# Patient Record
Sex: FEMALE | Race: WHITE | NOT HISPANIC OR LATINO
[De-identification: names, ages, dates, MRNs, and addresses within clinical notes are randomized per-mention and may not be internally consistent; named-entity substitution may affect disease eponyms.]

---

## 2017-01-12 ENCOUNTER — OTHER (OUTPATIENT)
Age: 82
End: 2017-01-12

## 2017-01-13 ENCOUNTER — APPOINTMENT (OUTPATIENT)
Dept: GERIATRICS | Facility: CLINIC | Age: 82
End: 2017-01-13

## 2017-01-13 VITALS
WEIGHT: 137 LBS | SYSTOLIC BLOOD PRESSURE: 138 MMHG | RESPIRATION RATE: 16 BRPM | DIASTOLIC BLOOD PRESSURE: 60 MMHG | TEMPERATURE: 97.6 F | BODY MASS INDEX: 25.21 KG/M2 | HEIGHT: 62 IN | HEART RATE: 58 BPM | OXYGEN SATURATION: 97 %

## 2017-02-13 ENCOUNTER — RX RENEWAL (OUTPATIENT)
Age: 82
End: 2017-02-13

## 2017-03-21 ENCOUNTER — APPOINTMENT (OUTPATIENT)
Dept: CARDIOLOGY | Facility: CLINIC | Age: 82
End: 2017-03-21

## 2017-03-21 ENCOUNTER — NON-APPOINTMENT (OUTPATIENT)
Age: 82
End: 2017-03-21

## 2017-03-21 VITALS — DIASTOLIC BLOOD PRESSURE: 86 MMHG | SYSTOLIC BLOOD PRESSURE: 152 MMHG

## 2017-03-21 VITALS
HEIGHT: 62 IN | WEIGHT: 140 LBS | BODY MASS INDEX: 25.76 KG/M2 | SYSTOLIC BLOOD PRESSURE: 176 MMHG | HEART RATE: 70 BPM | DIASTOLIC BLOOD PRESSURE: 92 MMHG

## 2017-04-21 ENCOUNTER — APPOINTMENT (OUTPATIENT)
Dept: GERIATRICS | Facility: CLINIC | Age: 82
End: 2017-04-21

## 2017-04-21 VITALS
WEIGHT: 140.04 LBS | SYSTOLIC BLOOD PRESSURE: 130 MMHG | BODY MASS INDEX: 25.77 KG/M2 | RESPIRATION RATE: 16 BRPM | DIASTOLIC BLOOD PRESSURE: 60 MMHG | HEART RATE: 63 BPM | HEIGHT: 62 IN | TEMPERATURE: 97.6 F | OXYGEN SATURATION: 97 %

## 2017-04-21 DIAGNOSIS — H57.8 OTHER SPECIFIED DISORDERS OF EYE AND ADNEXA: ICD-10-CM

## 2017-05-04 ENCOUNTER — RX RENEWAL (OUTPATIENT)
Age: 82
End: 2017-05-04

## 2017-06-05 ENCOUNTER — RX RENEWAL (OUTPATIENT)
Age: 82
End: 2017-06-05

## 2017-07-27 ENCOUNTER — RX RENEWAL (OUTPATIENT)
Age: 82
End: 2017-07-27

## 2017-08-21 ENCOUNTER — CLINICAL ADVICE (OUTPATIENT)
Age: 82
End: 2017-08-21

## 2017-08-24 ENCOUNTER — APPOINTMENT (OUTPATIENT)
Dept: GERIATRICS | Facility: CLINIC | Age: 82
End: 2017-08-24

## 2017-09-05 ENCOUNTER — APPOINTMENT (OUTPATIENT)
Dept: OPHTHALMOLOGY | Facility: CLINIC | Age: 82
End: 2017-09-05
Payer: MEDICARE

## 2017-09-05 DIAGNOSIS — H16.8 OTHER KERATITIS: ICD-10-CM

## 2017-09-05 DIAGNOSIS — H04.123 DRY EYE SYNDROME OF BILATERAL LACRIMAL GLANDS: ICD-10-CM

## 2017-09-05 PROCEDURE — 92004 COMPRE OPH EXAM NEW PT 1/>: CPT

## 2017-09-19 ENCOUNTER — APPOINTMENT (OUTPATIENT)
Dept: CARDIOLOGY | Facility: CLINIC | Age: 82
End: 2017-09-19

## 2017-10-04 ENCOUNTER — APPOINTMENT (OUTPATIENT)
Dept: CARDIOLOGY | Facility: CLINIC | Age: 82
End: 2017-10-04
Payer: MEDICARE

## 2017-10-04 ENCOUNTER — NON-APPOINTMENT (OUTPATIENT)
Age: 82
End: 2017-10-04

## 2017-10-04 VITALS
HEIGHT: 62 IN | HEART RATE: 59 BPM | OXYGEN SATURATION: 97 % | SYSTOLIC BLOOD PRESSURE: 183 MMHG | DIASTOLIC BLOOD PRESSURE: 106 MMHG

## 2017-10-04 VITALS — DIASTOLIC BLOOD PRESSURE: 90 MMHG | SYSTOLIC BLOOD PRESSURE: 160 MMHG

## 2017-10-04 PROCEDURE — 93000 ELECTROCARDIOGRAM COMPLETE: CPT

## 2017-10-04 PROCEDURE — 99214 OFFICE O/P EST MOD 30 MIN: CPT

## 2017-10-17 ENCOUNTER — APPOINTMENT (OUTPATIENT)
Dept: GERIATRICS | Facility: CLINIC | Age: 82
End: 2017-10-17
Payer: MEDICARE

## 2017-10-17 VITALS
BODY MASS INDEX: 25.03 KG/M2 | DIASTOLIC BLOOD PRESSURE: 92 MMHG | WEIGHT: 136 LBS | HEIGHT: 62 IN | RESPIRATION RATE: 16 BRPM | SYSTOLIC BLOOD PRESSURE: 170 MMHG | OXYGEN SATURATION: 96 % | HEART RATE: 59 BPM | TEMPERATURE: 97.8 F

## 2017-10-17 VITALS — DIASTOLIC BLOOD PRESSURE: 80 MMHG | SYSTOLIC BLOOD PRESSURE: 160 MMHG

## 2017-10-17 PROCEDURE — 99214 OFFICE O/P EST MOD 30 MIN: CPT

## 2017-10-18 LAB
ALBUMIN SERPL ELPH-MCNC: 4.2 G/DL
ALP BLD-CCNC: 58 U/L
ALT SERPL-CCNC: 18 U/L
ANION GAP SERPL CALC-SCNC: 9 MMOL/L
AST SERPL-CCNC: 28 U/L
BASOPHILS # BLD AUTO: 0.01 K/UL
BASOPHILS NFR BLD AUTO: 0.1 %
BILIRUB SERPL-MCNC: 0.4 MG/DL
BUN SERPL-MCNC: 25 MG/DL
CALCIUM SERPL-MCNC: 9.6 MG/DL
CHLORIDE SERPL-SCNC: 101 MMOL/L
CO2 SERPL-SCNC: 29 MMOL/L
CREAT SERPL-MCNC: 1.27 MG/DL
EOSINOPHIL # BLD AUTO: 0.04 K/UL
EOSINOPHIL NFR BLD AUTO: 0.5 %
GLUCOSE SERPL-MCNC: 86 MG/DL
HCT VFR BLD CALC: 38.4 %
HGB BLD-MCNC: 12.7 G/DL
IMM GRANULOCYTES NFR BLD AUTO: 0.2 %
LYMPHOCYTES # BLD AUTO: 1.36 K/UL
LYMPHOCYTES NFR BLD AUTO: 16.4 %
MAN DIFF?: NORMAL
MCHC RBC-ENTMCNC: 31.1 PG
MCHC RBC-ENTMCNC: 33.1 GM/DL
MCV RBC AUTO: 94.1 FL
MONOCYTES # BLD AUTO: 1 K/UL
MONOCYTES NFR BLD AUTO: 12.1 %
NEUTROPHILS # BLD AUTO: 5.84 K/UL
NEUTROPHILS NFR BLD AUTO: 70.7 %
PLATELET # BLD AUTO: 217 K/UL
POTASSIUM SERPL-SCNC: 5.1 MMOL/L
PROT SERPL-MCNC: 7.1 G/DL
RBC # BLD: 4.08 M/UL
RBC # FLD: 13.8 %
SODIUM SERPL-SCNC: 139 MMOL/L
TSH SERPL-ACNC: 1.59 UIU/ML
WBC # FLD AUTO: 8.27 K/UL

## 2017-11-06 ENCOUNTER — RX RENEWAL (OUTPATIENT)
Age: 82
End: 2017-11-06

## 2017-11-17 ENCOUNTER — RX RENEWAL (OUTPATIENT)
Age: 82
End: 2017-11-17

## 2018-03-19 ENCOUNTER — LABORATORY RESULT (OUTPATIENT)
Age: 83
End: 2018-03-19

## 2018-03-20 ENCOUNTER — APPOINTMENT (OUTPATIENT)
Dept: DERMATOLOGY | Facility: CLINIC | Age: 83
End: 2018-03-20
Payer: MEDICARE

## 2018-03-20 VITALS
SYSTOLIC BLOOD PRESSURE: 138 MMHG | HEIGHT: 62 IN | WEIGHT: 135 LBS | BODY MASS INDEX: 24.84 KG/M2 | DIASTOLIC BLOOD PRESSURE: 80 MMHG

## 2018-03-20 DIAGNOSIS — D48.7 NEOPLASM OF UNCERTAIN BEHAVIOR OF OTHER SPECIFIED SITES: ICD-10-CM

## 2018-03-20 PROCEDURE — 99213 OFFICE O/P EST LOW 20 MIN: CPT | Mod: 25

## 2018-03-20 PROCEDURE — 11302 SHAVE SKIN LESION 1.1-2.0 CM: CPT

## 2018-03-20 PROCEDURE — 17262 DSTRJ MAL LES T/A/L 1.1-2.0: CPT

## 2018-03-20 PROCEDURE — 17263 DSTRJ MAL LES T/A/L 2.1-3.0: CPT

## 2018-03-23 PROBLEM — D48.7 NEOPLASM OF UNCERTAIN BEHAVIOR OF HAND: Status: ACTIVE | Noted: 2018-03-23

## 2018-03-29 ENCOUNTER — OTHER (OUTPATIENT)
Age: 83
End: 2018-03-29

## 2018-05-09 ENCOUNTER — RX RENEWAL (OUTPATIENT)
Age: 83
End: 2018-05-09

## 2018-05-22 ENCOUNTER — APPOINTMENT (OUTPATIENT)
Dept: ORTHOPEDIC SURGERY | Facility: CLINIC | Age: 83
End: 2018-05-22
Payer: MEDICARE

## 2018-05-22 ENCOUNTER — APPOINTMENT (OUTPATIENT)
Dept: GERIATRICS | Facility: CLINIC | Age: 83
End: 2018-05-22

## 2018-05-22 VITALS
HEART RATE: 53 BPM | HEIGHT: 62 IN | DIASTOLIC BLOOD PRESSURE: 85 MMHG | WEIGHT: 135 LBS | BODY MASS INDEX: 24.84 KG/M2 | SYSTOLIC BLOOD PRESSURE: 176 MMHG

## 2018-05-22 DIAGNOSIS — M54.5 LOW BACK PAIN: ICD-10-CM

## 2018-05-22 PROCEDURE — 99215 OFFICE O/P EST HI 40 MIN: CPT

## 2018-06-07 ENCOUNTER — APPOINTMENT (OUTPATIENT)
Dept: GERIATRICS | Facility: CLINIC | Age: 83
End: 2018-06-07
Payer: MEDICARE

## 2018-06-07 VITALS
WEIGHT: 134.38 LBS | TEMPERATURE: 97.6 F | SYSTOLIC BLOOD PRESSURE: 140 MMHG | OXYGEN SATURATION: 97 % | RESPIRATION RATE: 16 BRPM | DIASTOLIC BLOOD PRESSURE: 80 MMHG | BODY MASS INDEX: 24.73 KG/M2 | HEART RATE: 57 BPM | HEIGHT: 62 IN

## 2018-06-07 PROCEDURE — 99215 OFFICE O/P EST HI 40 MIN: CPT

## 2018-06-07 RX ORDER — ZOSTER VACCINE LIVE 19400 [PFU]/.65ML
19400 INJECTION, POWDER, LYOPHILIZED, FOR SUSPENSION SUBCUTANEOUS
Qty: 1 | Refills: 0 | Status: DISCONTINUED | OUTPATIENT
Start: 2017-01-13 | End: 2018-06-07

## 2018-06-08 LAB
25(OH)D3 SERPL-MCNC: 28.7 NG/ML
ALBUMIN SERPL ELPH-MCNC: 4.2 G/DL
ALP BLD-CCNC: 112 U/L
ALT SERPL-CCNC: 17 U/L
ANION GAP SERPL CALC-SCNC: 16 MMOL/L
AST SERPL-CCNC: 29 U/L
BASOPHILS # BLD AUTO: 0.01 K/UL
BASOPHILS NFR BLD AUTO: 0.2 %
BILIRUB SERPL-MCNC: 0.4 MG/DL
BUN SERPL-MCNC: 19 MG/DL
CALCIUM SERPL-MCNC: 9.7 MG/DL
CHLORIDE SERPL-SCNC: 104 MMOL/L
CHOLEST SERPL-MCNC: 172 MG/DL
CHOLEST/HDLC SERPL: 2.3 RATIO
CO2 SERPL-SCNC: 25 MMOL/L
CREAT SERPL-MCNC: 1.14 MG/DL
EOSINOPHIL # BLD AUTO: 0.05 K/UL
EOSINOPHIL NFR BLD AUTO: 0.8 %
FOLATE SERPL-MCNC: >20 NG/ML
GLUCOSE SERPL-MCNC: 77 MG/DL
HCT VFR BLD CALC: 40.1 %
HDLC SERPL-MCNC: 76 MG/DL
HGB BLD-MCNC: 12.8 G/DL
IMM GRANULOCYTES NFR BLD AUTO: 0.5 %
LDLC SERPL CALC-MCNC: 80 MG/DL
LYMPHOCYTES # BLD AUTO: 1.8 K/UL
LYMPHOCYTES NFR BLD AUTO: 27.4 %
MAN DIFF?: NORMAL
MCHC RBC-ENTMCNC: 30.8 PG
MCHC RBC-ENTMCNC: 31.9 GM/DL
MCV RBC AUTO: 96.4 FL
MONOCYTES # BLD AUTO: 0.65 K/UL
MONOCYTES NFR BLD AUTO: 9.9 %
NEUTROPHILS # BLD AUTO: 4.04 K/UL
NEUTROPHILS NFR BLD AUTO: 61.2 %
PLATELET # BLD AUTO: 291 K/UL
POTASSIUM SERPL-SCNC: 4.5 MMOL/L
PROT SERPL-MCNC: 7.8 G/DL
RBC # BLD: 4.16 M/UL
RBC # FLD: 14.9 %
SODIUM SERPL-SCNC: 145 MMOL/L
TRIGL SERPL-MCNC: 78 MG/DL
TSH SERPL-ACNC: 3.36 UIU/ML
VIT B12 SERPL-MCNC: 862 PG/ML
WBC # FLD AUTO: 6.58 K/UL

## 2018-07-19 ENCOUNTER — RX RENEWAL (OUTPATIENT)
Age: 83
End: 2018-07-19

## 2018-08-21 ENCOUNTER — APPOINTMENT (OUTPATIENT)
Dept: GERIATRICS | Facility: CLINIC | Age: 83
End: 2018-08-21

## 2018-08-21 ENCOUNTER — APPOINTMENT (OUTPATIENT)
Dept: GERIATRICS | Facility: CLINIC | Age: 83
End: 2018-08-21
Payer: MEDICARE

## 2018-08-21 VITALS
HEART RATE: 58 BPM | WEIGHT: 135.2 LBS | BODY MASS INDEX: 24.73 KG/M2 | DIASTOLIC BLOOD PRESSURE: 72 MMHG | OXYGEN SATURATION: 97 % | SYSTOLIC BLOOD PRESSURE: 132 MMHG | TEMPERATURE: 97.7 F

## 2018-08-21 PROCEDURE — 99214 OFFICE O/P EST MOD 30 MIN: CPT

## 2018-08-21 PROCEDURE — 99497 ADVNCD CARE PLAN 30 MIN: CPT

## 2018-08-21 RX ORDER — MULTIVIT-MIN/FOLIC/VIT K/LYCOP 400-300MCG
25 MCG TABLET ORAL
Qty: 30 | Refills: 3 | Status: ACTIVE | COMMUNITY
Start: 2018-08-21

## 2018-09-27 ENCOUNTER — RX RENEWAL (OUTPATIENT)
Age: 83
End: 2018-09-27

## 2018-10-19 ENCOUNTER — LABORATORY RESULT (OUTPATIENT)
Age: 83
End: 2018-10-19

## 2018-10-19 ENCOUNTER — APPOINTMENT (OUTPATIENT)
Dept: DERMATOLOGY | Facility: CLINIC | Age: 83
End: 2018-10-19
Payer: MEDICARE

## 2018-10-19 PROCEDURE — 11100 BX SKIN SUBCUTANEOUS&/MUCOUS MEMBRANE 1 LESION: CPT | Mod: 59

## 2018-10-19 PROCEDURE — 17004 DESTROY PREMAL LESIONS 15/>: CPT

## 2018-11-28 ENCOUNTER — RX RENEWAL (OUTPATIENT)
Age: 83
End: 2018-11-28

## 2018-11-29 ENCOUNTER — RX RENEWAL (OUTPATIENT)
Age: 83
End: 2018-11-29

## 2018-12-11 ENCOUNTER — APPOINTMENT (OUTPATIENT)
Dept: GERIATRICS | Facility: CLINIC | Age: 83
End: 2018-12-11
Payer: MEDICARE

## 2018-12-11 VITALS
SYSTOLIC BLOOD PRESSURE: 140 MMHG | HEART RATE: 56 BPM | WEIGHT: 138 LBS | RESPIRATION RATE: 16 BRPM | DIASTOLIC BLOOD PRESSURE: 80 MMHG | BODY MASS INDEX: 25.4 KG/M2 | OXYGEN SATURATION: 97 % | HEIGHT: 62 IN | TEMPERATURE: 97.8 F

## 2018-12-11 DIAGNOSIS — Z85.850 PERSONAL HISTORY OF MALIGNANT NEOPLASM OF THYROID: ICD-10-CM

## 2018-12-11 DIAGNOSIS — Z23 ENCOUNTER FOR IMMUNIZATION: ICD-10-CM

## 2018-12-11 DIAGNOSIS — Z60.2 PROBLEMS RELATED TO LIVING ALONE: ICD-10-CM

## 2018-12-11 DIAGNOSIS — M54.9 DORSALGIA, UNSPECIFIED: ICD-10-CM

## 2018-12-11 DIAGNOSIS — G47.00 INSOMNIA, UNSPECIFIED: ICD-10-CM

## 2018-12-11 DIAGNOSIS — Z74.1 NEED FOR ASSISTANCE WITH PERSONAL CARE: ICD-10-CM

## 2018-12-11 PROCEDURE — G0439: CPT

## 2018-12-11 PROCEDURE — 99214 OFFICE O/P EST MOD 30 MIN: CPT | Mod: 25

## 2018-12-11 SDOH — SOCIAL STABILITY - SOCIAL INSECURITY: PROBLEMS RELATED TO LIVING ALONE: Z60.2

## 2018-12-12 LAB
ALBUMIN SERPL ELPH-MCNC: 4.2 G/DL
ALP BLD-CCNC: 61 U/L
ALT SERPL-CCNC: 15 U/L
ANION GAP SERPL CALC-SCNC: 11 MMOL/L
AST SERPL-CCNC: 27 U/L
BILIRUB SERPL-MCNC: 0.4 MG/DL
BUN SERPL-MCNC: 19 MG/DL
CALCIUM SERPL-MCNC: 9.7 MG/DL
CHLORIDE SERPL-SCNC: 104 MMOL/L
CO2 SERPL-SCNC: 29 MMOL/L
CREAT SERPL-MCNC: 1.11 MG/DL
GLUCOSE SERPL-MCNC: 69 MG/DL
POTASSIUM SERPL-SCNC: 4.3 MMOL/L
PROT SERPL-MCNC: 7.6 G/DL
SODIUM SERPL-SCNC: 144 MMOL/L
TSH SERPL-ACNC: 0.38 UIU/ML

## 2018-12-14 ENCOUNTER — RX RENEWAL (OUTPATIENT)
Age: 83
End: 2018-12-14

## 2018-12-21 ENCOUNTER — APPOINTMENT (OUTPATIENT)
Dept: OPHTHALMOLOGY | Facility: CLINIC | Age: 83
End: 2018-12-21

## 2018-12-27 ENCOUNTER — APPOINTMENT (OUTPATIENT)
Dept: DERMATOLOGY | Facility: CLINIC | Age: 83
End: 2018-12-27
Payer: MEDICARE

## 2018-12-27 VITALS — SYSTOLIC BLOOD PRESSURE: 130 MMHG | DIASTOLIC BLOOD PRESSURE: 80 MMHG

## 2018-12-27 DIAGNOSIS — C44.91 BASAL CELL CARCINOMA OF SKIN, UNSPECIFIED: ICD-10-CM

## 2018-12-27 PROCEDURE — 17003 DESTRUCT PREMALG LES 2-14: CPT

## 2018-12-27 PROCEDURE — 17000 DESTRUCT PREMALG LESION: CPT

## 2018-12-27 PROCEDURE — 99212 OFFICE O/P EST SF 10 MIN: CPT | Mod: 25

## 2018-12-28 ENCOUNTER — APPOINTMENT (OUTPATIENT)
Dept: OPHTHALMOLOGY | Facility: CLINIC | Age: 83
End: 2018-12-28
Payer: MEDICARE

## 2018-12-28 PROCEDURE — 92014 COMPRE OPH EXAM EST PT 1/>: CPT

## 2018-12-28 PROCEDURE — 92134 CPTRZ OPH DX IMG PST SGM RTA: CPT

## 2018-12-31 ENCOUNTER — APPOINTMENT (OUTPATIENT)
Dept: GERIATRICS | Facility: CLINIC | Age: 83
End: 2018-12-31
Payer: MEDICARE

## 2018-12-31 VITALS
HEIGHT: 62 IN | SYSTOLIC BLOOD PRESSURE: 140 MMHG | DIASTOLIC BLOOD PRESSURE: 80 MMHG | WEIGHT: 134 LBS | OXYGEN SATURATION: 98 % | RESPIRATION RATE: 15 BRPM | HEART RATE: 56 BPM | TEMPERATURE: 97.7 F | BODY MASS INDEX: 24.66 KG/M2

## 2018-12-31 DIAGNOSIS — R42 DIZZINESS AND GIDDINESS: ICD-10-CM

## 2018-12-31 DIAGNOSIS — H53.9 UNSPECIFIED VISUAL DISTURBANCE: ICD-10-CM

## 2018-12-31 PROCEDURE — 99214 OFFICE O/P EST MOD 30 MIN: CPT

## 2019-01-02 LAB
ANION GAP SERPL CALC-SCNC: 11 MMOL/L
BASOPHILS # BLD AUTO: 0.01 K/UL
BASOPHILS NFR BLD AUTO: 0.2 %
BUN SERPL-MCNC: 16 MG/DL
CALCIUM SERPL-MCNC: 9.4 MG/DL
CHLORIDE SERPL-SCNC: 102 MMOL/L
CO2 SERPL-SCNC: 28 MMOL/L
CREAT SERPL-MCNC: 1.04 MG/DL
EOSINOPHIL # BLD AUTO: 0.03 K/UL
EOSINOPHIL NFR BLD AUTO: 0.6 %
GLUCOSE SERPL-MCNC: 73 MG/DL
HCT VFR BLD CALC: 37.9 %
HGB BLD-MCNC: 12 G/DL
IMM GRANULOCYTES NFR BLD AUTO: 0.2 %
LYMPHOCYTES # BLD AUTO: 1.52 K/UL
LYMPHOCYTES NFR BLD AUTO: 30 %
MAN DIFF?: NORMAL
MCHC RBC-ENTMCNC: 29.6 PG
MCHC RBC-ENTMCNC: 31.7 GM/DL
MCV RBC AUTO: 93.3 FL
MONOCYTES # BLD AUTO: 0.68 K/UL
MONOCYTES NFR BLD AUTO: 13.4 %
NEUTROPHILS # BLD AUTO: 2.82 K/UL
NEUTROPHILS NFR BLD AUTO: 55.6 %
PLATELET # BLD AUTO: 218 K/UL
POTASSIUM SERPL-SCNC: 3.7 MMOL/L
RBC # BLD: 4.06 M/UL
RBC # FLD: 13.5 %
SODIUM SERPL-SCNC: 141 MMOL/L
WBC # FLD AUTO: 5.07 K/UL

## 2019-01-08 ENCOUNTER — APPOINTMENT (OUTPATIENT)
Dept: OPHTHALMOLOGY | Facility: CLINIC | Age: 84
End: 2019-01-08
Payer: MEDICARE

## 2019-01-08 DIAGNOSIS — H18.459 NODULAR CORNEAL DEGENERATION, UNSPECIFIED EYE: ICD-10-CM

## 2019-01-08 PROCEDURE — 92136 OPHTHALMIC BIOMETRY: CPT

## 2019-01-08 PROCEDURE — 92025 CPTRIZED CORNEAL TOPOGRAPHY: CPT

## 2019-01-08 PROCEDURE — 92014 COMPRE OPH EXAM EST PT 1/>: CPT

## 2019-02-01 ENCOUNTER — MEDICATION RENEWAL (OUTPATIENT)
Age: 84
End: 2019-02-01

## 2019-02-04 ENCOUNTER — RX RENEWAL (OUTPATIENT)
Age: 84
End: 2019-02-04

## 2019-02-11 ENCOUNTER — MEDICATION RENEWAL (OUTPATIENT)
Age: 84
End: 2019-02-11

## 2019-02-12 ENCOUNTER — MEDICATION RENEWAL (OUTPATIENT)
Age: 84
End: 2019-02-12

## 2019-02-12 ENCOUNTER — APPOINTMENT (OUTPATIENT)
Dept: GERIATRICS | Facility: CLINIC | Age: 84
End: 2019-02-12
Payer: MEDICARE

## 2019-02-12 ENCOUNTER — NON-APPOINTMENT (OUTPATIENT)
Age: 84
End: 2019-02-12

## 2019-02-12 VITALS
SYSTOLIC BLOOD PRESSURE: 120 MMHG | HEART RATE: 62 BPM | DIASTOLIC BLOOD PRESSURE: 70 MMHG | RESPIRATION RATE: 15 BRPM | HEIGHT: 62 IN | OXYGEN SATURATION: 96 % | BODY MASS INDEX: 24.66 KG/M2 | WEIGHT: 134 LBS | TEMPERATURE: 97.7 F

## 2019-02-12 DIAGNOSIS — H26.9 UNSPECIFIED CATARACT: ICD-10-CM

## 2019-02-12 PROCEDURE — 93000 ELECTROCARDIOGRAM COMPLETE: CPT

## 2019-02-12 PROCEDURE — 99214 OFFICE O/P EST MOD 30 MIN: CPT | Mod: GC,25

## 2019-02-12 RX ORDER — MELOXICAM 7.5 MG/1
7.5 TABLET ORAL DAILY
Qty: 7 | Refills: 0 | Status: DISCONTINUED | COMMUNITY
Start: 2018-06-07 | End: 2019-02-12

## 2019-02-13 LAB
ANION GAP SERPL CALC-SCNC: 11 MMOL/L
BASOPHILS # BLD AUTO: 0.01 K/UL
BASOPHILS NFR BLD AUTO: 0.2 %
BUN SERPL-MCNC: 20 MG/DL
CALCIUM SERPL-MCNC: 9.7 MG/DL
CHLORIDE SERPL-SCNC: 103 MMOL/L
CO2 SERPL-SCNC: 29 MMOL/L
CREAT SERPL-MCNC: 1.07 MG/DL
EOSINOPHIL # BLD AUTO: 0.05 K/UL
EOSINOPHIL NFR BLD AUTO: 0.9 %
GLUCOSE SERPL-MCNC: 74 MG/DL
HCT VFR BLD CALC: 41.5 %
HGB BLD-MCNC: 13.2 G/DL
IMM GRANULOCYTES NFR BLD AUTO: 0.5 %
LYMPHOCYTES # BLD AUTO: 1.71 K/UL
LYMPHOCYTES NFR BLD AUTO: 30.8 %
MAN DIFF?: NORMAL
MCHC RBC-ENTMCNC: 30.3 PG
MCHC RBC-ENTMCNC: 31.8 GM/DL
MCV RBC AUTO: 95.4 FL
MONOCYTES # BLD AUTO: 0.46 K/UL
MONOCYTES NFR BLD AUTO: 8.3 %
NEUTROPHILS # BLD AUTO: 3.29 K/UL
NEUTROPHILS NFR BLD AUTO: 59.3 %
PLATELET # BLD AUTO: 254 K/UL
POTASSIUM SERPL-SCNC: 4.5 MMOL/L
RBC # BLD: 4.35 M/UL
RBC # FLD: 13.3 %
SODIUM SERPL-SCNC: 143 MMOL/L
TSH SERPL-ACNC: 1.25 UIU/ML
WBC # FLD AUTO: 5.55 K/UL

## 2019-02-13 NOTE — PHYSICAL EXAM
[General Appearance - Alert] : alert [General Appearance - In No Acute Distress] : in no acute distress [Sclera] : the sclera and conjunctiva were normal [PERRL With Normal Accommodation] : pupils were equal in size, round, and reactive to light [Extraocular Movements] : extraocular movements were intact [Outer Ear] : the ears and nose were normal in appearance [Oropharynx] : the oropharynx was normal [Neck Appearance] : the appearance of the neck was normal [Auscultation Breath Sounds / Voice Sounds] : lungs were clear to auscultation bilaterally [Heart Sounds] : normal S1 and S2 [Full Pulse] : the pedal pulses are present [Edema] : there was no peripheral edema [Bowel Sounds] : normal bowel sounds [Abdomen Soft] : soft [Abdomen Tenderness] : non-tender [No CVA Tenderness] : no ~M costovertebral angle tenderness [No Spinal Tenderness] : no spinal tenderness [Skin Color & Pigmentation] : normal skin color and pigmentation [Skin Turgor] : normal skin turgor [] : no rash [Sensation] : the sensory exam was normal to light touch and pinprick [No Focal Deficits] : no focal deficits [Heart Rate And Rhythm] : heart rate was normal and rhythm regular [Involuntary Movements] : no involuntary movements were seen [Affect] : the affect was normal

## 2019-02-13 NOTE — HISTORY OF PRESENT ILLNESS
[Preoperative Visit] : for a medical evaluation prior to surgery [Scheduled Procedure ___] : a [unfilled] [Date of Surgery ___] : on [unfilled] [Surgeon Name ___] : surgeon: [unfilled] [Good] : Good [Stable] : Stable [Cardiovascular Disease] : cardiovascular disease [Electrocardiogram] : ~T an ECG ~C was performed [Fair] : Fair [Fever] : no fever [Chills] : no chills [Fatigue] : no fatigue [Chest Pain] : no chest pain [Cough] : no cough [Dyspnea] : no dyspnea [Dysuria] : no dysuria [Urinary Frequency] : no urinary frequency [Nausea] : no nausea [Vomiting] : no vomiting [Diarrhea] : no diarrhea [Abdominal Pain] : no abdominal pain [Easy Bruising] : no easy bruising [Lower Extremity Swelling] : no lower extremity swelling [Poor Exercise Tolerance] : no poor exercise tolerance [Diabetes] : no diabetes [Pulmonary Disease] : no pulmonary disease [Anti-Platelet Agents] : no anti-platelet agents [Nicotine Dependence] : no nicotine dependence [Alcohol Use] : no  alcohol use [Renal Disease] : no renal disease [GI Disease] : no gastrointestinal disease [Sleep Apnea] : no sleep apnea [Frequent use of NSAIDs] : no use of NSAIDs [Transfusion Reaction] : no transfusion reaction [Prior Anesthesia] : No prior anesthesia [Prev Anesthesia Reaction] : no previous anesthesia reaction [Anesthesia Reaction] : no anesthesia reaction [Sudden Death] : no sudden death [Clotting Disorder] : no clotting disorder [Bleeding Disorder] : no bleeding disorder [de-identified] : Cataract Surgery

## 2019-02-13 NOTE — ASSESSMENT
[Procedure Low Risk] : the procedure risk is low [Optimized for Surgery Pending Laboratory Results] : the patient is optimized for surgery pending laboratory results [Continue] : Continue medications as currently directed [FreeTextEntry1] : Patient is a 89 years old female with PMH of Afib on Amiodarone, HTN, Hypothyroidism who presents for preop clearance. \par \par ###Cataract Surgery Clearance; will check labs and EKG.\par \par d/w Dr. Alston

## 2019-02-20 ENCOUNTER — TRANSCRIPTION ENCOUNTER (OUTPATIENT)
Age: 84
End: 2019-02-20

## 2019-02-21 ENCOUNTER — OUTPATIENT (OUTPATIENT)
Dept: OUTPATIENT SERVICES | Facility: HOSPITAL | Age: 84
LOS: 1 days | End: 2019-02-21
Payer: MEDICARE

## 2019-02-21 ENCOUNTER — APPOINTMENT (OUTPATIENT)
Dept: OPHTHALMOLOGY | Facility: HOSPITAL | Age: 84
End: 2019-02-21

## 2019-02-21 VITALS
OXYGEN SATURATION: 98 % | RESPIRATION RATE: 10 BRPM | HEART RATE: 53 BPM | HEIGHT: 61 IN | WEIGHT: 132.72 LBS | TEMPERATURE: 97 F | SYSTOLIC BLOOD PRESSURE: 185 MMHG | DIASTOLIC BLOOD PRESSURE: 85 MMHG

## 2019-02-21 VITALS
OXYGEN SATURATION: 97 % | RESPIRATION RATE: 19 BRPM | SYSTOLIC BLOOD PRESSURE: 160 MMHG | DIASTOLIC BLOOD PRESSURE: 69 MMHG | HEART RATE: 56 BPM

## 2019-02-21 DIAGNOSIS — Z98.890 OTHER SPECIFIED POSTPROCEDURAL STATES: Chronic | ICD-10-CM

## 2019-02-21 DIAGNOSIS — H25.812 COMBINED FORMS OF AGE-RELATED CATARACT, LEFT EYE: ICD-10-CM

## 2019-02-21 PROCEDURE — 66984 XCAPSL CTRC RMVL W/O ECP: CPT | Mod: RT

## 2019-02-21 PROCEDURE — V2632: CPT

## 2019-02-21 NOTE — ASU PATIENT PROFILE, ADULT - PMH
Cardiovascular disease    GERD (gastroesophageal reflux disease)    HLD (hyperlipidemia)    HTN (hypertension)    Hypothyroidism    Recurrent falls    Stroke  x4

## 2019-02-22 ENCOUNTER — APPOINTMENT (OUTPATIENT)
Dept: OPHTHALMOLOGY | Facility: CLINIC | Age: 84
End: 2019-02-22
Payer: MEDICARE

## 2019-02-22 DIAGNOSIS — H25.811 COMBINED FORMS OF AGE-RELATED CATARACT, RIGHT EYE: ICD-10-CM

## 2019-02-22 PROCEDURE — 99024 POSTOP FOLLOW-UP VISIT: CPT

## 2019-02-28 ENCOUNTER — APPOINTMENT (OUTPATIENT)
Dept: OPHTHALMOLOGY | Facility: CLINIC | Age: 84
End: 2019-02-28
Payer: MEDICARE

## 2019-02-28 PROCEDURE — 99024 POSTOP FOLLOW-UP VISIT: CPT

## 2019-02-28 PROCEDURE — 92136 OPHTHALMIC BIOMETRY: CPT | Mod: LT

## 2019-03-01 PROBLEM — I25.10 ATHEROSCLEROTIC HEART DISEASE OF NATIVE CORONARY ARTERY WITHOUT ANGINA PECTORIS: Chronic | Status: ACTIVE | Noted: 2019-02-21

## 2019-03-01 PROBLEM — R29.6 REPEATED FALLS: Chronic | Status: ACTIVE | Noted: 2019-02-21

## 2019-03-01 PROBLEM — K21.9 GASTRO-ESOPHAGEAL REFLUX DISEASE WITHOUT ESOPHAGITIS: Chronic | Status: ACTIVE | Noted: 2019-02-21

## 2019-03-01 PROBLEM — E78.5 HYPERLIPIDEMIA, UNSPECIFIED: Chronic | Status: ACTIVE | Noted: 2019-02-21

## 2019-03-01 PROBLEM — E03.9 HYPOTHYROIDISM, UNSPECIFIED: Chronic | Status: ACTIVE | Noted: 2019-02-21

## 2019-03-01 PROBLEM — I63.9 CEREBRAL INFARCTION, UNSPECIFIED: Chronic | Status: ACTIVE | Noted: 2019-02-21

## 2019-03-01 PROBLEM — I10 ESSENTIAL (PRIMARY) HYPERTENSION: Chronic | Status: ACTIVE | Noted: 2019-02-21

## 2019-03-05 ENCOUNTER — APPOINTMENT (OUTPATIENT)
Dept: GERIATRICS | Facility: CLINIC | Age: 84
End: 2019-03-05

## 2019-03-19 ENCOUNTER — TRANSCRIPTION ENCOUNTER (OUTPATIENT)
Age: 84
End: 2019-03-19

## 2019-03-19 ENCOUNTER — APPOINTMENT (OUTPATIENT)
Dept: GERIATRICS | Facility: CLINIC | Age: 84
End: 2019-03-19
Payer: MEDICARE

## 2019-03-19 VITALS
HEART RATE: 62 BPM | TEMPERATURE: 97.8 F | OXYGEN SATURATION: 96 % | BODY MASS INDEX: 25.24 KG/M2 | DIASTOLIC BLOOD PRESSURE: 80 MMHG | SYSTOLIC BLOOD PRESSURE: 116 MMHG | RESPIRATION RATE: 18 BRPM | WEIGHT: 138 LBS

## 2019-03-19 DIAGNOSIS — Z01.818 ENCOUNTER FOR OTHER PREPROCEDURAL EXAMINATION: ICD-10-CM

## 2019-03-19 PROCEDURE — 99214 OFFICE O/P EST MOD 30 MIN: CPT

## 2019-03-19 RX ORDER — MECLIZINE HYDROCHLORIDE 12.5 MG/1
12.5 TABLET ORAL
Qty: 20 | Refills: 0 | Status: DISCONTINUED | COMMUNITY
Start: 2018-12-31 | End: 2019-03-19

## 2019-03-19 NOTE — REVIEW OF SYSTEMS
[Eyesight Problems] : eyesight problems [Negative] : Heme/Lymph [FreeTextEntry3] : s/p R cataract excision 5 weeks ago without complications per pt  [FreeTextEntry9] : gait instability - chronic, ambulates with walker at all times, no recent falls

## 2019-03-19 NOTE — HISTORY OF PRESENT ILLNESS
[Preoperative Visit] : for a medical evaluation prior to surgery [Scheduled Procedure ___] : a [unfilled] [Date of Surgery ___] : on [unfilled] [Surgeon Name ___] : surgeon: [unfilled] [Good] : Good [Poor Exercise Tolerance] : poor exercise tolerance [GI Disease] : gastrointestinal disease [Steroid Use in Last 6 Months] : steroid use in the last six months [Electrocardiogram] : ~T an ECG ~C was performed [Unable to Assess] : Unable to Assess [Fever] : no fever [Chills] : no chills [Fatigue] : no fatigue [Chest Pain] : no chest pain [Cough] : no cough [Dyspnea] : no dyspnea [Dysuria] : no dysuria [Urinary Frequency] : no urinary frequency [Nausea] : no nausea [Vomiting] : no vomiting [Diarrhea] : no diarrhea [Abdominal Pain] : no abdominal pain [Easy Bruising] : no easy bruising [Lower Extremity Swelling] : no lower extremity swelling [Diabetes] : no diabetes [Cardiovascular Disease] : no cardiovascular disease [Pulmonary Disease] : no pulmonary disease [Nicotine Dependence] : no nicotine dependence [Anti-Platelet Agents] : no anti-platelet agents [Alcohol Use] : no  alcohol use [Renal Disease] : no renal disease [Sleep Apnea] : no sleep apnea [Thromboembolic Problems] : no thromboembolic problems [Frequent use of NSAIDs] : no use of NSAIDs [Transfusion Reaction] : no transfusion reaction [Impaired Immunity] : no impaired immunity [Prev Anesthesia Reaction] : no previous anesthesia reaction [Frequent Aspirin Use] : no frequent aspirin use [Prior Anesthesia] : No prior anesthesia [Sudden Death] : no sudden death [Anesthesia Reaction] : no anesthesia reaction [Bleeding Disorder] : no bleeding disorder [Clotting Disorder] : no clotting disorder [de-identified] : limited physical function d/t gait instability  - ambulates with walker at baseline  [de-identified] : exercise tolerance limited d/t gait instability, ambulates with walker at baseline  [de-identified] : Dr. Gan

## 2019-03-19 NOTE — PHYSICAL EXAM
[General Appearance - Alert] : alert [General Appearance - Well-Appearing] : healthy appearing [General Appearance - In No Acute Distress] : in no acute distress [Sclera] : the sclera and conjunctiva were normal [Extraocular Movements] : extraocular movements were intact [Neck Appearance] : the appearance of the neck was normal [Outer Ear] : the ears and nose were normal in appearance [Jugular Venous Distention Increased] : there was no jugular-venous distention [] : no respiratory distress [Exaggerated Use Of Accessory Muscles For Inspiration] : no accessory muscle use [Respiration, Rhythm And Depth] : normal respiratory rhythm and effort [Heart Sounds] : normal S1 and S2 [Auscultation Breath Sounds / Voice Sounds] : lungs were clear to auscultation bilaterally [Murmurs] : no murmurs [Full Pulse] : the pedal pulses are present [Bowel Sounds] : normal bowel sounds [Edema] : there was no peripheral edema [Abdomen Tenderness] : non-tender [Abdomen Soft] : soft [Cervical Lymph Nodes Enlarged Anterior Bilaterally] : anterior cervical [Cervical Lymph Nodes Enlarged Posterior Bilaterally] : posterior cervical [No CVA Tenderness] : no ~M costovertebral angle tenderness [No Spinal Tenderness] : no spinal tenderness [Nail Clubbing] : no clubbing  or cyanosis of the fingernails [Motor Tone] : muscle strength and tone were normal [No Focal Deficits] : no focal deficits [Affect] : the affect was normal [Mood] : the mood was normal [FreeTextEntry1] : calm, pleasant

## 2019-03-19 NOTE — ASSESSMENT
[Procedure Low Risk] : the procedure risk is low [Optimized for Surgery] : the patient is optimized for surgery [As per surgery] : as per surgery [Continue] : Continue medications as currently directed [FreeTextEntry1] : 89 y/o Woman w/ PMHx of Afib (on Amiodarone, not on AC or AntiPlt), HTN, HLD, Cataracts b/l s/p recent R cataract surgery without complications, Gait instability w/ use of walker at baseline, who presents for pre-operative evaluation prior to planned L cataract excision scheduled for 4/4/19. \par \par Patient is medically optimized for planned cataract surgery. No further workup needed. Will send recent EKG done in Feb '19 along with assessment to Opthalmology as requested. \par \par Advised to call us if any further questions/concerns.  [FreeTextEntry3] : current medications

## 2019-03-28 ENCOUNTER — APPOINTMENT (OUTPATIENT)
Dept: OPHTHALMOLOGY | Facility: CLINIC | Age: 84
End: 2019-03-28
Payer: MEDICARE

## 2019-03-28 ENCOUNTER — APPOINTMENT (OUTPATIENT)
Dept: OPHTHALMOLOGY | Facility: CLINIC | Age: 84
End: 2019-03-28

## 2019-03-28 ENCOUNTER — MEDICATION RENEWAL (OUTPATIENT)
Age: 84
End: 2019-03-28

## 2019-03-28 PROCEDURE — 99024 POSTOP FOLLOW-UP VISIT: CPT

## 2019-04-04 ENCOUNTER — APPOINTMENT (OUTPATIENT)
Dept: OPHTHALMOLOGY | Facility: HOSPITAL | Age: 84
End: 2019-04-04

## 2019-04-05 ENCOUNTER — APPOINTMENT (OUTPATIENT)
Dept: OPHTHALMOLOGY | Facility: CLINIC | Age: 84
End: 2019-04-05

## 2019-04-11 ENCOUNTER — APPOINTMENT (OUTPATIENT)
Dept: GERIATRICS | Facility: CLINIC | Age: 84
End: 2019-04-11

## 2019-04-11 ENCOUNTER — APPOINTMENT (OUTPATIENT)
Dept: OPHTHALMOLOGY | Facility: CLINIC | Age: 84
End: 2019-04-11

## 2019-04-23 ENCOUNTER — RX RENEWAL (OUTPATIENT)
Age: 84
End: 2019-04-23

## 2019-05-03 ENCOUNTER — MEDICATION RENEWAL (OUTPATIENT)
Age: 84
End: 2019-05-03

## 2019-05-03 ENCOUNTER — OTHER (OUTPATIENT)
Age: 84
End: 2019-05-03

## 2019-05-03 RX ORDER — CYCLOSPORINE 0.5 MG/ML
0.05 EMULSION OPHTHALMIC TWICE DAILY
Qty: 1 | Refills: 5 | Status: DISCONTINUED | COMMUNITY
Start: 2019-05-03 | End: 2019-05-03

## 2019-05-08 ENCOUNTER — MEDICATION RENEWAL (OUTPATIENT)
Age: 84
End: 2019-05-08

## 2019-06-18 ENCOUNTER — APPOINTMENT (OUTPATIENT)
Dept: GERIATRICS | Facility: CLINIC | Age: 84
End: 2019-06-18
Payer: MEDICARE

## 2019-06-18 VITALS
WEIGHT: 139 LBS | TEMPERATURE: 97.4 F | HEART RATE: 60 BPM | BODY MASS INDEX: 25.58 KG/M2 | RESPIRATION RATE: 15 BRPM | OXYGEN SATURATION: 96 % | SYSTOLIC BLOOD PRESSURE: 160 MMHG | HEIGHT: 62 IN | DIASTOLIC BLOOD PRESSURE: 70 MMHG

## 2019-06-18 VITALS — SYSTOLIC BLOOD PRESSURE: 140 MMHG | DIASTOLIC BLOOD PRESSURE: 70 MMHG

## 2019-06-18 DIAGNOSIS — M79.89 OTHER SPECIFIED SOFT TISSUE DISORDERS: ICD-10-CM

## 2019-06-18 DIAGNOSIS — Z23 ENCOUNTER FOR IMMUNIZATION: ICD-10-CM

## 2019-06-18 DIAGNOSIS — H35.30 UNSPECIFIED MACULAR DEGENERATION: ICD-10-CM

## 2019-06-18 PROCEDURE — 99214 OFFICE O/P EST MOD 30 MIN: CPT

## 2019-06-18 RX ORDER — ZOSTER VACCINE RECOMBINANT, ADJUVANTED 50 MCG/0.5
50 KIT INTRAMUSCULAR
Qty: 1 | Refills: 1 | Status: DISCONTINUED | OUTPATIENT
Start: 2018-08-21 | End: 2019-06-18

## 2019-06-20 ENCOUNTER — INPATIENT (INPATIENT)
Facility: HOSPITAL | Age: 84
LOS: 1 days | Discharge: HOME | DRG: 536 | End: 2019-06-22
Attending: HOSPITALIST | Admitting: HOSPITALIST
Payer: MEDICARE

## 2019-06-20 VITALS
TEMPERATURE: 98 F | RESPIRATION RATE: 16 BRPM | HEART RATE: 59 BPM | HEIGHT: 62 IN | WEIGHT: 134.92 LBS | OXYGEN SATURATION: 96 % | SYSTOLIC BLOOD PRESSURE: 175 MMHG | DIASTOLIC BLOOD PRESSURE: 76 MMHG

## 2019-06-20 DIAGNOSIS — Z98.890 OTHER SPECIFIED POSTPROCEDURAL STATES: Chronic | ICD-10-CM

## 2019-06-20 PROCEDURE — 93010 ELECTROCARDIOGRAM REPORT: CPT

## 2019-06-20 RX ORDER — ACETAMINOPHEN 500 MG
1000 TABLET ORAL ONCE
Refills: 0 | Status: COMPLETED | OUTPATIENT
Start: 2019-06-20 | End: 2019-06-21

## 2019-06-20 NOTE — ED ADULT TRIAGE NOTE - CHIEF COMPLAINT QUOTE
BIBA c/o right hip pain after fall, ems states that pt said she was getting up to get her walker and lost her balance

## 2019-06-21 ENCOUNTER — TRANSCRIPTION ENCOUNTER (OUTPATIENT)
Age: 84
End: 2019-06-21
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
## 2019-06-21 DIAGNOSIS — M79.18 MYALGIA, OTHER SITE: ICD-10-CM

## 2019-06-21 DIAGNOSIS — I10 ESSENTIAL (PRIMARY) HYPERTENSION: ICD-10-CM

## 2019-06-21 DIAGNOSIS — E03.9 HYPOTHYROIDISM, UNSPECIFIED: ICD-10-CM

## 2019-06-21 DIAGNOSIS — I49.9 CARDIAC ARRHYTHMIA, UNSPECIFIED: ICD-10-CM

## 2019-06-21 DIAGNOSIS — Z29.9 ENCOUNTER FOR PROPHYLACTIC MEASURES, UNSPECIFIED: ICD-10-CM

## 2019-06-21 LAB
ALBUMIN SERPL ELPH-MCNC: 3.3 G/DL — LOW (ref 3.5–5)
ALBUMIN SERPL ELPH-MCNC: 3.3 G/DL — LOW (ref 3.5–5)
ALP SERPL-CCNC: 62 U/L — SIGNIFICANT CHANGE UP (ref 40–120)
ALP SERPL-CCNC: 64 U/L — SIGNIFICANT CHANGE UP (ref 40–120)
ALT FLD-CCNC: 23 U/L DA — SIGNIFICANT CHANGE UP (ref 10–60)
ALT FLD-CCNC: 26 U/L DA — SIGNIFICANT CHANGE UP (ref 10–60)
ANION GAP SERPL CALC-SCNC: 4 MMOL/L — LOW (ref 5–17)
ANION GAP SERPL CALC-SCNC: 7 MMOL/L — SIGNIFICANT CHANGE UP (ref 5–17)
APPEARANCE UR: ABNORMAL
APTT BLD: 23.8 SEC — LOW (ref 27.5–36.3)
AST SERPL-CCNC: 22 U/L — SIGNIFICANT CHANGE UP (ref 10–40)
AST SERPL-CCNC: 25 U/L — SIGNIFICANT CHANGE UP (ref 10–40)
BASOPHILS # BLD AUTO: 0.02 K/UL — SIGNIFICANT CHANGE UP (ref 0–0.2)
BASOPHILS # BLD AUTO: 0.02 K/UL — SIGNIFICANT CHANGE UP (ref 0–0.2)
BASOPHILS NFR BLD AUTO: 0.2 % — SIGNIFICANT CHANGE UP (ref 0–2)
BASOPHILS NFR BLD AUTO: 0.3 % — SIGNIFICANT CHANGE UP (ref 0–2)
BILIRUB SERPL-MCNC: 0.4 MG/DL — SIGNIFICANT CHANGE UP (ref 0.2–1.2)
BILIRUB SERPL-MCNC: 0.6 MG/DL — SIGNIFICANT CHANGE UP (ref 0.2–1.2)
BILIRUB UR-MCNC: ABNORMAL
BUN SERPL-MCNC: 22 MG/DL — HIGH (ref 7–18)
BUN SERPL-MCNC: 24 MG/DL — HIGH (ref 7–18)
CALCIUM SERPL-MCNC: 8.6 MG/DL — SIGNIFICANT CHANGE UP (ref 8.4–10.5)
CALCIUM SERPL-MCNC: 8.6 MG/DL — SIGNIFICANT CHANGE UP (ref 8.4–10.5)
CHLORIDE SERPL-SCNC: 107 MMOL/L — SIGNIFICANT CHANGE UP (ref 96–108)
CHLORIDE SERPL-SCNC: 108 MMOL/L — SIGNIFICANT CHANGE UP (ref 96–108)
CHOLEST SERPL-MCNC: 158 MG/DL — SIGNIFICANT CHANGE UP (ref 10–199)
CO2 SERPL-SCNC: 26 MMOL/L — SIGNIFICANT CHANGE UP (ref 22–31)
CO2 SERPL-SCNC: 29 MMOL/L — SIGNIFICANT CHANGE UP (ref 22–31)
COLOR SPEC: YELLOW — SIGNIFICANT CHANGE UP
CREAT SERPL-MCNC: 1.16 MG/DL — SIGNIFICANT CHANGE UP (ref 0.5–1.3)
CREAT SERPL-MCNC: 1.29 MG/DL — SIGNIFICANT CHANGE UP (ref 0.5–1.3)
DIFF PNL FLD: ABNORMAL
EOSINOPHIL # BLD AUTO: 0.01 K/UL — SIGNIFICANT CHANGE UP (ref 0–0.5)
EOSINOPHIL # BLD AUTO: 0.04 K/UL — SIGNIFICANT CHANGE UP (ref 0–0.5)
EOSINOPHIL NFR BLD AUTO: 0.1 % — SIGNIFICANT CHANGE UP (ref 0–6)
EOSINOPHIL NFR BLD AUTO: 0.4 % — SIGNIFICANT CHANGE UP (ref 0–6)
FOLATE SERPL-MCNC: 9.6 NG/ML — SIGNIFICANT CHANGE UP
GLUCOSE SERPL-MCNC: 116 MG/DL — HIGH (ref 70–99)
GLUCOSE SERPL-MCNC: 159 MG/DL — HIGH (ref 70–99)
GLUCOSE UR QL: NEGATIVE — SIGNIFICANT CHANGE UP
HCT VFR BLD CALC: 36 % — SIGNIFICANT CHANGE UP (ref 34.5–45)
HCT VFR BLD CALC: 38 % — SIGNIFICANT CHANGE UP (ref 34.5–45)
HDLC SERPL-MCNC: 78 MG/DL — SIGNIFICANT CHANGE UP
HGB BLD-MCNC: 11.6 G/DL — SIGNIFICANT CHANGE UP (ref 11.5–15.5)
HGB BLD-MCNC: 12.3 G/DL — SIGNIFICANT CHANGE UP (ref 11.5–15.5)
IMM GRANULOCYTES NFR BLD AUTO: 0.7 % — SIGNIFICANT CHANGE UP (ref 0–1.5)
IMM GRANULOCYTES NFR BLD AUTO: 1.1 % — SIGNIFICANT CHANGE UP (ref 0–1.5)
INR BLD: 1 RATIO — SIGNIFICANT CHANGE UP (ref 0.88–1.16)
KETONES UR-MCNC: ABNORMAL
LEUKOCYTE ESTERASE UR-ACNC: ABNORMAL
LIPID PNL WITH DIRECT LDL SERPL: 74 MG/DL — SIGNIFICANT CHANGE UP
LYMPHOCYTES # BLD AUTO: 0.94 K/UL — LOW (ref 1–3.3)
LYMPHOCYTES # BLD AUTO: 1.34 K/UL — SIGNIFICANT CHANGE UP (ref 1–3.3)
LYMPHOCYTES # BLD AUTO: 13.3 % — SIGNIFICANT CHANGE UP (ref 13–44)
LYMPHOCYTES # BLD AUTO: 13.6 % — SIGNIFICANT CHANGE UP (ref 13–44)
MAGNESIUM SERPL-MCNC: 2 MG/DL — SIGNIFICANT CHANGE UP (ref 1.6–2.6)
MCHC RBC-ENTMCNC: 30.4 PG — SIGNIFICANT CHANGE UP (ref 27–34)
MCHC RBC-ENTMCNC: 31 PG — SIGNIFICANT CHANGE UP (ref 27–34)
MCHC RBC-ENTMCNC: 32.2 GM/DL — SIGNIFICANT CHANGE UP (ref 32–36)
MCHC RBC-ENTMCNC: 32.4 GM/DL — SIGNIFICANT CHANGE UP (ref 32–36)
MCV RBC AUTO: 94.2 FL — SIGNIFICANT CHANGE UP (ref 80–100)
MCV RBC AUTO: 95.7 FL — SIGNIFICANT CHANGE UP (ref 80–100)
MONOCYTES # BLD AUTO: 0.54 K/UL — SIGNIFICANT CHANGE UP (ref 0–0.9)
MONOCYTES # BLD AUTO: 0.74 K/UL — SIGNIFICANT CHANGE UP (ref 0–0.9)
MONOCYTES NFR BLD AUTO: 7.5 % — SIGNIFICANT CHANGE UP (ref 2–14)
MONOCYTES NFR BLD AUTO: 7.6 % — SIGNIFICANT CHANGE UP (ref 2–14)
NEUTROPHILS # BLD AUTO: 5.53 K/UL — SIGNIFICANT CHANGE UP (ref 1.8–7.4)
NEUTROPHILS # BLD AUTO: 7.63 K/UL — HIGH (ref 1.8–7.4)
NEUTROPHILS NFR BLD AUTO: 77.2 % — HIGH (ref 43–77)
NEUTROPHILS NFR BLD AUTO: 78 % — HIGH (ref 43–77)
NITRITE UR-MCNC: NEGATIVE — SIGNIFICANT CHANGE UP
NRBC # BLD: 0 /100 WBCS — SIGNIFICANT CHANGE UP (ref 0–0)
NRBC # BLD: 0 /100 WBCS — SIGNIFICANT CHANGE UP (ref 0–0)
PH UR: 5 — SIGNIFICANT CHANGE UP (ref 5–8)
PHOSPHATE SERPL-MCNC: 3.4 MG/DL — SIGNIFICANT CHANGE UP (ref 2.5–4.5)
PLATELET # BLD AUTO: 170 K/UL — SIGNIFICANT CHANGE UP (ref 150–400)
PLATELET # BLD AUTO: 184 K/UL — SIGNIFICANT CHANGE UP (ref 150–400)
POTASSIUM SERPL-MCNC: 3.5 MMOL/L — SIGNIFICANT CHANGE UP (ref 3.5–5.3)
POTASSIUM SERPL-MCNC: 4.7 MMOL/L — SIGNIFICANT CHANGE UP (ref 3.5–5.3)
POTASSIUM SERPL-SCNC: 3.5 MMOL/L — SIGNIFICANT CHANGE UP (ref 3.5–5.3)
POTASSIUM SERPL-SCNC: 4.7 MMOL/L — SIGNIFICANT CHANGE UP (ref 3.5–5.3)
PROT SERPL-MCNC: 6.9 G/DL — SIGNIFICANT CHANGE UP (ref 6–8.3)
PROT SERPL-MCNC: 7.1 G/DL — SIGNIFICANT CHANGE UP (ref 6–8.3)
PROT UR-MCNC: 100
PROTHROM AB SERPL-ACNC: 11.1 SEC — SIGNIFICANT CHANGE UP (ref 10–12.9)
RBC # BLD: 3.82 M/UL — SIGNIFICANT CHANGE UP (ref 3.8–5.2)
RBC # BLD: 3.97 M/UL — SIGNIFICANT CHANGE UP (ref 3.8–5.2)
RBC # FLD: 12.5 % — SIGNIFICANT CHANGE UP (ref 10.3–14.5)
RBC # FLD: 12.5 % — SIGNIFICANT CHANGE UP (ref 10.3–14.5)
SODIUM SERPL-SCNC: 140 MMOL/L — SIGNIFICANT CHANGE UP (ref 135–145)
SODIUM SERPL-SCNC: 141 MMOL/L — SIGNIFICANT CHANGE UP (ref 135–145)
SP GR SPEC: 1.02 — SIGNIFICANT CHANGE UP (ref 1.01–1.02)
TOTAL CHOLESTEROL/HDL RATIO MEASUREMENT: 2 RATIO — LOW (ref 3.3–7.1)
TRIGL SERPL-MCNC: 32 MG/DL — SIGNIFICANT CHANGE UP (ref 10–149)
TSH SERPL-MCNC: 0.3 UU/ML — LOW (ref 0.34–4.82)
UROBILINOGEN FLD QL: 1
VIT B12 SERPL-MCNC: 438 PG/ML — SIGNIFICANT CHANGE UP (ref 232–1245)
WBC # BLD: 7.09 K/UL — SIGNIFICANT CHANGE UP (ref 3.8–10.5)
WBC # BLD: 9.88 K/UL — SIGNIFICANT CHANGE UP (ref 3.8–10.5)
WBC # FLD AUTO: 7.09 K/UL — SIGNIFICANT CHANGE UP (ref 3.8–10.5)
WBC # FLD AUTO: 9.88 K/UL — SIGNIFICANT CHANGE UP (ref 3.8–10.5)

## 2019-06-21 PROCEDURE — 99222 1ST HOSP IP/OBS MODERATE 55: CPT

## 2019-06-21 PROCEDURE — 99285 EMERGENCY DEPT VISIT HI MDM: CPT | Mod: 25

## 2019-06-21 PROCEDURE — 70450 CT HEAD/BRAIN W/O DYE: CPT | Mod: 26

## 2019-06-21 PROCEDURE — 73502 X-RAY EXAM HIP UNI 2-3 VIEWS: CPT | Mod: 26,RT

## 2019-06-21 PROCEDURE — 72192 CT PELVIS W/O DYE: CPT | Mod: 26

## 2019-06-21 RX ORDER — ACETAMINOPHEN 500 MG
650 TABLET ORAL EVERY 6 HOURS
Refills: 0 | Status: DISCONTINUED | OUTPATIENT
Start: 2019-06-21 | End: 2019-06-21

## 2019-06-21 RX ORDER — TRAMADOL HYDROCHLORIDE 50 MG/1
25 TABLET ORAL EVERY 8 HOURS
Refills: 0 | Status: DISCONTINUED | OUTPATIENT
Start: 2019-06-21 | End: 2019-06-21

## 2019-06-21 RX ORDER — LANOLIN ALCOHOL/MO/W.PET/CERES
3 CREAM (GRAM) TOPICAL AT BEDTIME
Refills: 0 | Status: DISCONTINUED | OUTPATIENT
Start: 2019-06-21 | End: 2019-06-22

## 2019-06-21 RX ORDER — OXYCODONE HYDROCHLORIDE 5 MG/1
5 TABLET ORAL THREE TIMES A DAY
Refills: 0 | Status: DISCONTINUED | OUTPATIENT
Start: 2019-06-21 | End: 2019-06-21

## 2019-06-21 RX ORDER — KETOROLAC TROMETHAMINE 30 MG/ML
30 SYRINGE (ML) INJECTION ONCE
Refills: 0 | Status: DISCONTINUED | OUTPATIENT
Start: 2019-06-21 | End: 2019-06-21

## 2019-06-21 RX ORDER — LEVOTHYROXINE SODIUM 125 MCG
125 TABLET ORAL
Refills: 0 | Status: DISCONTINUED | OUTPATIENT
Start: 2019-06-21 | End: 2019-06-22

## 2019-06-21 RX ORDER — GABAPENTIN 400 MG/1
100 CAPSULE ORAL AT BEDTIME
Refills: 0 | Status: DISCONTINUED | OUTPATIENT
Start: 2019-06-21 | End: 2019-06-22

## 2019-06-21 RX ORDER — LISINOPRIL 2.5 MG/1
20 TABLET ORAL
Refills: 0 | Status: DISCONTINUED | OUTPATIENT
Start: 2019-06-21 | End: 2019-06-22

## 2019-06-21 RX ORDER — ATORVASTATIN CALCIUM 80 MG/1
20 TABLET, FILM COATED ORAL AT BEDTIME
Refills: 0 | Status: DISCONTINUED | OUTPATIENT
Start: 2019-06-21 | End: 2019-06-22

## 2019-06-21 RX ORDER — KETOROLAC TROMETHAMINE 30 MG/ML
15 SYRINGE (ML) INJECTION EVERY 8 HOURS
Refills: 0 | Status: DISCONTINUED | OUTPATIENT
Start: 2019-06-21 | End: 2019-06-21

## 2019-06-21 RX ORDER — KETOROLAC TROMETHAMINE 30 MG/ML
15 SYRINGE (ML) INJECTION ONCE
Refills: 0 | Status: DISCONTINUED | OUTPATIENT
Start: 2019-06-21 | End: 2019-06-21

## 2019-06-21 RX ORDER — KETOROLAC TROMETHAMINE 30 MG/ML
15 SYRINGE (ML) INJECTION EVERY 8 HOURS
Refills: 0 | Status: DISCONTINUED | OUTPATIENT
Start: 2019-06-21 | End: 2019-06-22

## 2019-06-21 RX ORDER — LIDOCAINE 4 G/100G
1 CREAM TOPICAL DAILY
Refills: 0 | Status: DISCONTINUED | OUTPATIENT
Start: 2019-06-21 | End: 2019-06-22

## 2019-06-21 RX ORDER — PROPRANOLOL HCL 160 MG
40 CAPSULE, EXTENDED RELEASE 24HR ORAL
Refills: 0 | Status: DISCONTINUED | OUTPATIENT
Start: 2019-06-21 | End: 2019-06-22

## 2019-06-21 RX ORDER — ACETAMINOPHEN 500 MG
650 TABLET ORAL EVERY 6 HOURS
Refills: 0 | Status: DISCONTINUED | OUTPATIENT
Start: 2019-06-21 | End: 2019-06-22

## 2019-06-21 RX ORDER — ENOXAPARIN SODIUM 100 MG/ML
40 INJECTION SUBCUTANEOUS DAILY
Refills: 0 | Status: DISCONTINUED | OUTPATIENT
Start: 2019-06-21 | End: 2019-06-22

## 2019-06-21 RX ORDER — AMIODARONE HYDROCHLORIDE 400 MG/1
200 TABLET ORAL
Refills: 0 | Status: DISCONTINUED | OUTPATIENT
Start: 2019-06-21 | End: 2019-06-22

## 2019-06-21 RX ADMIN — ATORVASTATIN CALCIUM 20 MILLIGRAM(S): 80 TABLET, FILM COATED ORAL at 22:31

## 2019-06-21 RX ADMIN — LIDOCAINE 1 PATCH: 4 CREAM TOPICAL at 12:44

## 2019-06-21 RX ADMIN — Medication 650 MILLIGRAM(S): at 17:54

## 2019-06-21 RX ADMIN — Medication 650 MILLIGRAM(S): at 13:15

## 2019-06-21 RX ADMIN — Medication 650 MILLIGRAM(S): at 12:44

## 2019-06-21 RX ADMIN — Medication 40 MILLIGRAM(S): at 22:31

## 2019-06-21 RX ADMIN — LIDOCAINE 1 PATCH: 4 CREAM TOPICAL at 19:30

## 2019-06-21 RX ADMIN — Medication 40 MILLIGRAM(S): at 12:43

## 2019-06-21 RX ADMIN — Medication 15 MILLIGRAM(S): at 10:39

## 2019-06-21 RX ADMIN — Medication 15 MILLIGRAM(S): at 10:55

## 2019-06-21 RX ADMIN — GABAPENTIN 100 MILLIGRAM(S): 400 CAPSULE ORAL at 22:31

## 2019-06-21 RX ADMIN — AMIODARONE HYDROCHLORIDE 200 MILLIGRAM(S): 400 TABLET ORAL at 12:42

## 2019-06-21 RX ADMIN — Medication 650 MILLIGRAM(S): at 19:00

## 2019-06-21 RX ADMIN — LISINOPRIL 20 MILLIGRAM(S): 2.5 TABLET ORAL at 12:43

## 2019-06-21 RX ADMIN — Medication 400 MILLIGRAM(S): at 00:19

## 2019-06-21 NOTE — PROGRESS NOTE ADULT - PROBLEM SELECTOR PLAN 4
c/w amiodarone Takes amlodipine as needed, lisinopril and propanolol for BP  started propanolol and lisinopril  Will hold amlodipine

## 2019-06-21 NOTE — H&P ADULT - PROBLEM SELECTOR PLAN 1
p/w mechanical fall  CT head unremarkable  Xray hip and ankle does not show fracture  Follow up PT, vitamin B12, Folic acid, TSH  PT consult  pain management

## 2019-06-21 NOTE — DISCHARGE NOTE PROVIDER - NSDCCPCAREPLAN_GEN_ALL_CORE_FT
PRINCIPAL DISCHARGE DIAGNOSIS  Diagnosis: Pelvic fracture  Assessment and Plan of Treatment: You presented with Rt. hip pain after mechanical fall. Your CT head was unremarkable and Xray hip and ankle does not show fracture but CT pelvis showed Mildly displaced and mildly comminuted fracture of the right iliac bone just below level of the right SI joint without extension to the acetabulum, Acute right superior and inferior pubic rami fractures with old healed bilateral inferior pubic rami fractures, Degenerative changes. Orthopedics was consulted and advised   Continue taking pain medications and physical therapy. Follow up with primary care physician in one week after discharge.      SECONDARY DISCHARGE DIAGNOSES  Diagnosis: HTN (hypertension)  Assessment and Plan of Treatment: Continue with blood pressure medication. Maintain a healthy diet that consist of low sugar, low fat, low sodium diet.   Follow up with primary care physician in one week after discharge.    Diagnosis: Hypothyroidism  Assessment and Plan of Treatment: You are on levothyroxine 125mcg, your TSH level was mildly low to 0.3 likely due to stress fro fracture. Continue with your synthroid medication. Please follow up with you primary care physician and recheck TSH level.    Diagnosis: Arrhythmia  Assessment and Plan of Treatment: Continue taking amiodarone for arrythmia. Follow up with primary care physician in one week after discharge. PRINCIPAL DISCHARGE DIAGNOSIS  Diagnosis: Pelvic fracture  Assessment and Plan of Treatment: You presented with Rt. hip pain after mechanical fall. Your CT head was unremarkable and Xray hip and ankle does not show fracture but CT pelvis showed Mildly displaced and mildly comminuted fracture of the right iliac bone just below level of the right SI joint without extension to the acetabulum, Acute right superior and inferior pubic rami fractures with old healed bilateral inferior pubic rami fractures, Degenerative changes. Orthopedics was consulted and advised conservative management with pain control and weight bearing as tolerated and to use a walker.   Continue taking pain medications and physical therapy. Follow up with primary care physician and orthopedics Dr. Carmona in one week after discharge.      SECONDARY DISCHARGE DIAGNOSES  Diagnosis: HTN (hypertension)  Assessment and Plan of Treatment: Continue with blood pressure medication. Maintain a healthy diet that consist of low sugar, low fat, low sodium diet.   Follow up with primary care physician in one week after discharge.    Diagnosis: Hypothyroidism  Assessment and Plan of Treatment: You are on levothyroxine 125mcg, your TSH level was mildly low to 0.3 likely due to stress fro fracture. Continue with your synthroid medication. Please follow up with you primary care physician and recheck TSH level.    Diagnosis: Arrhythmia  Assessment and Plan of Treatment: Continue taking amiodarone for arrythmia. Follow up with primary care physician in one week after discharge. PRINCIPAL DISCHARGE DIAGNOSIS  Diagnosis: Pelvic fracture  Assessment and Plan of Treatment: You presented with Rt. hip pain after mechanical fall. Your CT head was unremarkable and Xray hip and ankle does not show fracture but CT pelvis showed Mildly displaced and mildly comminuted fracture of the right iliac bone just below level of the right SI joint without extension to the acetabulum, Acute right superior and inferior pubic rami fractures with old healed bilateral inferior pubic rami fractures, Degenerative changes. Orthopedics was consulted and advised conservative management with pain control and weight bearing as tolerated and to use a walker.   Continue taking pain medications and physical therapy. Follow up with primary care physician and orthopedics Dr. Carmona in one week after discharge.      SECONDARY DISCHARGE DIAGNOSES  Diagnosis: UTI (urinary tract infection)  Assessment and Plan of Treatment: Your urine analysis was positive for infection. You do not have any fever or white count, you complaint of increased urinary frequency, Please take keflex for 5 days as prescribed.    Diagnosis: HTN (hypertension)  Assessment and Plan of Treatment: Continue with blood pressure medication. Maintain a healthy diet that consist of low sugar, low fat, low sodium diet.   Follow up with primary care physician in one week after discharge.    Diagnosis: Hypothyroidism  Assessment and Plan of Treatment: You are on levothyroxine 125mcg, your TSH level was mildly low to 0.3 likely due to stress fro fracture. Continue with your synthroid medication. Please follow up with you primary care physician and recheck TSH level.    Diagnosis: Arrhythmia  Assessment and Plan of Treatment: Continue taking amiodarone for arrythmia. Follow up with primary care physician in one week after discharge.

## 2019-06-21 NOTE — PHYSICAL THERAPY INITIAL EVALUATION ADULT - LIVES WITH, PROFILE
alone/has 24x7 HHA. Lives in an apt with an elevator. Patient has recliner,bedside commode, W.BENNY. and R.W.

## 2019-06-21 NOTE — H&P ADULT - ASSESSMENT
89 yr old F from home ambulates with walker, with PMH of TIA, Htn, Thyroid disease, GERD, multiple falls with vertebral fractures came with complain of Rt buttock pain s/p mechanical fall x 1 day.    Patient is admitted for mechanical fall, Unable to walk

## 2019-06-21 NOTE — H&P ADULT - NSHPPHYSICALEXAM_GEN_ALL_CORE
ICU Vital Signs Last 24 Hrs  T(C): 36.6 (20 Jun 2019 23:24), Max: 36.6 (20 Jun 2019 23:24)  T(F): 97.8 (20 Jun 2019 23:24), Max: 97.8 (20 Jun 2019 23:24)  HR: 59 (20 Jun 2019 23:24) (59 - 59)  BP: 175/76 (20 Jun 2019 23:24) (175/76 - 175/76)  BP(mean): --  ABP: --  ABP(mean): --  RR: 16 (20 Jun 2019 23:24) (16 - 16)  SpO2: 96% (20 Jun 2019 23:24) (96% - 96%)    PHYSICAL EXAM:  GENERAL: NAD,   HEAD:  Atraumatic, Normocephalic  EYES:  conjunctiva and sclera clear  NECK: Supple, No JVD, Normal thyroid  CHEST/LUNG: Clear to auscultation. Clear to percussion bilaterally; No rales, rhonchi, wheezing, or rubs  HEART: Regular rate and rhythm; No murmurs, rubs, or gallops  ABDOMEN: Soft, Nontender, Nondistended; Bowel sounds present  NERVOUS SYSTEM:  Alert & Oriented X3,    EXTREMITIES:  2+ Peripheral Pulses, No clubbing, cyanosis, or edema  MUSCULOSKELETAL: Rt Buttock tenderness  SKIN: warm dry

## 2019-06-21 NOTE — ED PROVIDER NOTE - OBJECTIVE STATEMENT
89 yo F pmh of TIA, HTN, thyroid disease, and GERD presents s/p mechanical slip and fall b/c pt did not use her walker. c/o R buttocks pain. Denies HA, head trauma, LOC, neck pain, n/v, syncope, other acute complaints.

## 2019-06-21 NOTE — PROGRESS NOTE ADULT - PROBLEM SELECTOR PLAN 1
p/w mechanical fall  CT head unremarkable  Xray hip and ankle does not show fracture  Follow up PT, vitamin B12, Folic acid, TSH  PT consult  pain management p/w mechanical fall  CT head unremarkable  Xray hip and ankle does not show fracture  on pain meds  Follow up PT, vitamin B12, Folic acid, TSH  FU CT pelvis  PT consulted p/w mechanical fall  CT head unremarkable  Xray hip and ankle does not show fracture  CT pelvis shows Mildly displaced and mildly comminuted fracture of the right iliac bone just below level of the right SI joint without extension to the acetabulum. Acute right superior and inferior pubic rami fractures with old healed bilateral inferior pubic rami fractures. Degenerative changes  on pain meds  Follow up vitamin B12, Folic acid, UA  Ortho consulted  PT consulted

## 2019-06-21 NOTE — CONSULT NOTE ADULT - SUBJECTIVE AND OBJECTIVE BOX
Pt Name: PHYLLIS BASSEN  MRN: [id number]    ORTHOPEDIC CONSULT:    Orthopedic diagnosis: R pubic Rami Fx    HPI: Patient is a 89y old  Female who presents with a chief complaint of Rt Buttock pain s/p Mechanical fall.  Pain described as an aching pain provoked with motion and bearing weight. States the pain starts at the buttocks and radiates to her groin.  Denies numbness/tingling, loose of sensation, head trauma, CP, SOB.   .       AMBULATION: Baseline Ambulation [x ] independent [ ] With Cane [ ] With Walker [ ]  Bedbound [ ] Pivot transfers to Wheelchair only    PAST MEDICAL & SURGICAL HISTORY:  H/O gastroesophageal reflux (GERD)  H/O: hypothyroidism  H/O cardiac arrhythmia  HTN (hypertension)  Cardiovascular disease  HTN (hypertension)  Recurrent falls  Hypothyroidism  HLD (hyperlipidemia)  GERD (gastroesophageal reflux disease)  Stroke: x4  S/P thyroidectomy  H/O kyphoplasty      ALLERGIES: No Known Allergies      MEDICATIONS: acetaminophen   Tablet .. 650 milliGRAM(s) Oral every 6 hours  amiodarone    Tablet 200 milliGRAM(s) Oral <User Schedule>  atorvastatin 20 milliGRAM(s) Oral at bedtime  enoxaparin Injectable 40 milliGRAM(s) SubCutaneous daily  gabapentin 100 milliGRAM(s) Oral at bedtime  ketorolac   Injectable 15 milliGRAM(s) IV Push every 8 hours PRN  levothyroxine 125 MICROGram(s) Oral <User Schedule>  lidocaine   Patch 1 Patch Transdermal daily  lisinopril 20 milliGRAM(s) Oral <User Schedule>  melatonin 3 milliGRAM(s) Oral at bedtime PRN  propranolol 40 milliGRAM(s) Oral <User Schedule>      PHYSICAL EXAM:    Vital Signs Last 24 Hrs  T(C): 36.7 (21 Jun 2019 15:15), Max: 36.7 (21 Jun 2019 15:15)  T(F): 98.1 (21 Jun 2019 15:15), Max: 98.1 (21 Jun 2019 15:15)  HR: 56 (21 Jun 2019 15:15) (56 - 98)  BP: 115/60 (21 Jun 2019 15:15) (115/60 - 175/76)  BP(mean): --  RR: 16 (21 Jun 2019 15:15) (16 - 18)  SpO2: 94% (21 Jun 2019 15:15) (94% - 99%)    Gen: well developed, well nourished, comfortable  Rectal: deferred  Extremities: no clubbing/cyanosis, no edema, no calf tenderness  Vascular:  DP/PT 2+ b/l  Neurological: no focal deficits  Skin: no rash on visible skin  Musculoskeletal:  Pain at groin during ROM of the hip joint. PTP at the groin.      LABS:                        11.6   7.09  )-----------( 170      ( 21 Jun 2019 10:33 )             36.0     06-21    140  |  107  |  22<H>  ----------------------------<  159<H>  3.5   |  26  |  1.16    Ca    8.6      21 Jun 2019 10:33  Phos  3.4     06-21  Mg     2.0     06-21    TPro  6.9  /  Alb  3.3<L>  /  TBili  0.6  /  DBili  x   /  AST  22  /  ALT  23  /  AlkPhos  62  06-21    PT/INR - ( 21 Jun 2019 00:58 )   PT: 11.1 sec;   INR: 1.00 ratio         PTT - ( 21 Jun 2019 00:58 )  PTT:23.8 sec    RADIOLOGY: MPRESSION:    Mildly displaced and mildly comminuted fracture of the right iliac bone   just below level of the right SI joint without extension to the   acetabulum. Acute right superior and inferior pubic rami fractures with   old healed bilateral inferior pubic rami fractures.    Degenerative changes as above.    No right hip fracture.        IMPRESSION: Pt is a  89y Female with Fx R iliac bone, pubic Rami fx     PLAN:  -  Pain management  -  DVT prophylaxis  -  Daily PT - WBAT with walker  -  No surgical intervention needed.  F/u with Dr Carmona  -  Case d/w Dr. Carmona

## 2019-06-21 NOTE — DISCHARGE NOTE PROVIDER - HOSPITAL COURSE
HPI:    89 yr old F from home ambulates with walker, with PMH of TIA, Htn, Thyroid disease, GERD, multiple falls with vertebral fractures came with complain of Rt buttock pain s/p mechanical fall x 1 day.        Patient is AXO=3 states she was trying to walk with walker and slipped, fell on floor and hit her head. He has Rt buttock pain with is 8/10 in intensity, aggravated on turning on right side.  She denies fever, chills, diarrhea, nausea, vomiting, dizziness, LOC, numbness, tingling or any other complains.         In ED, patient's vital signs were remarkable for BP of 175/75, Labs were grossly normal, CT head unremarkable         Goals of care: DNR/DNI (21 Jun 2019 04:30) HPI:    89 yr old F from home ambulates with walker, with PMH of TIA, Htn, Thyroid disease, GERD, multiple falls with vertebral fractures came with complain of Rt buttock pain s/p mechanical fall x 1 day.        Patient is AXO=3 states she was trying to walk with walker and slipped, fell on floor and hit her head. He has Rt buttock pain with is 8/10 in intensity, aggravated on turning on right side.  She denies fever, chills, diarrhea, nausea, vomiting, dizziness, LOC, numbness, tingling or any other complains.         In ED, patient's vital signs were remarkable for BP of 175/75, Labs were grossly normal, CT head unremarkable         Goals of care: DNR/DNI (21 Jun 2019 04:30)    Her CT head was unremarkable and Xray hip and ankle does not show fracture but CT pelvis shows Mildly displaced and mildly comminuted fracture of the right iliac bone just below level of the right SI joint without extension to the acetabulum. Acute right superior and inferior pubic rami fractures with old healed bilateral inferior pubic rami fractures. Degenerative changes. Orthopedics was consulted and advised     She was on pain meds and PT advised home PT.    vitamin B12, Folic acid, UA    For Hypothyroidism, she is on levothyroxine 125mcg, has low TSH 0.3 likely due to stress. She is advised to follow up with PCP to repeat TSH level.    For Arrhythmia, c/w amiodarone.    For HTN (hypertension), she will continue with her home meds.    Patient is stable for discharge per attending and is advised to follow up with PCP as outpatient    Please refer to patient's complete medical chart with documents for a full hospital course, for this is only a brief summary. HPI:    89 yr old F from home ambulates with walker, with PMH of TIA, Htn, Thyroid disease, GERD, multiple falls with vertebral fractures came with complain of Rt buttock pain s/p mechanical fall x 1 day.        Patient is AXO=3 states she was trying to walk with walker and slipped, fell on floor and hit her head. He has Rt buttock pain with is 8/10 in intensity, aggravated on turning on right side.  She denies fever, chills, diarrhea, nausea, vomiting, dizziness, LOC, numbness, tingling or any other complains.         In ED, patient's vital signs were remarkable for BP of 175/75, Labs were grossly normal, CT head unremarkable         Goals of care: DNR/DNI (21 Jun 2019 04:30)    Her CT head was unremarkable and Xray hip and ankle does not show fracture but CT pelvis shows Mildly displaced and mildly comminuted fracture of the right iliac bone just below level of the right SI joint without extension to the acetabulum. Acute right superior and inferior pubic rami fractures with old healed bilateral inferior pubic rami fractures. Degenerative changes. Orthopedics was consulted and advised conservative management with pain control and weight bearing as tolerated and to use a walker. follow up with Dr. Carmona in clinic    She was on pain meds and PT advised home PT.    vitamin B12, Folic acid normal    For Hypothyroidism, she is on levothyroxine 125mcg, has low TSH 0.3 likely due to stress. She is advised to follow up with PCP to repeat TSH level.    For Arrhythmia, c/w amiodarone.    For HTN (hypertension), she will continue with her home meds.    Patient is stable for discharge per attending and is advised to follow up with PCP as outpatient    Please refer to patient's complete medical chart with documents for a full hospital course, for this is only a brief summary. HPI:    89 yr old F from home ambulates with walker, with PMH of TIA, Htn, Thyroid disease, GERD, multiple falls with vertebral fractures came with complain of Rt buttock pain s/p mechanical fall x 1 day.        Patient is AXO=3 states she was trying to walk with walker and slipped, fell on floor and hit her head. He has Rt buttock pain with is 8/10 in intensity, aggravated on turning on right side.  She denies fever, chills, diarrhea, nausea, vomiting, dizziness, LOC, numbness, tingling or any other complains.         In ED, patient's vital signs were remarkable for BP of 175/75, Labs were grossly normal, CT head unremarkable         Goals of care: DNR/DNI (21 Jun 2019 04:30)    Her CT head was unremarkable and Xray hip and ankle does not show fracture but CT pelvis shows Mildly displaced and mildly comminuted fracture of the right iliac bone just below level of the right SI joint without extension to the acetabulum. Acute right superior and inferior pubic rami fractures with old healed bilateral inferior pubic rami fractures. Degenerative changes. Orthopedics was consulted and advised conservative management with pain control and weight bearing as tolerated and to use a walker. follow up with Dr. Carmona in clinic    She was on pain meds and PT advised home PT.    vitamin B12, Folic acid normal    For Hypothyroidism, she is on levothyroxine 125mcg, has low TSH 0.3 likely due to stress. She is advised to follow up with PCP to repeat TSH level.    For Arrhythmia, c/w amiodarone.    For HTN (hypertension), she will continue with her home meds.    Patient is stable for discharge per attending and is advised to follow up with PCP as outpatient    Please refer to patient's complete medical chart with documents for a full hospital course, for this is only a brief summary.        The patient was discharged in stable medical condition.

## 2019-06-21 NOTE — PHYSICAL THERAPY INITIAL EVALUATION ADULT - ACTIVE RANGE OF MOTION EXAMINATION, REHAB EVAL
Right ankle-WFL/Left LE Active ROM was WFL (within functional limits)/bilateral upper extremity Active ROM was WFL (within functional limits)

## 2019-06-21 NOTE — H&P ADULT - NSICDXPASTMEDICALHX_GEN_ALL_CORE_FT
PAST MEDICAL HISTORY:  H/O cardiac arrhythmia     H/O gastroesophageal reflux (GERD)     H/O: hypothyroidism     HTN (hypertension)

## 2019-06-21 NOTE — DISCHARGE NOTE PROVIDER - CARE PROVIDER_API CALL
Jason Carmona)  Orthopaedic Surgery  [id number] [place] [address], [address]  [place] [address]  Phone: [phone]  Fax: [phone]  Follow Up Time:

## 2019-06-21 NOTE — PROGRESS NOTE ADULT - PROBLEM SELECTOR PLAN 2
p/w gluteal pain s/p mechanical fall  c/w pain medications  Patient refused morphine c/w levothyroxine,  low TSH  fu free T3, T4

## 2019-06-21 NOTE — PROGRESS NOTE ADULT - PROBLEM SELECTOR PLAN 5
Takes amlodipine as needed, lisinopriland propanolol for BP  started propanolol and lisinopril  Will hold amlodipine Takes amlodipine as needed, lisinopril and propanolol for BP  started propanolol and lisinopril  Will hold amlodipine RISK                                                          Points  [  ] Previous VTE                                                3  [  ] Thrombophilia                                             2  [  ] Lower limb paralysis                                   2        (unable to hold up >15 seconds)    [  ] Current Cancer                                             2         (within 6 months)  [ x ] Immobilization > 24 hrs                              1  [  ] ICU/CCU stay > 24 hours                             1  [ x ] Age > 60                                                         1    IMPROVE VTE Score: 2  lovenox for VTE prophylaxis.

## 2019-06-21 NOTE — H&P ADULT - HISTORY OF PRESENT ILLNESS
89 yr old F from home ambulates with walker, with PMH of TIA, Htn, Thyroid disease, GERD, multiple falls with vertebral fractures came with complain of Rt buttock pain s/p mechanical fall x 1 day.    Patient is AXO=3 states she was trying to walk with walker and slipped, fell on floor and hit her head. He has Rt buttock pain with is 8/10 in intensity, aggravated on turning on right side,  She denies fever, chills, diarrhea, nausea, vomiting, dizziness, LOC or any other complains. 89 yr old F from home ambulates with walker, with PMH of TIA, Htn, Thyroid disease, GERD, multiple falls with vertebral fractures came with complain of Rt buttock pain s/p mechanical fall x 1 day.    Patient is AXO=3 states she was trying to walk with walker and slipped, fell on floor and hit her head. He has Rt buttock pain with is 8/10 in intensity, aggravated on turning on right side.  She denies fever, chills, diarrhea, nausea, vomiting, dizziness, LOC, numbness, tingling or any other complains.     Goals of care: DNR/DNI 89 yr old F from home ambulates with walker, with PMH of TIA, Htn, Thyroid disease, GERD, multiple falls with vertebral fractures came with complain of Rt buttock pain s/p mechanical fall x 1 day.    Patient is AXO=3 states she was trying to walk with walker and slipped, fell on floor and hit her head. He has Rt buttock pain with is 8/10 in intensity, aggravated on turning on right side.  She denies fever, chills, diarrhea, nausea, vomiting, dizziness, LOC, numbness, tingling or any other complains.     In ED, patient's vital signs were remarkable for BP of 175/75, Labs were grossly normal, CT head unremarkable     Goals of care: DNR/DNI

## 2019-06-21 NOTE — PROGRESS NOTE ADULT - SUBJECTIVE AND OBJECTIVE BOX
PGY 1 Note discussed with supervising resident and primary attending    Patient is a 89y old  Female who presents with a chief complaint of Rt Buttock pain s/p Mechanical fall (21 Jun 2019 04:30)      INTERVAL HPI/OVERNIGHT EVENTS: offers no new complaints; current symptoms resolving    MEDICATIONS  (STANDING):  amiodarone    Tablet 200 milliGRAM(s) Oral <User Schedule>  atorvastatin 20 milliGRAM(s) Oral at bedtime  enoxaparin Injectable 40 milliGRAM(s) SubCutaneous daily  ketorolac   Injectable 15 milliGRAM(s) IV Push once  levothyroxine 125 MICROGram(s) Oral <User Schedule>  lidocaine   Patch 1 Patch Transdermal daily  lisinopril 20 milliGRAM(s) Oral <User Schedule>  oxyCODONE    IR 5 milliGRAM(s) Oral three times a day  propranolol 40 milliGRAM(s) Oral <User Schedule>    MEDICATIONS  (PRN):  acetaminophen   Tablet .. 650 milliGRAM(s) Oral every 6 hours PRN Temp greater or equal to 38C (100.4F), Mild Pain (1 - 3)      __________________________________________________  REVIEW OF SYSTEMS:    CONSTITUTIONAL: No fever,   EYES: no acute visual disturbances  NECK: No pain or stiffness  RESPIRATORY: No cough; No shortness of breath  CARDIOVASCULAR: No chest pain, no palpitations  GASTROINTESTINAL: No pain. No nausea or vomiting; No diarrhea   NEUROLOGICAL: No headache or numbness, no tremors  MUSCULOSKELETAL: No joint pain, no muscle pain  GENITOURINARY: no dysuria, no frequency, no hesitancy  PSYCHIATRY: no depression , no anxiety  ALL OTHER  ROS negative        Vital Signs Last 24 Hrs  T(C): 36.6 (21 Jun 2019 07:57), Max: 36.6 (20 Jun 2019 23:24)  T(F): 97.9 (21 Jun 2019 07:57), Max: 97.9 (21 Jun 2019 07:57)  HR: 88 (21 Jun 2019 07:57) (59 - 98)  BP: 134/62 (21 Jun 2019 07:57) (123/89 - 175/76)  BP(mean): --  RR: 18 (21 Jun 2019 07:57) (16 - 18)  SpO2: 98% (21 Jun 2019 07:57) (95% - 99%)    ________________________________________________  PHYSICAL EXAM:  GENERAL: NAD  HEENT: Normocephalic;  conjunctivae and sclerae clear; moist mucous membranes;   NECK : supple  CHEST/LUNG: Clear to auscultation bilaterally with good air entry   HEART: S1 S2  regular; no murmurs, gallops or rubs  ABDOMEN: Soft, Nontender, Nondistended; Bowel sounds present  EXTREMITIES: no cyanosis; no edema; no calf tenderness  SKIN: warm and dry; no rash  NERVOUS SYSTEM:  Awake and alert; Oriented  to place, person and time ; no new deficits    _________________________________________________  LABS:                        12.3   9.88  )-----------( 184      ( 21 Jun 2019 00:58 )             38.0     06-21    141  |  108  |  24<H>  ----------------------------<  116<H>  4.7   |  29  |  1.29    Ca    8.6      21 Jun 2019 00:58    TPro  7.1  /  Alb  3.3<L>  /  TBili  0.4  /  DBili  x   /  AST  25  /  ALT  26  /  AlkPhos  64  06-21    PT/INR - ( 21 Jun 2019 00:58 )   PT: 11.1 sec;   INR: 1.00 ratio         PTT - ( 21 Jun 2019 00:58 )  PTT:23.8 sec    CAPILLARY BLOOD GLUCOSE      POCT Blood Glucose.: 122 mg/dL (20 Jun 2019 23:30)        RADIOLOGY & ADDITIONAL TESTS:    Imaging Personally Reviewed:  YES/NO    Consultant(s) Notes Reviewed:   YES/ No    Care Discussed with Consultants :     Plan of care was discussed with patient and /or primary care giver; all questions and concerns were addressed and care was aligned with patient's wishes. PGY 1 Note discussed with supervising resident and primary attending    Patient is a 89y old  Female who presents with a chief complaint of Rt Buttock pain s/p Mechanical fall (21 Jun 2019 04:30)      INTERVAL HPI/OVERNIGHT EVENTS: no acute overnight events, pt. seen and examined at bedside, complaints of Rt. hip pain    MEDICATIONS  (STANDING):  amiodarone    Tablet 200 milliGRAM(s) Oral <User Schedule>  atorvastatin 20 milliGRAM(s) Oral at bedtime  enoxaparin Injectable 40 milliGRAM(s) SubCutaneous daily  ketorolac   Injectable 15 milliGRAM(s) IV Push once  levothyroxine 125 MICROGram(s) Oral <User Schedule>  lidocaine   Patch 1 Patch Transdermal daily  lisinopril 20 milliGRAM(s) Oral <User Schedule>  oxyCODONE    IR 5 milliGRAM(s) Oral three times a day  propranolol 40 milliGRAM(s) Oral <User Schedule>    MEDICATIONS  (PRN):  acetaminophen   Tablet .. 650 milliGRAM(s) Oral every 6 hours PRN Temp greater or equal to 38C (100.4F), Mild Pain (1 - 3)      __________________________________________________  REVIEW OF SYSTEMS:    CONSTITUTIONAL: No fever,   EYES: no acute visual disturbances  NECK: No pain or stiffness  RESPIRATORY: No cough; No shortness of breath  CARDIOVASCULAR: No chest pain, no palpitations  GASTROINTESTINAL: No pain. No nausea or vomiting; No diarrhea   NEUROLOGICAL: No headache or numbness, no tremors  MUSCULOSKELETAL: No joint pain, no muscle pain, +rt. hip pain  GENITOURINARY: no dysuria, no frequency, no hesitancy  PSYCHIATRY: no depression , no anxiety  ALL OTHER  ROS negative        Vital Signs Last 24 Hrs  T(C): 36.6 (21 Jun 2019 07:57), Max: 36.6 (20 Jun 2019 23:24)  T(F): 97.9 (21 Jun 2019 07:57), Max: 97.9 (21 Jun 2019 07:57)  HR: 88 (21 Jun 2019 07:57) (59 - 98)  BP: 134/62 (21 Jun 2019 07:57) (123/89 - 175/76)  BP(mean): --  RR: 18 (21 Jun 2019 07:57) (16 - 18)  SpO2: 98% (21 Jun 2019 07:57) (95% - 99%)    ________________________________________________  PHYSICAL EXAM:  GENERAL: NAD  HEENT: Normocephalic;  conjunctivae and sclerae clear; moist mucous membranes;   NECK : supple  CHEST/LUNG: Clear to auscultation bilaterally with good air entry   HEART: S1 S2  regular; no murmurs, gallops or rubs  ABDOMEN: Soft, Nontender, Nondistended; Bowel sounds present  EXTREMITIES: no cyanosis; no edema; no calf tenderness  SKIN: warm and dry; no rash  NERVOUS SYSTEM:  Awake and alert; Oriented  to place, person and time ; no new deficits    _________________________________________________  LABS:                        12.3   9.88  )-----------( 184      ( 21 Jun 2019 00:58 )             38.0     06-21    141  |  108  |  24<H>  ----------------------------<  116<H>  4.7   |  29  |  1.29    Ca    8.6      21 Jun 2019 00:58    TPro  7.1  /  Alb  3.3<L>  /  TBili  0.4  /  DBili  x   /  AST  25  /  ALT  26  /  AlkPhos  64  06-21    PT/INR - ( 21 Jun 2019 00:58 )   PT: 11.1 sec;   INR: 1.00 ratio         PTT - ( 21 Jun 2019 00:58 )  PTT:23.8 sec    CAPILLARY BLOOD GLUCOSE      POCT Blood Glucose.: 122 mg/dL (20 Jun 2019 23:30)        RADIOLOGY & ADDITIONAL TESTS:    Imaging Personally Reviewed:   YES    Consultant(s) Notes Reviewed:   YES    Care Discussed with Consultants :  YES    Plan of care was discussed with patient and /or primary care giver; all questions and concerns were addressed and care was aligned with patient's wishes.

## 2019-06-21 NOTE — ED PROVIDER NOTE - PHYSICAL EXAMINATION
GENERAL: wells appearing, no acute distress   HEAD: atraumatic   EYES: EOMI, pink conjunctiva   ENT: moist oral mucosa   CARDIAC: RRR, no edema, distal pulses present   RESPIRATORY: lungs CTAB, no increased work of breathing   GASTROINTESTINAL: no abdominal tenderness, no rebound or guarding, bowel sounds presents  GENITOURINARY: no CVA tenderness   MUSCULOSKELETAL: no deformity; +R SI joint ttp without deformity; no hip ttp; no midline back ttp or step offs   NEUROLOGICAL: AAOx3, CN's II-XII intact, strength 5/5 bilateral UE and LE, sensation intact to light touch, finger to nose intact  SKIN: intact   PSYCHIATRIC: cooperative  HEME LYMPH: no lymphadenopathy

## 2019-06-21 NOTE — H&P ADULT - PROBLEM SELECTOR PLAN 5
Takes amlodipine as needed, lisinopriland propanolol for BP  started propanolol and lisinopril  Will hold amlodipine

## 2019-06-21 NOTE — CONSULT NOTE ADULT - ATTENDING COMMENTS
- 89 yo female, s/p fall  - Right iliac fracture and sup and inf pubic rami fx  - Plan for non-operative treatment.  - May be oob and use a walker.  - May d/c and f/u in office

## 2019-06-21 NOTE — ED PROVIDER NOTE - PROGRESS NOTE DETAILS
xray - no fracture  CT head - no ICH  labs - SANDRA vs CKD  Pt unable to walk in ED. Pt's daughter requesting no opiates. Will admit for inability to ambulate. Pt's PCP not on admitting list, so admitted to unattached / Dr Elliott. Endorsed to [facility].

## 2019-06-21 NOTE — PHYSICAL THERAPY INITIAL EVALUATION ADULT - CRITERIA FOR SKILLED THERAPEUTIC INTERVENTIONS
rehab potential/impairments found/predicted duration of therapy intervention/therapy frequency/anticipated discharge recommendation

## 2019-06-22 ENCOUNTER — TRANSCRIPTION ENCOUNTER (OUTPATIENT)
Age: 84
End: 2019-06-22

## 2019-06-22 VITALS
OXYGEN SATURATION: 97 % | HEART RATE: 78 BPM | DIASTOLIC BLOOD PRESSURE: 62 MMHG | SYSTOLIC BLOOD PRESSURE: 112 MMHG | TEMPERATURE: 98 F | RESPIRATION RATE: 16 BRPM

## 2019-06-22 DIAGNOSIS — N30.90 CYSTITIS, UNSPECIFIED WITHOUT HEMATURIA: ICD-10-CM

## 2019-06-22 LAB — HBA1C BLD-MCNC: 5.6 % — SIGNIFICANT CHANGE UP (ref 4–5.6)

## 2019-06-22 PROCEDURE — 99232 SBSQ HOSP IP/OBS MODERATE 35: CPT | Mod: GC

## 2019-06-22 PROCEDURE — 85610 PROTHROMBIN TIME: CPT

## 2019-06-22 PROCEDURE — 70450 CT HEAD/BRAIN W/O DYE: CPT

## 2019-06-22 PROCEDURE — 83036 HEMOGLOBIN GLYCOSYLATED A1C: CPT

## 2019-06-22 PROCEDURE — 72170 X-RAY EXAM OF PELVIS: CPT

## 2019-06-22 PROCEDURE — 86900 BLOOD TYPING SEROLOGIC ABO: CPT

## 2019-06-22 PROCEDURE — 84100 ASSAY OF PHOSPHORUS: CPT

## 2019-06-22 PROCEDURE — 97116 GAIT TRAINING THERAPY: CPT

## 2019-06-22 PROCEDURE — 97530 THERAPEUTIC ACTIVITIES: CPT

## 2019-06-22 PROCEDURE — 73502 X-RAY EXAM HIP UNI 2-3 VIEWS: CPT

## 2019-06-22 PROCEDURE — 81001 URINALYSIS AUTO W/SCOPE: CPT

## 2019-06-22 PROCEDURE — 86901 BLOOD TYPING SEROLOGIC RH(D): CPT

## 2019-06-22 PROCEDURE — 36415 COLL VENOUS BLD VENIPUNCTURE: CPT

## 2019-06-22 PROCEDURE — 85027 COMPLETE CBC AUTOMATED: CPT

## 2019-06-22 PROCEDURE — 72192 CT PELVIS W/O DYE: CPT

## 2019-06-22 PROCEDURE — 93005 ELECTROCARDIOGRAM TRACING: CPT

## 2019-06-22 PROCEDURE — 84443 ASSAY THYROID STIM HORMONE: CPT

## 2019-06-22 PROCEDURE — 80061 LIPID PANEL: CPT

## 2019-06-22 PROCEDURE — 82607 VITAMIN B-12: CPT

## 2019-06-22 PROCEDURE — 99285 EMERGENCY DEPT VISIT HI MDM: CPT | Mod: 25

## 2019-06-22 PROCEDURE — 82962 GLUCOSE BLOOD TEST: CPT

## 2019-06-22 PROCEDURE — 85730 THROMBOPLASTIN TIME PARTIAL: CPT

## 2019-06-22 PROCEDURE — 86850 RBC ANTIBODY SCREEN: CPT

## 2019-06-22 PROCEDURE — 80053 COMPREHEN METABOLIC PANEL: CPT

## 2019-06-22 PROCEDURE — 83735 ASSAY OF MAGNESIUM: CPT

## 2019-06-22 PROCEDURE — 82746 ASSAY OF FOLIC ACID SERUM: CPT

## 2019-06-22 RX ORDER — SENNA PLUS 8.6 MG/1
2 TABLET ORAL
Qty: 30 | Refills: 0
Start: 2019-06-22 | End: 2019-07-06

## 2019-06-22 RX ORDER — KETOROLAC TROMETHAMINE 30 MG/ML
1 SYRINGE (ML) INJECTION
Qty: 60 | Refills: 0
Start: 2019-06-22 | End: 2019-07-06

## 2019-06-22 RX ORDER — GABAPENTIN 400 MG/1
1 CAPSULE ORAL
Qty: 15 | Refills: 0
Start: 2019-06-22 | End: 2019-07-06

## 2019-06-22 RX ORDER — LIDOCAINE 4 G/100G
1 CREAM TOPICAL
Qty: 15 | Refills: 0
Start: 2019-06-22 | End: 2019-07-06

## 2019-06-22 RX ORDER — CEPHALEXIN 500 MG
1 CAPSULE ORAL
Qty: 10 | Refills: 0
Start: 2019-06-22 | End: 2019-06-26

## 2019-06-22 RX ORDER — KETOROLAC TROMETHAMINE 30 MG/ML
1 SYRINGE (ML) INJECTION
Qty: 20 | Refills: 0
Start: 2019-06-22 | End: 2019-06-26

## 2019-06-22 RX ORDER — DOCUSATE SODIUM 100 MG
1 CAPSULE ORAL
Qty: 15 | Refills: 0
Start: 2019-06-22 | End: 2019-07-06

## 2019-06-22 RX ORDER — ACETAMINOPHEN 500 MG
2 TABLET ORAL
Qty: 120 | Refills: 0
Start: 2019-06-22 | End: 2019-07-06

## 2019-06-22 RX ADMIN — Medication 15 MILLIGRAM(S): at 11:15

## 2019-06-22 RX ADMIN — Medication 15 MILLIGRAM(S): at 10:57

## 2019-06-22 RX ADMIN — AMIODARONE HYDROCHLORIDE 200 MILLIGRAM(S): 400 TABLET ORAL at 09:14

## 2019-06-22 RX ADMIN — LISINOPRIL 20 MILLIGRAM(S): 2.5 TABLET ORAL at 09:14

## 2019-06-22 RX ADMIN — LIDOCAINE 1 PATCH: 4 CREAM TOPICAL at 00:25

## 2019-06-22 RX ADMIN — LIDOCAINE 1 PATCH: 4 CREAM TOPICAL at 11:10

## 2019-06-22 RX ADMIN — Medication 125 MICROGRAM(S): at 09:14

## 2019-06-22 RX ADMIN — Medication 40 MILLIGRAM(S): at 09:14

## 2019-06-22 NOTE — PROGRESS NOTE ADULT - ATTENDING COMMENTS
Pt stable for discharge.
Patient was seen and examined by myself. Case was discussed with house staff in details. I have reviewed and agree with the plan as outlined above with edits where appropriate.    < from: CT Pelvis Bony Only No Cont (06.21.19 @ 11:59) >    IMPRESSION:    Mildly displaced and mildly comminuted fracture of the right iliac bone   just below level of the right SI joint without extension to the   acetabulum. Acute right superior and inferior pubic rami fractures with   old healed bilateral inferior pubic rami fractures.  Degenerative changes as above.  No right hip fracture.    < end of copied text >      A/P: Pubic ramus fracture and Right iliac bone fracture:  - Acute pain due to above  - degenerative joint disease      Ct findings reviewed with patient and her daughter.  Discussed with ortho team- non surgical intervention  seen by PT.  Patient and daughter wish for patient to go home and really do not want rehab- has 24 hr privately paid home attendant.  Anticipate discharge home over the weekend if pain controlled and she remains clinically stable.

## 2019-06-22 NOTE — PROGRESS NOTE ADULT - SUBJECTIVE AND OBJECTIVE BOX
MEDICAL ATTENDING NOTE    Patient is a 89y old  Female who presents with a chief complaint of Rt Buttock pain s/p Mechanical fall (2019 18:36)      INTERVAL HPI/OVERNIGHT EVENTS: Pt seen and examined at bedside with son. Pt having mild pain when walking with PT. Would like to go home today. Has private 24 hour HHA waiting at home.     MEDICATIONS  (STANDING):  acetaminophen   Tablet .. 650 milliGRAM(s) Oral every 6 hours  amiodarone    Tablet 200 milliGRAM(s) Oral <User Schedule>  atorvastatin 20 milliGRAM(s) Oral at bedtime  enoxaparin Injectable 40 milliGRAM(s) SubCutaneous daily  gabapentin 100 milliGRAM(s) Oral at bedtime  levothyroxine 125 MICROGram(s) Oral <User Schedule>  lidocaine   Patch 1 Patch Transdermal daily  lisinopril 20 milliGRAM(s) Oral <User Schedule>  propranolol 40 milliGRAM(s) Oral <User Schedule>    MEDICATIONS  (PRN):  ketorolac   Injectable 15 milliGRAM(s) IV Push every 8 hours PRN Severe Pain (7 - 10)  melatonin 3 milliGRAM(s) Oral at bedtime PRN Insomnia      __________________________________________________  REVIEW OF SYSTEMS:    CONSTITUTIONAL: No fever,   RESPIRATORY: No cough; No shortness of breath  CARDIOVASCULAR: No chest pain, no palpitations  GASTROINTESTINAL: No pain. No nausea or vomiting; No diarrhea   NEUROLOGICAL: No headache or numbness, no tremors  MUSCULOSKELETAL: Mild pelvic pain on walking  GENITOURINARY: frequency no buring      Vital Signs Last 24 Hrs  T(C): 36.7 (2019 07:51), Max: 36.7 (2019 15:15)  T(F): 98 (2019 07:51), Max: 98.1 (2019 15:15)  HR: 59 (2019 10:53) (52 - 59)  BP: 117/60 (2019 10:53) (112/49 - 148/63)  BP(mean): --  RR: 18 (2019 07:51) (16 - 18)  SpO2: 98% (2019 10:53) (94% - 98%)    ________________________________________________  PHYSICAL EXAM:  GENERAL: NAD  HEENT: Normocephalic;  conjunctivae and sclerae clear; moist mucous membranes;   CHEST/LUNG: Clear to auscultation bilaterally with good air entry   HEART: S1 S2  regular; no murmurs, gallops or rubs  ABDOMEN: Soft, Nontender, Nondistended; Bowel sounds present  EXTREMITIES: no cyanosis; no edema; no calf tenderness  SKIN: warm and dry; no rash  NERVOUS SYSTEM:  Awake and alert; Oriented  to place, person and time    _________________________________________________  LABS:                        11.6   7.09  )-----------( 170      ( 2019 10:33 )             36.0     06-    140  |  107  |  22<H>  ----------------------------<  159<H>  3.5   |  26  |  1.16    Ca    8.6      2019 10:33  Phos  3.4     -  Mg     2.0         TPro  6.9  /  Alb  3.3<L>  /  TBili  0.6  /  DBili  x   /  AST  22  /  ALT  23  /  AlkPhos  62  -21    PT/INR - ( 2019 00:58 )   PT: 11.1 sec;   INR: 1.00 ratio         PTT - ( 2019 00:58 )  PTT:23.8 sec  Urinalysis Basic - ( 2019 22:18 )    Color: Yellow / Appearance: very cloudy / S.020 / pH: x  Gluc: x / Ketone: Trace  / Bili: Small / Urobili: 1   Blood: x / Protein: 100 / Nitrite: Negative   Leuk Esterase: Moderate / RBC: 10-25 /HPF / WBC 26-50 /HPF   Sq Epi: x / Non Sq Epi: Moderate /HPF / Bacteria: Moderate /HPF      CAPILLARY BLOOD GLUCOSE            RADIOLOGY & ADDITIONAL TESTS:    Imaging Personally Reviewed:  Yes    Consultant(s) Notes Reviewed:   Yes        Plan of care was discussed with patient and /or primary care giver; all questions and concerns were addressed and care was aligned with patient's wishes.

## 2019-06-22 NOTE — DISCHARGE NOTE NURSING/CASE MANAGEMENT/SOCIAL WORK - NSDCDPATPORTLINK_GEN_ALL_CORE
You can access the [facility] Patient Portal, offered by [facility], by registering with the following website: [email or web address]

## 2019-06-22 NOTE — PROGRESS NOTE ADULT - PROBLEM SELECTOR PLAN 6
RISK                                                          Points  [  ] Previous VTE                                                3  [  ] Thrombophilia                                             2  [  ] Lower limb paralysis                                   2        (unable to hold up >15 seconds)    [  ] Current Cancer                                             2         (within 6 months)  [ x ] Immobilization > 24 hrs                              1  [  ] ICU/CCU stay > 24 hours                             1  [ x ] Age > 60                                                         1    IMPROVE VTE Score: 2  lovenox for VTE prophylaxis.
UA does have some squamous epithelial cells but given her symptoms  Will d/c on Keflex x 5 days

## 2019-06-22 NOTE — PROGRESS NOTE ADULT - ASSESSMENT
89 yr old F from home ambulates with walker, with PMH of TIA, Htn, Thyroid disease, GERD, multiple falls with vertebral fractures came with complain of Rt buttock pain s/p mechanical fall x 1 day.    Patient is admitted for mechanical fall, Unable to walk
89 yr old F from home ambulates with walker, with PMH of TIA, Htn, Thyroid disease, GERD, multiple falls with vertebral fractures came with complain of Rt buttock pain s/p mechanical fall x 1 day.    Patient is admitted for mechanical fall, Unable to walk

## 2019-06-22 NOTE — PROGRESS NOTE ADULT - PROBLEM SELECTOR PLAN 1
p/w mechanical fall  CT head unremarkable  Xray hip and ankle does not show fracture  CT pelvis shows Mildly displaced and mildly comminuted fracture of the right iliac bone just below level of the right SI joint without extension to the acetabulum. Acute right superior and inferior pubic rami fractures with old healed bilateral inferior pubic rami fractures. Degenerative changes  Orthopedics - conservative management, f/u outpatinet  PT consulted - Home with PT  Pain control - Toradol Tylenol Lidocaine patch and Gabapentin

## 2019-06-25 ENCOUNTER — RX RENEWAL (OUTPATIENT)
Age: 84
End: 2019-06-25

## 2019-06-25 PROBLEM — Z87.19 PERSONAL HISTORY OF OTHER DISEASES OF THE DIGESTIVE SYSTEM: Chronic | Status: ACTIVE | Noted: 2019-06-21

## 2019-06-25 PROBLEM — Z86.39 PERSONAL HISTORY OF OTHER ENDOCRINE, NUTRITIONAL AND METABOLIC DISEASE: Chronic | Status: ACTIVE | Noted: 2019-06-21

## 2019-06-25 PROBLEM — I10 ESSENTIAL (PRIMARY) HYPERTENSION: Chronic | Status: ACTIVE | Noted: 2019-06-21

## 2019-06-25 PROBLEM — Z86.79 PERSONAL HISTORY OF OTHER DISEASES OF THE CIRCULATORY SYSTEM: Chronic | Status: ACTIVE | Noted: 2019-06-21

## 2019-07-02 ENCOUNTER — APPOINTMENT (OUTPATIENT)
Dept: ORTHOPEDIC SURGERY | Facility: CLINIC | Age: 84
End: 2019-07-02
Payer: MEDICARE

## 2019-07-02 VITALS — SYSTOLIC BLOOD PRESSURE: 168 MMHG | DIASTOLIC BLOOD PRESSURE: 80 MMHG | HEART RATE: 56 BPM

## 2019-07-02 DIAGNOSIS — S32.591D OTHER SPECIFIED FRACTURE OF RIGHT PUBIS, SUBSEQUENT ENCOUNTER FOR FRACTURE WITH ROUTINE HEALING: ICD-10-CM

## 2019-07-02 DIAGNOSIS — S32.301D: ICD-10-CM

## 2019-07-02 PROCEDURE — 99203 OFFICE O/P NEW LOW 30 MIN: CPT

## 2019-07-08 ENCOUNTER — RX RENEWAL (OUTPATIENT)
Age: 84
End: 2019-07-08

## 2019-07-12 PROBLEM — S32.591D: Status: ACTIVE | Noted: 2019-07-12

## 2019-07-12 PROBLEM — S32.301D: Status: ACTIVE | Noted: 2019-07-12

## 2019-07-12 NOTE — DISCUSSION/SUMMARY
[de-identified] : 89-year-old female with pubic rami fracture as well as iliac crest fracture\par \par Acute injuries to the right pelvis, and given age of patient recommendation is to progress weightbearing as tolerated despite mildly displaced iliac crest injury. Recommendation would be for weightbearing as tolerated with assistive device including walker. Local pain management with lidocaine patches. Discussed that fracture will heal within 6-8wks, she may have difficulty with impact loading until that time.\par \par Recommend continued PT.\par \par Followup 4 weeks for repeat imaging.

## 2019-07-12 NOTE — PHYSICAL EXAM
[de-identified] : Images were reviewed from the ER dated 6.21.19. \par \par Multiple views right hip and pelvis show evidence of iliac fracture. Changes consistent with prior subacute and acute rami fractures.\par \par CT scan dated 6.21.19 shows evidence of mildly displaced right iliac fracture, right superior and inferior pubic rami fractures. [de-identified] : Oriented to time, place, person\par Mood: Normal\par Affect: Normal\par Appearance: Healthy, well appearing, no acute distress.\par Gait: Normal\par Assistive Devices: None\par \par Right hip exam\par \par Skin: Clean/dry and intact\par Inspection: No obvious deformity, no swelling.\par Pulses: 2+ DP/PT pulses\par ROM: Limited to 90° flexion, pain with internal/external rotation. \par Painful ROM: Pain with range of motion right hip \par Tenderness: Tender to palpation groin/iliac crest.\par Strength: 3+/5 hip flexion, 4/5 Gluteal strength, 4/5 hip ADD, 4/5 hip ABD, 4/5 Q/H, 5/5 TA/GS/EHL\par Neuro: Sensation in tact to light touch throughout

## 2019-07-12 NOTE — HISTORY OF PRESENT ILLNESS
[de-identified] : 89 year old female presents today with pelvic fx since 6/20/19. She fell at home and was taken to [facility] [place] ER and diagnosed with pelvic ring injury. She is a taking Tylenol and lidocaine patches. She presents in wheelchair. Unable to tolerate NSAIDs due to brain bleed 6-7 years ago. \par \par The patient's past medical history, past surgical history, medications and allergies were reviewed by me today with the patient and documented accordingly. In addition, the patient's family and social history, which were noncontributory to this visit, were reviewed also.

## 2019-08-08 ENCOUNTER — APPOINTMENT (OUTPATIENT)
Dept: GERIATRICS | Facility: CLINIC | Age: 84
End: 2019-08-08
Payer: MEDICARE

## 2019-08-08 VITALS
WEIGHT: 137 LBS | HEIGHT: 62 IN | SYSTOLIC BLOOD PRESSURE: 142 MMHG | HEART RATE: 59 BPM | OXYGEN SATURATION: 96 % | BODY MASS INDEX: 25.21 KG/M2 | TEMPERATURE: 97.5 F | DIASTOLIC BLOOD PRESSURE: 90 MMHG | RESPIRATION RATE: 16 BRPM

## 2019-08-08 DIAGNOSIS — S32.9XXA FRACTURE OF UNSPECIFIED PARTS OF LUMBOSACRAL SPINE AND PELVIS, INITIAL ENCOUNTER FOR CLOSED FRACTURE: ICD-10-CM

## 2019-08-08 PROCEDURE — 99214 OFFICE O/P EST MOD 30 MIN: CPT

## 2019-08-08 NOTE — ASSESSMENT
[FreeTextEntry1] : \par falls: completed home now going to outpatient PT, fall precaution\par osteoporosis: hx of taking fosamax for not even a year.  took calcium and vit D.  treatment for OP discussed, she will further discuss and follow up at next visit.  \par urinary frequency: resolved.  hx of uti while in hospital.  no need to repeat UA.\par hypothyroidism:  s/p thyroidectomy: repeat tsh\par htn; controlled: c/w current medications\par bloodwork before next visit and dexa as well.\par \par \par \par

## 2019-08-08 NOTE — PHYSICAL EXAM
[General Appearance - Alert] : alert [General Appearance - In No Acute Distress] : in no acute distress [Sclera] : the sclera and conjunctiva were normal [Extraocular Movements] : extraocular movements were intact [No Oral Cyanosis] : no oral cyanosis [No Oral Pallor] : no oral pallor [Neck Appearance] : the appearance of the neck was normal [] : no respiratory distress [Respiration, Rhythm And Depth] : normal respiratory rhythm and effort [Auscultation Breath Sounds / Voice Sounds] : lungs were clear to auscultation bilaterally [Exaggerated Use Of Accessory Muscles For Inspiration] : no accessory muscle use [Heart Rate And Rhythm] : heart rate was normal and rhythm regular [Heart Sounds] : normal S1 and S2 [Bowel Sounds] : normal bowel sounds [Abdomen Soft] : soft [Abdomen Tenderness] : non-tender [FreeTextEntry1] : shiny round 2cm growth on left ear concerning for bcc [Mood] : the mood was normal [Affect] : the affect was normal

## 2019-08-08 NOTE — HISTORY OF PRESENT ILLNESS
[FreeTextEntry1] : 89 yr old F from home with PMH of TIA, Htn, osteoporosis with multiple fragility fractures, hypothyroidism, presents after hospitalization after fall with a pelvic fracture.  \par \par CT head unremarkable  and Xray hip neg, but CT pelvis shows Mildly displaced and mildly comminuted fracture of the right iliac bone just below level of the right SI joint without extension to the \par acetabulum. Acute right superior and inferior pubic rami fractures with old \par healed bilateral inferior pubic rami fractures. Degenerative changes. \par Orthopedics was consulted and advised conservative management with pain control \par and weight bearing as tolerated and to use a walker. \par \par  \par in hospital TSH 0.3 \par \par she has complted home PT and is currently going to outpatient PT\par \par she has not yet received shringrix.\par \par new skin growth on her left ear, planning to see derm\par increased swelling of her feet after sitting.  \par \par \par

## 2019-08-08 NOTE — REVIEW OF SYSTEMS
[Lower Ext Edema] : lower extremity edema [Eyesight Problems] : eyesight problems [Dysuria] : no dysuria [Skin Lesions] : skin lesion [Negative] : Psychiatric [FreeTextEntry8] : 2 x nocturia

## 2019-08-13 ENCOUNTER — RX RENEWAL (OUTPATIENT)
Age: 84
End: 2019-08-13

## 2019-08-16 ENCOUNTER — LABORATORY RESULT (OUTPATIENT)
Age: 84
End: 2019-08-16

## 2019-08-16 ENCOUNTER — APPOINTMENT (OUTPATIENT)
Dept: DERMATOLOGY | Facility: CLINIC | Age: 84
End: 2019-08-16
Payer: MEDICARE

## 2019-08-16 PROCEDURE — 17004 DESTROY PREMAL LESIONS 15/>: CPT | Mod: 59

## 2019-08-16 PROCEDURE — 11102 TANGNTL BX SKIN SINGLE LES: CPT | Mod: 59

## 2019-08-27 ENCOUNTER — RX RENEWAL (OUTPATIENT)
Age: 84
End: 2019-08-27

## 2019-08-27 ENCOUNTER — NON-APPOINTMENT (OUTPATIENT)
Age: 84
End: 2019-08-27

## 2019-08-27 ENCOUNTER — APPOINTMENT (OUTPATIENT)
Dept: OPHTHALMOLOGY | Facility: CLINIC | Age: 84
End: 2019-08-27
Payer: MEDICARE

## 2019-08-27 PROCEDURE — 92012 INTRM OPH EXAM EST PATIENT: CPT

## 2019-08-29 ENCOUNTER — RESULT REVIEW (OUTPATIENT)
Age: 84
End: 2019-08-29

## 2019-09-03 ENCOUNTER — APPOINTMENT (OUTPATIENT)
Dept: DERMATOLOGY | Facility: CLINIC | Age: 84
End: 2019-09-03
Payer: MEDICARE

## 2019-09-03 PROCEDURE — 99214 OFFICE O/P EST MOD 30 MIN: CPT

## 2019-10-01 ENCOUNTER — APPOINTMENT (OUTPATIENT)
Dept: DERMATOLOGY | Facility: CLINIC | Age: 84
End: 2019-10-01
Payer: MEDICARE

## 2019-10-01 PROCEDURE — 15260 FTH/GFT FR N/E/E/L 20 SQCM/<: CPT

## 2019-10-01 PROCEDURE — 17311 MOHS 1 STAGE H/N/HF/G: CPT

## 2019-10-02 ENCOUNTER — FORM ENCOUNTER (OUTPATIENT)
Age: 84
End: 2019-10-02

## 2019-10-03 ENCOUNTER — CLINICAL ADVICE (OUTPATIENT)
Age: 84
End: 2019-10-03

## 2019-10-03 ENCOUNTER — OUTPATIENT (OUTPATIENT)
Dept: OUTPATIENT SERVICES | Facility: HOSPITAL | Age: 84
LOS: 1 days | End: 2019-10-03
Payer: MEDICARE

## 2019-10-03 ENCOUNTER — APPOINTMENT (OUTPATIENT)
Dept: RADIOLOGY | Facility: IMAGING CENTER | Age: 84
End: 2019-10-03
Payer: MEDICARE
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
## 2019-10-03 DIAGNOSIS — M54.2 CERVICALGIA: ICD-10-CM

## 2019-10-03 DIAGNOSIS — Z98.890 OTHER SPECIFIED POSTPROCEDURAL STATES: Chronic | ICD-10-CM

## 2019-10-03 PROCEDURE — 72040 X-RAY EXAM NECK SPINE 2-3 VW: CPT

## 2019-10-03 PROCEDURE — 72040 X-RAY EXAM NECK SPINE 2-3 VW: CPT | Mod: 26

## 2019-10-04 ENCOUNTER — APPOINTMENT (OUTPATIENT)
Dept: DERMATOLOGY | Facility: CLINIC | Age: 84
End: 2019-10-04
Payer: MEDICARE

## 2019-10-04 PROCEDURE — 99024 POSTOP FOLLOW-UP VISIT: CPT

## 2019-11-08 ENCOUNTER — APPOINTMENT (OUTPATIENT)
Dept: GERIATRICS | Facility: CLINIC | Age: 84
End: 2019-11-08
Payer: MEDICARE

## 2019-11-08 VITALS
TEMPERATURE: 97.6 F | HEART RATE: 67 BPM | SYSTOLIC BLOOD PRESSURE: 130 MMHG | OXYGEN SATURATION: 96 % | HEIGHT: 62 IN | WEIGHT: 136 LBS | BODY MASS INDEX: 25.03 KG/M2 | RESPIRATION RATE: 15 BRPM | DIASTOLIC BLOOD PRESSURE: 70 MMHG

## 2019-11-08 DIAGNOSIS — R20.0 ANESTHESIA OF SKIN: ICD-10-CM

## 2019-11-08 PROCEDURE — 99214 OFFICE O/P EST MOD 30 MIN: CPT

## 2019-11-08 RX ORDER — ZOSTER VACCINE RECOMBINANT, ADJUVANTED 50 MCG/0.5
50 KIT INTRAMUSCULAR
Qty: 1 | Refills: 0 | Status: DISCONTINUED | COMMUNITY
Start: 2019-06-18 | End: 2019-11-08

## 2019-11-08 NOTE — ASSESSMENT
[FreeTextEntry1] : OP: with multiple fragility fractures,  discussed r/b/a of fosamax, c/w calcium and vit D.\par \par htn: controlled c/w current medications\par check bloodwork\par \par afib:controlled.  remains on amio, advised to f/u with cardiology \par \par numbness: continue to monitor without medication.\par recurrent falls:  c/w walker use\par \par hypothyroidism:  c/w levo, f/u labwork

## 2019-11-08 NOTE — PHYSICAL EXAM
[General Appearance - In No Acute Distress] : in no acute distress [General Appearance - Alert] : alert [Extraocular Movements] : extraocular movements were intact [No Oral Cyanosis] : no oral cyanosis [No Oral Pallor] : no oral pallor [] : the neck was supple [Neck Appearance] : the appearance of the neck was normal [Exaggerated Use Of Accessory Muscles For Inspiration] : no accessory muscle use [Auscultation Breath Sounds / Voice Sounds] : lungs were clear to auscultation bilaterally [Respiration, Rhythm And Depth] : normal respiratory rhythm and effort [Heart Sounds] : normal S1 and S2 [Heart Rate And Rhythm] : heart rate was normal and rhythm regular [Abdomen Soft] : soft [Abdomen Tenderness] : non-tender [Bowel Sounds] : normal bowel sounds [No Spinal Tenderness] : no spinal tenderness [Nail Clubbing] : no clubbing  or cyanosis of the fingernails [Involuntary Movements] : no involuntary movements were seen [Affect] : the affect was normal [No Focal Deficits] : no focal deficits [Skin Color & Pigmentation] : normal skin color and pigmentation [Mood] : the mood was normal [FreeTextEntry1] : b/l pedal edema

## 2019-11-08 NOTE — HISTORY OF PRESENT ILLNESS
[FreeTextEntry1] : \par 89 yr old F from home with PMH of TIA, Htn, osteoporosis with multiple fragility fractures, hypothyroidism, presents for follow up. with her dtr and aide.\par \par denies any recurrent recent falls.\par has not yet received shingrix\par notes some left leg with numbness:  saw neurology in the past, plan to manage conservatively.\par without associated pain.  "feels like its asleep" notes some weakness\par denies shooting pain down her leg.  not overly bothersome to her.\par walks with walker \par \par op: eats ~3 servings of calcium rich foods daily: takes calcium/vit d supplement.\par \par \par

## 2019-11-18 LAB
25(OH)D3 SERPL-MCNC: 28.2 NG/ML
ALBUMIN SERPL ELPH-MCNC: 4.6 G/DL
ALP BLD-CCNC: 85 U/L
ALT SERPL-CCNC: 13 U/L
ANION GAP SERPL CALC-SCNC: 20 MMOL/L
AST SERPL-CCNC: 20 U/L
BASOPHILS # BLD AUTO: 0.02 K/UL
BASOPHILS NFR BLD AUTO: 0.3 %
BILIRUB SERPL-MCNC: 0.3 MG/DL
BUN SERPL-MCNC: 25 MG/DL
CALCIUM SERPL-MCNC: 9.9 MG/DL
CHLORIDE SERPL-SCNC: 105 MMOL/L
CO2 SERPL-SCNC: 22 MMOL/L
CREAT SERPL-MCNC: 1.26 MG/DL
EOSINOPHIL # BLD AUTO: 0.04 K/UL
EOSINOPHIL NFR BLD AUTO: 0.7 %
GLUCOSE SERPL-MCNC: 99 MG/DL
HCT VFR BLD CALC: 41.3 %
HGB BLD-MCNC: 13.2 G/DL
IMM GRANULOCYTES NFR BLD AUTO: 0.5 %
LDLC SERPL DIRECT ASSAY-MCNC: 109 MG/DL
LYMPHOCYTES # BLD AUTO: 1.47 K/UL
LYMPHOCYTES NFR BLD AUTO: 25.3 %
MAN DIFF?: NORMAL
MCHC RBC-ENTMCNC: 30.9 PG
MCHC RBC-ENTMCNC: 32 GM/DL
MCV RBC AUTO: 96.7 FL
MONOCYTES # BLD AUTO: 0.69 K/UL
MONOCYTES NFR BLD AUTO: 11.9 %
NEUTROPHILS # BLD AUTO: 3.56 K/UL
NEUTROPHILS NFR BLD AUTO: 61.3 %
PLATELET # BLD AUTO: 227 K/UL
POTASSIUM SERPL-SCNC: 4.4 MMOL/L
PROT SERPL-MCNC: 7.5 G/DL
RBC # BLD: 4.27 M/UL
RBC # FLD: 12.6 %
SODIUM SERPL-SCNC: 147 MMOL/L
TSH SERPL-ACNC: 2.34 UIU/ML
WBC # FLD AUTO: 5.81 K/UL

## 2019-11-20 ENCOUNTER — RX RENEWAL (OUTPATIENT)
Age: 84
End: 2019-11-20

## 2019-12-03 ENCOUNTER — RX RENEWAL (OUTPATIENT)
Age: 84
End: 2019-12-03

## 2020-02-19 ENCOUNTER — RX RENEWAL (OUTPATIENT)
Age: 85
End: 2020-02-19

## 2020-03-06 ENCOUNTER — APPOINTMENT (OUTPATIENT)
Dept: GERIATRICS | Facility: CLINIC | Age: 85
End: 2020-03-06

## 2020-03-24 ENCOUNTER — APPOINTMENT (OUTPATIENT)
Dept: DERMATOLOGY | Facility: CLINIC | Age: 85
End: 2020-03-24

## 2020-03-31 ENCOUNTER — RX RENEWAL (OUTPATIENT)
Age: 85
End: 2020-03-31

## 2020-04-20 ENCOUNTER — LABORATORY RESULT (OUTPATIENT)
Age: 85
End: 2020-04-20

## 2020-04-20 ENCOUNTER — APPOINTMENT (OUTPATIENT)
Dept: DERMATOLOGY | Facility: CLINIC | Age: 85
End: 2020-04-20
Payer: MEDICARE

## 2020-04-20 PROCEDURE — 11102 TANGNTL BX SKIN SINGLE LES: CPT

## 2020-04-28 ENCOUNTER — APPOINTMENT (OUTPATIENT)
Dept: DERMATOLOGY | Facility: CLINIC | Age: 85
End: 2020-04-28
Payer: MEDICARE

## 2020-04-28 PROCEDURE — 12032 INTMD RPR S/A/T/EXT 2.6-7.5: CPT

## 2020-04-28 PROCEDURE — 17313 MOHS 1 STAGE T/A/L: CPT

## 2020-05-11 ENCOUNTER — APPOINTMENT (OUTPATIENT)
Dept: DERMATOLOGY | Facility: CLINIC | Age: 85
End: 2020-05-11
Payer: MEDICARE

## 2020-05-11 PROCEDURE — 99024 POSTOP FOLLOW-UP VISIT: CPT

## 2020-05-19 ENCOUNTER — RX RENEWAL (OUTPATIENT)
Age: 85
End: 2020-05-19

## 2020-06-10 ENCOUNTER — APPOINTMENT (OUTPATIENT)
Dept: DERMATOLOGY | Facility: CLINIC | Age: 85
End: 2020-06-10
Payer: MEDICARE

## 2020-06-10 ENCOUNTER — LABORATORY RESULT (OUTPATIENT)
Age: 85
End: 2020-06-10

## 2020-06-10 DIAGNOSIS — L82.0 INFLAMED SEBORRHEIC KERATOSIS: ICD-10-CM

## 2020-06-10 DIAGNOSIS — C44.722 SQUAMOUS CELL CARCINOMA OF SKIN OF RIGHT LOWER LIMB, INCLUDING HIP: ICD-10-CM

## 2020-06-10 PROCEDURE — 17110 DESTRUCTION B9 LES UP TO 14: CPT

## 2020-06-10 PROCEDURE — 99213 OFFICE O/P EST LOW 20 MIN: CPT | Mod: 25

## 2020-06-10 PROCEDURE — 11102 TANGNTL BX SKIN SINGLE LES: CPT | Mod: 59

## 2020-06-18 ENCOUNTER — RX RENEWAL (OUTPATIENT)
Age: 85
End: 2020-06-18

## 2020-06-25 ENCOUNTER — APPOINTMENT (OUTPATIENT)
Dept: GERIATRICS | Facility: CLINIC | Age: 85
End: 2020-06-25
Payer: MEDICARE

## 2020-06-25 VITALS
RESPIRATION RATE: 14 BRPM | TEMPERATURE: 97.3 F | HEIGHT: 62 IN | OXYGEN SATURATION: 95 % | DIASTOLIC BLOOD PRESSURE: 78 MMHG | SYSTOLIC BLOOD PRESSURE: 130 MMHG | HEART RATE: 62 BPM | BODY MASS INDEX: 25.4 KG/M2 | WEIGHT: 138 LBS

## 2020-06-25 DIAGNOSIS — Z11.59 ENCOUNTER FOR SCREENING FOR OTHER VIRAL DISEASES: ICD-10-CM

## 2020-06-25 PROCEDURE — 99214 OFFICE O/P EST MOD 30 MIN: CPT

## 2020-06-25 NOTE — HISTORY OF PRESENT ILLNESS
[FreeTextEntry1] : 89 yr old F from home with PMH of TIA, Htn, osteoporosis with multiple fragility fractures, hypothyroidism, presents for follow up. with her dtr.\par \par reports occasional gas, takes omprazole prn and gas x.  reports most of the time she feels fine.\par +constipation,  did not tolerate miralax in past.  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  planning for mohs of lower leg .\par \par blood pressure well controlled today. reprots adherent with medications needs refills.

## 2020-06-25 NOTE — PHYSICAL EXAM
[General Appearance - In No Acute Distress] : in no acute distress [General Appearance - Alert] : alert [Sclera] : the sclera and conjunctiva were normal [Neck Appearance] : the appearance of the neck was normal [Extraocular Movements] : extraocular movements were intact [Outer Ear] : the ears and nose were normal in appearance [Respiration, Rhythm And Depth] : normal respiratory rhythm and effort [] : no respiratory distress [Exaggerated Use Of Accessory Muscles For Inspiration] : no accessory muscle use [Auscultation Breath Sounds / Voice Sounds] : lungs were clear to auscultation bilaterally [Heart Rate And Rhythm] : heart rate was normal and rhythm regular [Heart Sounds] : normal S1 and S2 [Edema] : there was no peripheral edema [Bowel Sounds] : normal bowel sounds [Abdomen Tenderness] : non-tender [Abdomen Soft] : soft [Involuntary Movements] : no involuntary movements were seen [Nail Clubbing] : no clubbing  or cyanosis of the fingernails [Affect] : the affect was normal [No Focal Deficits] : no focal deficits [Mood] : the mood was normal [FreeTextEntry1] : walker

## 2020-06-25 NOTE — REVIEW OF SYSTEMS
[As Noted in HPI] : as noted in HPI [Negative] : Musculoskeletal [Shortness Of Breath] : no shortness of breath [Lower Ext Edema] : no lower extremity edema [Cough] : no cough

## 2020-06-25 NOTE — ASSESSMENT
[FreeTextEntry1] : htn: controlled c/w current medications\par check bloodwork\par OP: with multiple fragility fractures, cw fosamax, c/w calcium .  no longer taking vit D\par afib:controlled. remains on amio, follows with cardiology\par c/w walker use for unsteady gait\par hypothyroidism: c/w levo, f/u labwork. \par

## 2020-06-29 LAB
25(OH)D3 SERPL-MCNC: 21.9 NG/ML
ALBUMIN SERPL ELPH-MCNC: 4.4 G/DL
ALP BLD-CCNC: 54 U/L
ALT SERPL-CCNC: 12 U/L
ANION GAP SERPL CALC-SCNC: 17 MMOL/L
AST SERPL-CCNC: 28 U/L
BASOPHILS # BLD AUTO: 0.03 K/UL
BASOPHILS NFR BLD AUTO: 0.5 %
BILIRUB SERPL-MCNC: 0.3 MG/DL
BUN SERPL-MCNC: 17 MG/DL
CALCIUM SERPL-MCNC: 9.3 MG/DL
CHLORIDE SERPL-SCNC: 106 MMOL/L
CO2 SERPL-SCNC: 21 MMOL/L
CREAT SERPL-MCNC: 1.09 MG/DL
EOSINOPHIL # BLD AUTO: 0.05 K/UL
EOSINOPHIL NFR BLD AUTO: 0.9 %
GLUCOSE SERPL-MCNC: 93 MG/DL
HCT VFR BLD CALC: 41.7 %
HGB BLD-MCNC: 12.8 G/DL
IMM GRANULOCYTES NFR BLD AUTO: 0.5 %
LYMPHOCYTES # BLD AUTO: 1.65 K/UL
LYMPHOCYTES NFR BLD AUTO: 28.2 %
MAN DIFF?: NORMAL
MCHC RBC-ENTMCNC: 30.7 GM/DL
MCHC RBC-ENTMCNC: 30.7 PG
MCV RBC AUTO: 100 FL
MONOCYTES # BLD AUTO: 0.62 K/UL
MONOCYTES NFR BLD AUTO: 10.6 %
NEUTROPHILS # BLD AUTO: 3.47 K/UL
NEUTROPHILS NFR BLD AUTO: 59.3 %
PLATELET # BLD AUTO: 203 K/UL
POTASSIUM SERPL-SCNC: 4.8 MMOL/L
PROT SERPL-MCNC: 7.1 G/DL
RBC # BLD: 4.17 M/UL
RBC # FLD: 13.3 %
SARS-COV-2 IGG SERPL IA-ACNC: 0.11 INDEX
SARS-COV-2 IGG SERPL QL IA: NEGATIVE
SODIUM SERPL-SCNC: 144 MMOL/L
TSH SERPL-ACNC: 0.62 UIU/ML
WBC # FLD AUTO: 5.85 K/UL

## 2020-07-13 ENCOUNTER — APPOINTMENT (OUTPATIENT)
Dept: DERMATOLOGY | Facility: CLINIC | Age: 85
End: 2020-07-13
Payer: MEDICARE

## 2020-07-13 VITALS — TEMPERATURE: 97.5 F

## 2020-07-13 PROCEDURE — 17263 DSTRJ MAL LES T/A/L 2.1-3.0: CPT

## 2020-07-14 ENCOUNTER — APPOINTMENT (OUTPATIENT)
Dept: OPHTHALMOLOGY | Facility: CLINIC | Age: 85
End: 2020-07-14
Payer: MEDICARE

## 2020-07-14 ENCOUNTER — NON-APPOINTMENT (OUTPATIENT)
Age: 85
End: 2020-07-14

## 2020-07-14 PROCEDURE — 68761 CLOSE TEAR DUCT OPENING: CPT | Mod: E2,E4

## 2020-07-14 PROCEDURE — 92012 INTRM OPH EXAM EST PATIENT: CPT | Mod: 25

## 2020-08-11 ENCOUNTER — RX RENEWAL (OUTPATIENT)
Age: 85
End: 2020-08-11

## 2020-09-08 ENCOUNTER — APPOINTMENT (OUTPATIENT)
Dept: DERMATOLOGY | Facility: CLINIC | Age: 85
End: 2020-09-08
Payer: MEDICARE

## 2020-09-08 ENCOUNTER — LABORATORY RESULT (OUTPATIENT)
Age: 85
End: 2020-09-08

## 2020-09-08 VITALS — WEIGHT: 138 LBS | BODY MASS INDEX: 26.06 KG/M2 | HEIGHT: 61 IN

## 2020-09-08 VITALS — TEMPERATURE: 97.6 F

## 2020-09-08 DIAGNOSIS — D48.5 NEOPLASM OF UNCERTAIN BEHAVIOR OF SKIN: ICD-10-CM

## 2020-09-08 DIAGNOSIS — Z85.828 PERSONAL HISTORY OF OTHER MALIGNANT NEOPLASM OF SKIN: ICD-10-CM

## 2020-09-08 PROCEDURE — 17000 DESTRUCT PREMALG LESION: CPT | Mod: GC,59

## 2020-09-08 PROCEDURE — 17003 DESTRUCT PREMALG LES 2-14: CPT | Mod: GC

## 2020-09-08 PROCEDURE — 11102 TANGNTL BX SKIN SINGLE LES: CPT | Mod: GC

## 2020-09-08 PROCEDURE — 11103 TANGNTL BX SKIN EA SEP/ADDL: CPT | Mod: GC

## 2020-09-08 PROCEDURE — 99214 OFFICE O/P EST MOD 30 MIN: CPT | Mod: 25,GC

## 2020-09-22 ENCOUNTER — APPOINTMENT (OUTPATIENT)
Dept: DERMATOLOGY | Facility: CLINIC | Age: 85
End: 2020-09-22
Payer: MEDICARE

## 2020-09-22 VITALS — TEMPERATURE: 96.6 F

## 2020-09-22 DIAGNOSIS — D09.9 CARCINOMA IN SITU, UNSPECIFIED: ICD-10-CM

## 2020-09-22 PROCEDURE — 17003 DESTRUCT PREMALG LES 2-14: CPT | Mod: 59,GC

## 2020-09-22 PROCEDURE — 17262 DSTRJ MAL LES T/A/L 1.1-2.0: CPT | Mod: GC

## 2020-09-22 PROCEDURE — 17000 DESTRUCT PREMALG LESION: CPT | Mod: GC,59

## 2020-09-22 PROCEDURE — 99213 OFFICE O/P EST LOW 20 MIN: CPT | Mod: 25,GC

## 2020-09-29 ENCOUNTER — RX RENEWAL (OUTPATIENT)
Age: 85
End: 2020-09-29

## 2020-10-01 ENCOUNTER — APPOINTMENT (OUTPATIENT)
Dept: OPHTHALMOLOGY | Facility: CLINIC | Age: 85
End: 2020-10-01

## 2020-10-01 ENCOUNTER — NON-APPOINTMENT (OUTPATIENT)
Age: 85
End: 2020-10-01

## 2020-10-01 ENCOUNTER — APPOINTMENT (OUTPATIENT)
Dept: OPHTHALMOLOGY | Facility: CLINIC | Age: 85
End: 2020-10-01
Payer: MEDICARE

## 2020-10-01 PROCEDURE — 68761 CLOSE TEAR DUCT OPENING: CPT

## 2020-10-01 PROCEDURE — 92012 INTRM OPH EXAM EST PATIENT: CPT | Mod: 25

## 2020-10-29 ENCOUNTER — APPOINTMENT (OUTPATIENT)
Dept: GERIATRICS | Facility: CLINIC | Age: 85
End: 2020-10-29
Payer: MEDICARE

## 2020-10-29 VITALS
DIASTOLIC BLOOD PRESSURE: 78 MMHG | TEMPERATURE: 96.4 F | HEART RATE: 78 BPM | RESPIRATION RATE: 14 BRPM | SYSTOLIC BLOOD PRESSURE: 130 MMHG | OXYGEN SATURATION: 96 % | WEIGHT: 137 LBS | BODY MASS INDEX: 25.86 KG/M2 | HEIGHT: 61 IN

## 2020-10-29 DIAGNOSIS — H25.13 AGE-RELATED NUCLEAR CATARACT, BILATERAL: ICD-10-CM

## 2020-10-29 PROCEDURE — 99072 ADDL SUPL MATRL&STAF TM PHE: CPT

## 2020-10-29 PROCEDURE — 99214 OFFICE O/P EST MOD 30 MIN: CPT

## 2020-10-29 RX ORDER — DICYCLOMINE HYDROCHLORIDE 10 MG/1
10 CAPSULE ORAL
Qty: 90 | Refills: 1 | Status: ACTIVE | COMMUNITY
Start: 2020-10-29 | End: 1900-01-01

## 2020-10-29 RX ORDER — POLYETHYLENE GLYCOL 400 AND PROPYLENE GLYCOL 4; 3 MG/ML; MG/ML
0.4-0.3 SOLUTION/ DROPS OPHTHALMIC
Qty: 1 | Refills: 1 | Status: ACTIVE | COMMUNITY
Start: 2020-10-29 | End: 1900-01-01

## 2020-10-29 NOTE — ASSESSMENT
[FreeTextEntry1] : ibs- with occasional cramping; has been taking bentyl for years.  asking for refill.  discussed risks assocaited with medication and PIM for geriatrics.  family is aware and will be cautious with use.  refill provided as it helps patients symptoms and she rarely takes.\par htn: controlled c/w current medications\par OP: with multiple fragility fractures, cw fosamax (started 2019, hx of taking prior for <1 yr), c/w calcium and Vit D \par afib:controlled. remains on amio, follows with cardiology\par c/w walker use for unsteady gait\par hypothyroidism: stable c/w levo

## 2020-10-29 NOTE — REVIEW OF SYSTEMS
[Lower Ext Edema] : no lower extremity edema [Shortness Of Breath] : no shortness of breath [Cough] : no cough [As Noted in HPI] : as noted in HPI [Negative] : Musculoskeletal

## 2020-10-29 NOTE — PHYSICAL EXAM
[General Appearance - Alert] : alert [General Appearance - In No Acute Distress] : in no acute distress [Sclera] : the sclera and conjunctiva were normal [Extraocular Movements] : extraocular movements were intact [Outer Ear] : the ears and nose were normal in appearance [Neck Appearance] : the appearance of the neck was normal [] : no respiratory distress [Respiration, Rhythm And Depth] : normal respiratory rhythm and effort [Exaggerated Use Of Accessory Muscles For Inspiration] : no accessory muscle use [Auscultation Breath Sounds / Voice Sounds] : lungs were clear to auscultation bilaterally [Heart Rate And Rhythm] : heart rate was normal and rhythm regular [Heart Sounds] : normal S1 and S2 [Edema] : there was no peripheral edema [Bowel Sounds] : normal bowel sounds [Abdomen Soft] : soft [Abdomen Tenderness] : non-tender [Nail Clubbing] : no clubbing  or cyanosis of the fingernails [Involuntary Movements] : no involuntary movements were seen [FreeTextEntry1] : walker [No Focal Deficits] : no focal deficits [Impaired Insight] : insight and judgment were intact [Affect] : the affect was normal [Mood] : the mood was normal

## 2020-10-29 NOTE — HISTORY OF PRESENT ILLNESS
[FreeTextEntry1] : 89 yr old F with PMH of TIA, Htn, osteoporosis with multiple fragility fractures, hypothyroidism, presents for follow up. with her dtr.\par \par reports cramping and taking bentyl, asking for a refill.  no longer taking omeprazole.  reports most of the time she feels fine.\par +constipation,  did not tolerate miralax in past. prunes\par eating well\par taking fosamax and tolerating well,  \par has been following with Derm for SCC.\par \par blood pressure well controlled today. reprots adherent with medications

## 2020-11-20 ENCOUNTER — RX RENEWAL (OUTPATIENT)
Age: 85
End: 2020-11-20

## 2020-12-16 ENCOUNTER — LABORATORY RESULT (OUTPATIENT)
Age: 85
End: 2020-12-16

## 2020-12-16 ENCOUNTER — APPOINTMENT (OUTPATIENT)
Dept: DERMATOLOGY | Facility: CLINIC | Age: 85
End: 2020-12-16
Payer: MEDICARE

## 2020-12-16 DIAGNOSIS — Z85.828 PERSONAL HISTORY OF OTHER MALIGNANT NEOPLASM OF SKIN: ICD-10-CM

## 2020-12-16 DIAGNOSIS — L82.1 OTHER SEBORRHEIC KERATOSIS: ICD-10-CM

## 2020-12-16 DIAGNOSIS — D48.5 NEOPLASM OF UNCERTAIN BEHAVIOR OF SKIN: ICD-10-CM

## 2020-12-16 PROCEDURE — 99072 ADDL SUPL MATRL&STAF TM PHE: CPT

## 2020-12-16 PROCEDURE — 11103 TANGNTL BX SKIN EA SEP/ADDL: CPT

## 2020-12-16 PROCEDURE — 17004 DESTROY PREMAL LESIONS 15/>: CPT | Mod: 59

## 2020-12-16 PROCEDURE — 99214 OFFICE O/P EST MOD 30 MIN: CPT | Mod: 25

## 2020-12-16 PROCEDURE — 11102 TANGNTL BX SKIN SINGLE LES: CPT | Mod: 59

## 2020-12-17 PROBLEM — Z85.828 HISTORY OF NONMELANOMA SKIN CANCER: Status: ACTIVE | Noted: 2020-12-17

## 2020-12-17 PROBLEM — D48.5 NEOPLASM OF UNCERTAIN BEHAVIOR OF SKIN OF LOWER LEG: Status: ACTIVE | Noted: 2020-09-08

## 2020-12-17 PROBLEM — L82.1 SEBORRHEIC KERATOSIS: Status: ACTIVE | Noted: 2020-12-17

## 2020-12-17 NOTE — HISTORY OF PRESENT ILLNESS
[FreeTextEntry1] : f/u: spots, actinic keratoses [de-identified] :  Ms. PHYLLIS BASSEN is a 89 year F s/p ED&C for SCC, at least in situ, on right distal leg 7/13, h/o SCC on right popliteal fossa s/p Mohs 4/28/2020 and AFX of the left ear s/p Mohs in Oct 2019, presents for f/u as below:\par \par 1) Has a new spot on her right nose present for a few weeks at least which is bleeding and has not been treated.\par 2) Several rough bumps on face- treated with cryotherapy in the past, some have resolved and some are still present. Patient thinks that some new ones have appeared as well. Asx.\par 3) Suture in right popliteal fossa- Pt with suture remaining in right popliteal fossa following Mohs procedure done in 04/2020, after discussion with providers decided to avoid re-excising and continue to monitor and snip away sections as they become apparent. Asx.

## 2020-12-17 NOTE — ASSESSMENT
[FreeTextEntry1] : 1) Seborrheic keratoses\par - Counseled about clinically benign lesions\par - No treatment indicated at this time \par \par 2) Hx of multiple NMSC\par - Hx of multiple NMSC, No evidence of recurrence at previous biopsy sites\par \par 3) Actinic keratoses\par - We have discussed nature and course\par - We have discussed treatment options and appropriate expectations of treatment\par - The risks/benefits/alternatives of cryodestruction was explained to the patient which, include but are not limited to redness, swelling, pain, blistering, scar, discoloration of skin, and recurrence. The patient expressed understanding of these risks and agreed to the procedure. 20 lesions treated with 2 cycles of LN2. The procedure was well tolerated, without complication. We have discussed related skin care.\par - Never received 5-FU cream as it was not covered by insurance, not interested in alternatives\par \par 4) Retained suture\par - Continuing to monitor\par - Unable to clip today due to short size\par \par 5) Neoplasm of uncertain behavior of skin X 3\par - Right nasal ala, favor NMSC\par - left cheek, favor NMSC\par - right leg, SCC vs. hypertrophic AK\par - All biopsies performed as there were multiple lesions concerning for skin cancer\par The risks/benefits/alternatives of skin biopsy were explained to the patient which include but are not limited to scar, bleeding, infection, and recurrence. The area was prepped with rubbing alcohol, lidocaine was injected for anesthesia and biopsy was performed. The patient tolerated the procedure well. \par \par 6) Skin cancer screening\par - No lesions clinically concerning for malignancy today\par - Discussed regular self skin checks and ABCDEs of skin cancer screening\par - Discussed regular sunscreen use\par - Pt instructed to report any new or changing lesions\par \par RTC 1 yr for FBSE or sooner if any concerns \par \par RTC 6-8 weeks\par \par

## 2020-12-22 ENCOUNTER — NON-APPOINTMENT (OUTPATIENT)
Age: 85
End: 2020-12-22

## 2021-01-01 ENCOUNTER — RX RENEWAL (OUTPATIENT)
Age: 86
End: 2021-01-01

## 2021-01-01 ENCOUNTER — LABORATORY RESULT (OUTPATIENT)
Age: 86
End: 2021-01-01

## 2021-01-01 ENCOUNTER — NON-APPOINTMENT (OUTPATIENT)
Age: 86
End: 2021-01-01

## 2021-01-01 ENCOUNTER — APPOINTMENT (OUTPATIENT)
Dept: DERMATOLOGY | Facility: CLINIC | Age: 86
End: 2021-01-01
Payer: MEDICARE

## 2021-01-01 ENCOUNTER — APPOINTMENT (OUTPATIENT)
Dept: RADIATION ONCOLOGY | Facility: CLINIC | Age: 86
End: 2021-01-01

## 2021-01-01 DIAGNOSIS — D22.9 MELANOCYTIC NEVI, UNSPECIFIED: ICD-10-CM

## 2021-01-01 DIAGNOSIS — C44.629 SQUAMOUS CELL CARCINOMA OF SKIN OF LEFT UPPER LIMB, INCLUDING SHOULDER: ICD-10-CM
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
## 2021-01-01 DIAGNOSIS — Z12.83 ENCOUNTER FOR SCREENING FOR MALIGNANT NEOPLASM OF SKIN: ICD-10-CM
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
## 2021-01-01 DIAGNOSIS — D04.4 CARCINOMA IN SITU OF SKIN OF SCALP AND NECK: ICD-10-CM

## 2021-01-01 PROCEDURE — 17273 DSTR MAL LES S/N/H/F/G 2.1-3: CPT

## 2021-01-01 PROCEDURE — 99213 OFFICE O/P EST LOW 20 MIN: CPT | Mod: 25,GC

## 2021-01-01 PROCEDURE — 11603 EXC TR-EXT MAL+MARG 2.1-3 CM: CPT | Mod: 59

## 2021-01-01 PROCEDURE — 12032 INTMD RPR S/A/T/EXT 2.6-7.5: CPT | Mod: 59

## 2021-01-01 PROCEDURE — 17004 DESTROY PREMAL LESIONS 15/>: CPT | Mod: GC

## 2021-01-05 ENCOUNTER — NON-APPOINTMENT (OUTPATIENT)
Age: 86
End: 2021-01-05

## 2021-01-05 ENCOUNTER — APPOINTMENT (OUTPATIENT)
Dept: OPHTHALMOLOGY | Facility: CLINIC | Age: 86
End: 2021-01-05
Payer: MEDICARE

## 2021-01-05 PROCEDURE — 92012 INTRM OPH EXAM EST PATIENT: CPT | Mod: 25

## 2021-01-05 PROCEDURE — 68761 CLOSE TEAR DUCT OPENING: CPT | Mod: E4,E2

## 2021-01-05 PROCEDURE — 99072 ADDL SUPL MATRL&STAF TM PHE: CPT

## 2021-01-05 PROCEDURE — 92134 CPTRZ OPH DX IMG PST SGM RTA: CPT

## 2021-01-19 ENCOUNTER — RX RENEWAL (OUTPATIENT)
Age: 86
End: 2021-01-19

## 2021-01-21 ENCOUNTER — NON-APPOINTMENT (OUTPATIENT)
Age: 86
End: 2021-01-21

## 2021-01-22 DIAGNOSIS — Z01.818 ENCOUNTER FOR OTHER PREPROCEDURAL EXAMINATION: ICD-10-CM

## 2021-01-23 ENCOUNTER — APPOINTMENT (OUTPATIENT)
Dept: DISASTER EMERGENCY | Facility: CLINIC | Age: 86
End: 2021-01-23

## 2021-01-26 ENCOUNTER — APPOINTMENT (OUTPATIENT)
Dept: DERMATOLOGY | Facility: CLINIC | Age: 86
End: 2021-01-26
Payer: MEDICARE

## 2021-01-26 LAB — SARS-COV-2 N GENE NPH QL NAA+PROBE: NOT DETECTED

## 2021-01-26 PROCEDURE — 99215 OFFICE O/P EST HI 40 MIN: CPT

## 2021-01-26 PROCEDURE — 99072 ADDL SUPL MATRL&STAF TM PHE: CPT

## 2021-01-27 RX ORDER — FLUOROURACIL 50 MG/G
5 CREAM TOPICAL
Qty: 2 | Refills: 2 | Status: ACTIVE | COMMUNITY
Start: 2020-09-08 | End: 1900-01-01

## 2021-02-08 ENCOUNTER — OUTPATIENT (OUTPATIENT)
Dept: OUTPATIENT SERVICES | Facility: HOSPITAL | Age: 86
LOS: 1 days | Discharge: HOME | End: 2021-02-08
Payer: MEDICARE

## 2021-02-08 DIAGNOSIS — Z98.890 OTHER SPECIFIED POSTPROCEDURAL STATES: Chronic | ICD-10-CM

## 2021-02-09 ENCOUNTER — APPOINTMENT (OUTPATIENT)
Dept: RADIATION ONCOLOGY | Facility: CLINIC | Age: 86
End: 2021-02-09
Payer: MEDICARE

## 2021-02-09 PROCEDURE — 99205 OFFICE O/P NEW HI 60 MIN: CPT | Mod: GC,95

## 2021-02-09 RX ORDER — CYCLOSPORINE 0.5 MG/ML
0.05 EMULSION OPHTHALMIC
Qty: 90 | Refills: 4 | Status: DISCONTINUED | COMMUNITY
Start: 2019-05-03 | End: 2021-02-09

## 2021-02-09 RX ORDER — PREDNISOLONE ACETATE 10 MG/ML
1 SUSPENSION/ DROPS OPHTHALMIC 4 TIMES DAILY
Qty: 1 | Refills: 2 | Status: DISCONTINUED | COMMUNITY
Start: 2019-02-11 | End: 2021-02-09

## 2021-02-09 RX ORDER — OFLOXACIN 3 MG/ML
0.3 SOLUTION/ DROPS OPHTHALMIC 4 TIMES DAILY
Qty: 1 | Refills: 1 | Status: DISCONTINUED | COMMUNITY
Start: 2019-02-12 | End: 2021-02-09

## 2021-02-09 RX ORDER — CYCLOSPORINE 0.5 MG/ML
0.05 EMULSION OPHTHALMIC TWICE DAILY
Qty: 1 | Refills: 3 | Status: DISCONTINUED | COMMUNITY
Start: 2019-05-08 | End: 2021-02-09

## 2021-02-09 RX ORDER — OFLOXACIN 3 MG/ML
0.3 SOLUTION/ DROPS OPHTHALMIC 4 TIMES DAILY
Qty: 1 | Refills: 2 | Status: DISCONTINUED | COMMUNITY
Start: 2019-03-28 | End: 2021-02-09

## 2021-02-09 RX ORDER — MOXIFLOXACIN OPHTHALMIC 5 MG/ML
0.5 SOLUTION/ DROPS OPHTHALMIC 4 TIMES DAILY
Qty: 1 | Refills: 2 | Status: DISCONTINUED | COMMUNITY
Start: 2019-02-11 | End: 2021-02-09

## 2021-02-09 RX ORDER — PREDNISOLONE ACETATE 10 MG/ML
1 SUSPENSION/ DROPS OPHTHALMIC 4 TIMES DAILY
Qty: 1 | Refills: 2 | Status: DISCONTINUED | COMMUNITY
Start: 2019-03-28 | End: 2021-02-09

## 2021-02-10 NOTE — REVIEW OF SYSTEMS
"""Follow dry ARMD without treatment. MVI/AREDS/Amsler. Patient to call if vision changes or distortion increases. Good diet/do not smoke. """ [Dry Eyes] : dryness of the eyes [Loss of Hearing] : loss of hearing [Gait Disturbance] : gait disturbance [Negative] : Allergic/Immunologic [Eye Pain] : no eye pain [Red Eyes] : eyes not red [Visual Disturbances] : no visual disturbances [Dysphagia] : no dysphagia [Nosebleeds] : no nosebleeds [Hearing Disturbances] : no hearing disturbances [Joint Pain] : no joint pain [Muscle Pain] : no muscle pain [Muscle Weakness] : no muscle weakness [FreeTextEntry9] : Uses walker

## 2021-02-10 NOTE — PHYSICAL EXAM
[Normal] : well developed, well nourished, in no acute distress [de-identified] : Telehealth, nasal region seen with tiny hyperpigmention scab healing since biopsy.

## 2021-02-10 NOTE — HISTORY OF PRESENT ILLNESS
[Home] : at home, [unfilled] , at the time of the visit. [Medical Office: ([facility])___] : at the medical office located in  [Family Member] : family member [Verbal consent obtained from patient] : the patient, [unfilled] [FreeTextEntry1] : Ms. PHYLLIS BASSEN is a 89 year F with newly diagnosed basal cell carcinoma of the right nasal ala. Patient presents to discuss radiation therapy.\par \par Brief Oncological History:\par 12/26/20 - Biopsies:\par 1. Right nasal ala, biopsy:\par - Basal cell carcinoma with focal sclerosing features, extending to base of biopsy.\par 2. 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 She would like to do the most minimally invasive treatment as possible. She has had Mohs surgery on her nose elsewhere which was reconstructed by a plastic surgeon. She reports that her nostril was asymmetric as a result, but this was later corrected.  Hx of SCC on right popliteal fossa s/p Mohs 4/28/2020 and AFX of the left ear s/p Mohs in Oct 2019.\par \par Patient today here via telehealth. She has no complaints. Notes saw lesion for almost a year, recently biopsied, was offered MOhs vs. ENT surgery. Patient prefers least invasive option. Initial lesion was size of rice grain.

## 2021-02-11 PROCEDURE — 77263 THER RADIOLOGY TX PLNG CPLX: CPT

## 2021-02-11 PROCEDURE — 99072 ADDL SUPL MATRL&STAF TM PHE: CPT

## 2021-03-03 ENCOUNTER — APPOINTMENT (OUTPATIENT)
Dept: RADIATION ONCOLOGY | Facility: CLINIC | Age: 86
End: 2021-03-03
Payer: MEDICARE

## 2021-03-03 VITALS
OXYGEN SATURATION: 98 % | RESPIRATION RATE: 16 BRPM | HEART RATE: 55 BPM | SYSTOLIC BLOOD PRESSURE: 161 MMHG | DIASTOLIC BLOOD PRESSURE: 86 MMHG

## 2021-03-03 PROCEDURE — 77334 RADIATION TREATMENT AID(S): CPT | Mod: 26

## 2021-03-03 PROCEDURE — 99213 OFFICE O/P EST LOW 20 MIN: CPT | Mod: 25,GC

## 2021-03-03 PROCEDURE — 77290 THER RAD SIMULAJ FIELD CPLX: CPT | Mod: 26

## 2021-03-03 PROCEDURE — 99072 ADDL SUPL MATRL&STAF TM PHE: CPT

## 2021-03-04 NOTE — HISTORY OF PRESENT ILLNESS
[FreeTextEntry1] : Ms. PHYLLIS BASSEN is a 89 year F with newly diagnosed basal cell carcinoma of the right nasal ala. Patient returns for consent signinga and CT Simulation for treatment planning.\par \par Brief Oncological History:\par 12/26/20 - Biopsies:\par 1. Right nasal ala, biopsy:\par - Basal cell carcinoma with focal sclerosing features, extending to base of biopsy.\par 2. 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 She would like to do the most minimally invasive treatment as possible. She has had Mohs surgery on her nose elsewhere which was reconstructed by a plastic surgeon. She reports that her nostril was asymmetric as a result, but this was later corrected.  Hx of SCC on right popliteal fossa s/p Mohs 4/28/2020 and AFX of the left ear s/p Mohs in Oct 2019.\par \par Here for CT Simulation for brachytherapy. She has no complaints.

## 2021-03-04 NOTE — PHYSICAL EXAM
[Sclera] : the sclera and conjunctiva were normal [] : no respiratory distress [Heart Rate And Rhythm] : heart rate and rhythm were normal [Normal] : no palpable adenopathy [Cervical Lymph Nodes Enlarged Posterior Bilaterally] : posterior cervical [Cervical Lymph Nodes Enlarged Anterior Bilaterally] : anterior cervical [Supraclavicular Lymph Nodes Enlarged Bilaterally] : supraclavicular [Skin Color & Pigmentation] : normal skin color and pigmentation [No Focal Deficits] : no focal deficits [Oriented To Time, Place, And Person] : oriented to person, place, and time [de-identified] : no visible large lessions, previous site of biopsy and right nasal ala dry

## 2021-03-04 NOTE — REVIEW OF SYSTEMS
[Dry Eyes] : dryness of the eyes [Loss of Hearing] : loss of hearing [Gait Disturbance] : gait disturbance [Negative] : Allergic/Immunologic [Eye Pain] : no eye pain [Red Eyes] : eyes not red [Visual Disturbances] : no visual disturbances [Dysphagia] : no dysphagia [Nosebleeds] : no nosebleeds [Hearing Disturbances] : no hearing disturbances [Joint Pain] : no joint pain [Muscle Pain] : no muscle pain [Muscle Weakness] : no muscle weakness [FreeTextEntry9] : Uses walker

## 2021-03-22 PROCEDURE — 77295 3-D RADIOTHERAPY PLAN: CPT | Mod: 26

## 2021-03-24 ENCOUNTER — APPOINTMENT (OUTPATIENT)
Dept: DERMATOLOGY | Facility: CLINIC | Age: 86
End: 2021-03-24
Payer: MEDICARE

## 2021-03-24 DIAGNOSIS — Z18.9 OTHER COMPLICATIONS OF PROCEDURES, NOT ELSEWHERE CLASSIFIED, INITIAL ENCOUNTER: ICD-10-CM

## 2021-03-24 DIAGNOSIS — T81.89XA OTHER COMPLICATIONS OF PROCEDURES, NOT ELSEWHERE CLASSIFIED, INITIAL ENCOUNTER: ICD-10-CM

## 2021-03-24 PROCEDURE — 99213 OFFICE O/P EST LOW 20 MIN: CPT | Mod: 25

## 2021-03-24 PROCEDURE — 17000 DESTRUCT PREMALG LESION: CPT

## 2021-03-24 PROCEDURE — 99072 ADDL SUPL MATRL&STAF TM PHE: CPT

## 2021-03-24 PROCEDURE — 17003 DESTRUCT PREMALG LES 2-14: CPT

## 2021-03-25 PROBLEM — T81.89XA RETAINED SUTURE: Status: ACTIVE | Noted: 2020-09-22

## 2021-04-06 PROCEDURE — 77768 HDR RDNCL SKN SURF BRACHYTX: CPT | Mod: 26

## 2021-04-08 ENCOUNTER — APPOINTMENT (OUTPATIENT)
Dept: GERIATRICS | Facility: CLINIC | Age: 86
End: 2021-04-08

## 2021-04-12 PROCEDURE — 77768 HDR RDNCL SKN SURF BRACHYTX: CPT | Mod: 26

## 2021-04-15 PROCEDURE — 77768 HDR RDNCL SKN SURF BRACHYTX: CPT | Mod: 26

## 2021-04-19 PROCEDURE — 77768 HDR RDNCL SKN SURF BRACHYTX: CPT | Mod: 26

## 2021-04-22 PROCEDURE — 77768 HDR RDNCL SKN SURF BRACHYTX: CPT | Mod: 26

## 2021-04-26 PROCEDURE — 77768 HDR RDNCL SKN SURF BRACHYTX: CPT | Mod: 26

## 2021-04-29 PROCEDURE — 77768 HDR RDNCL SKN SURF BRACHYTX: CPT | Mod: 26

## 2021-04-30 ENCOUNTER — APPOINTMENT (OUTPATIENT)
Dept: GERIATRICS | Facility: CLINIC | Age: 86
End: 2021-04-30
Payer: MEDICARE

## 2021-04-30 VITALS
SYSTOLIC BLOOD PRESSURE: 130 MMHG | TEMPERATURE: 94.4 F | HEIGHT: 61 IN | DIASTOLIC BLOOD PRESSURE: 80 MMHG | RESPIRATION RATE: 14 BRPM | WEIGHT: 140 LBS | BODY MASS INDEX: 26.43 KG/M2 | OXYGEN SATURATION: 94 % | HEART RATE: 61 BPM

## 2021-04-30 DIAGNOSIS — E78.5 HYPERLIPIDEMIA, UNSPECIFIED: ICD-10-CM

## 2021-04-30 DIAGNOSIS — K21.9 GASTRO-ESOPHAGEAL REFLUX DISEASE W/OUT ESOPHAGITIS: ICD-10-CM

## 2021-04-30 DIAGNOSIS — C44.311 BASAL CELL CARCINOMA OF SKIN OF NOSE: ICD-10-CM

## 2021-04-30 DIAGNOSIS — F41.9 ANXIETY DISORDER, UNSPECIFIED: ICD-10-CM

## 2021-04-30 PROCEDURE — 99214 OFFICE O/P EST MOD 30 MIN: CPT

## 2021-04-30 PROCEDURE — 99072 ADDL SUPL MATRL&STAF TM PHE: CPT

## 2021-04-30 NOTE — PHYSICAL EXAM
[General Appearance - Alert] : alert [General Appearance - In No Acute Distress] : in no acute distress [Sclera] : the sclera and conjunctiva were normal [Extraocular Movements] : extraocular movements were intact [Outer Ear] : the ears and nose were normal in appearance [Neck Appearance] : the appearance of the neck was normal [] : no respiratory distress [Respiration, Rhythm And Depth] : normal respiratory rhythm and effort [Exaggerated Use Of Accessory Muscles For Inspiration] : no accessory muscle use [Auscultation Breath Sounds / Voice Sounds] : lungs were clear to auscultation bilaterally [Heart Rate And Rhythm] : heart rate was normal and rhythm regular [Heart Sounds] : normal S1 and S2 [Edema] : there was no peripheral edema [Bowel Sounds] : normal bowel sounds [Abdomen Soft] : soft [Abdomen Tenderness] : non-tender [Nail Clubbing] : no clubbing  or cyanosis of the fingernails [Involuntary Movements] : no involuntary movements were seen [No Focal Deficits] : no focal deficits [Impaired Insight] : insight and judgment were intact [Affect] : the affect was normal [Mood] : the mood was normal [FreeTextEntry1] : walker

## 2021-04-30 NOTE — HISTORY OF PRESENT ILLNESS
[No falls in past year] : Patient reported no falls in the past year [Walker] : walker [Designated Healthcare Proxy] : Designated healthcare proxy [Name: ___] : Health Care Proxy's Name: [unfilled]  [DNR] : DNR [DNI] : DNI [FreeTextEntry1] : 89 yr old F with PMH of TIA, Htn, osteoporosis with multiple fragility fractures, hypothyroidism, presents for follow up. with her dtr and aide\par \par has HHA\par ADL's\par IADL's\par \par \par ibs- notes worse diarrhea with stress/anxiety.  takes prn imodium.  then developed constipation, but had BM Wed. and continues with prunes, colace and senna.\par \par eating well\par \par has been following with Derm for BCC\par \par blood pressure well controlled today. reports adherent with medications \par no changes in medications\par \par denies recent falls\par \par OP:\par started fosamax 11/2019 and tolerating well,\par Dexa due 2021\par   \par hx of multiple fragility fractures:\par pelvic fracture after fall 6/2019\par sept 2014 : pelvic fracture\par oct 2014 : coccyx fracture, L1 compression fracture -  s/p kyphoplasty\par may 2015 : c2 fracture\par 2015 fell and was hospitalized at [facility], where all her w/u negaive, also had CT angio neck done which ruled out any injury to blood vessels from c2 fracture and she was discharged home.\par oct 2015 right proximal humerus fracture\par July 2018: L3 VCF on MRI\par \par \par hx of L cataract surgery\par Macular degeneration\par \par Chronic gait instability:\par walks with walker\par \par Afib / HTN / HLD\par - On Norvasc 5mg daily, Lisinopril 40mg daily, and Propranolol 40mg BID \par - On amiodarone\par - On Llipitor 20\par -not on AC due to falls and increase bleeding risk\par \par multiple skin cancers:\par BCC of nose, undergoing XRT as she opted against surgery\par denies bleeding or pain.\par \par hx of CVA\par hemorrhagic\par symptoms were loss of peripheral vision, which resolved\par \par hx of anxiety: was taking benzodiazepines. remains off medications and is stable [FreeTextEntry4] : KESHA on file: DNR/DNI, limited med interventions, No feeding tube, Trial period of IVF \par

## 2021-04-30 NOTE — REVIEW OF SYSTEMS
[As Noted in HPI] : as noted in HPI [Negative] : Musculoskeletal [Lower Ext Edema] : no lower extremity edema [Shortness Of Breath] : no shortness of breath [Cough] : no cough

## 2021-04-30 NOTE — ASSESSMENT
[FreeTextEntry1] : ibs- fluctuates between diarrhea and constipation; has been taking bentyl for years, but rarely (2x montly).  discussed risks associated with medication and PIM for geriatrics.  family is aware and will be cautious with use. continue with senna, colace, and may use prn magnesium \par htn: controlled c/w current medications\par OP: with multiple fragility fractures, cw fosamax (started 2019, hx of taking prior for <1 yr), c/w calcium and Vit D \par afib:controlled. remains on amio, follows with cardiology\par c/w walker use for unsteady gait\par hypothyroidism: stable c/w levo\par basal cell carcinoma of nose: currently receiving XRT to nose.\par \par check home draw labs

## 2021-05-04 PROCEDURE — 77768 HDR RDNCL SKN SURF BRACHYTX: CPT | Mod: 26

## 2021-05-06 ENCOUNTER — APPOINTMENT (OUTPATIENT)
Dept: OPHTHALMOLOGY | Facility: CLINIC | Age: 86
End: 2021-05-06
Payer: MEDICARE

## 2021-05-06 ENCOUNTER — NON-APPOINTMENT (OUTPATIENT)
Age: 86
End: 2021-05-06

## 2021-05-06 LAB
25(OH)D3 SERPL-MCNC: 29.4 NG/ML
ALBUMIN SERPL ELPH-MCNC: 3.8 G/DL
ALP BLD-CCNC: 54 U/L
ALT SERPL-CCNC: 14 U/L
ANION GAP SERPL CALC-SCNC: 12 MMOL/L
AST SERPL-CCNC: 20 U/L
BASOPHILS # BLD AUTO: 0.03 K/UL
BASOPHILS NFR BLD AUTO: 0.6 %
BILIRUB SERPL-MCNC: 0.2 MG/DL
BUN SERPL-MCNC: 18 MG/DL
CALCIUM SERPL-MCNC: 9.1 MG/DL
CHLORIDE SERPL-SCNC: 106 MMOL/L
CO2 SERPL-SCNC: 26 MMOL/L
CREAT SERPL-MCNC: 1.08 MG/DL
EOSINOPHIL # BLD AUTO: 0.06 K/UL
EOSINOPHIL NFR BLD AUTO: 1.1 %
GLUCOSE SERPL-MCNC: 70 MG/DL
HCT VFR BLD CALC: 37.6 %
HGB BLD-MCNC: 11.5 G/DL
IMM GRANULOCYTES NFR BLD AUTO: 0.6 %
LYMPHOCYTES # BLD AUTO: 1.67 K/UL
LYMPHOCYTES NFR BLD AUTO: 30.7 %
MAN DIFF?: NORMAL
MCHC RBC-ENTMCNC: 29.7 PG
MCHC RBC-ENTMCNC: 30.6 GM/DL
MCV RBC AUTO: 97.2 FL
MONOCYTES # BLD AUTO: 0.58 K/UL
MONOCYTES NFR BLD AUTO: 10.7 %
NEUTROPHILS # BLD AUTO: 3.07 K/UL
NEUTROPHILS NFR BLD AUTO: 56.3 %
PLATELET # BLD AUTO: 206 K/UL
POTASSIUM SERPL-SCNC: 3.8 MMOL/L
PROT SERPL-MCNC: 6.6 G/DL
RBC # BLD: 3.87 M/UL
RBC # FLD: 14 %
SODIUM SERPL-SCNC: 144 MMOL/L
TSH SERPL-ACNC: 2.49 UIU/ML
WBC # FLD AUTO: 5.44 K/UL

## 2021-05-06 PROCEDURE — 92012 INTRM OPH EXAM EST PATIENT: CPT | Mod: 25

## 2021-05-06 PROCEDURE — 99072 ADDL SUPL MATRL&STAF TM PHE: CPT

## 2021-05-06 PROCEDURE — 68761 CLOSE TEAR DUCT OPENING: CPT | Mod: E1,E2,E3,E4

## 2021-05-14 ENCOUNTER — NON-APPOINTMENT (OUTPATIENT)
Age: 86
End: 2021-05-14

## 2021-05-24 ENCOUNTER — NON-APPOINTMENT (OUTPATIENT)
Age: 86
End: 2021-05-24

## 2021-06-01 ENCOUNTER — RX RENEWAL (OUTPATIENT)
Age: 86
End: 2021-06-01

## 2021-06-08 ENCOUNTER — NON-APPOINTMENT (OUTPATIENT)
Age: 86
End: 2021-06-08

## 2021-06-08 ENCOUNTER — APPOINTMENT (OUTPATIENT)
Dept: OPHTHALMOLOGY | Facility: CLINIC | Age: 86
End: 2021-06-08
Payer: MEDICARE

## 2021-06-08 PROCEDURE — 92012 INTRM OPH EXAM EST PATIENT: CPT | Mod: 25

## 2021-06-08 PROCEDURE — 67840 REMOVE EYELID LESION: CPT | Mod: RT

## 2021-06-08 PROCEDURE — 99072 ADDL SUPL MATRL&STAF TM PHE: CPT

## 2021-06-14 ENCOUNTER — APPOINTMENT (OUTPATIENT)
Dept: RADIATION ONCOLOGY | Facility: CLINIC | Age: 86
End: 2021-06-14
Payer: MEDICARE

## 2021-06-14 VITALS
SYSTOLIC BLOOD PRESSURE: 181 MMHG | HEIGHT: 61 IN | WEIGHT: 141.54 LBS | TEMPERATURE: 94.1 F | OXYGEN SATURATION: 95 % | DIASTOLIC BLOOD PRESSURE: 92 MMHG | BODY MASS INDEX: 26.72 KG/M2 | HEART RATE: 61 BPM | RESPIRATION RATE: 15 BRPM

## 2021-06-14 PROCEDURE — 99024 POSTOP FOLLOW-UP VISIT: CPT

## 2021-06-14 NOTE — VITALS
[Maximal Pain Intensity: 0/10] : 0/10 [Least Pain Intensity: 0/10] : 0/10 [ECOG Performance Status: 2 - Ambulatory and capable of all self care but unable to carry out any work activities] : Performance Status: 2 - Ambulatory and capable of all self care but unable to carry out any work activities. Up and about more than 50% of waking hours [70: Cares for self; unalbe to carry on normal activity or do active work.] : 70: Cares for self; unable to carry on normal activity or do active work.

## 2021-06-14 NOTE — HISTORY OF PRESENT ILLNESS
[FreeTextEntry1] : Ms. PHYLLIS BASSEN is a 89 year woman diagnosed with basal cell carcinoma of the right nasal ala.  Complete RT brachytherapy via HAM applicator to right nasal ala total dose 4000 cGy on 5/4/2021. \par \par Presents today for post treatment evaluation. Skin healed. No c/o nasal dryness.

## 2021-06-14 NOTE — REVIEW OF SYSTEMS
[Fatigue: Grade 0] : Fatigue: Grade 0 [Xerostomia: Grade 0] : Xerostomia: Grade 0 [Oral Pain: Grade 0] : Oral Pain: Grade 0 [Dysgeusia: Grade 0] : Dysgeusia: Grade 0 [Skin Hyperpigmentation: Grade 0] : Skin Hyperpigmentation: Grade 0 [Dermatitis Radiation: Grade 0] : Dermatitis Radiation: Grade 0

## 2021-06-14 NOTE — REASON FOR VISIT
[Head and Neck Cancer] : head and neck cancer [Family Member] : family member [Post-Treatment Evaluation] : post-treatment evaluation for [Formal Caregiver] : formal caregiver

## 2021-06-14 NOTE — PHYSICAL EXAM
[FreeTextEntry1] : Face, no skin nodule, no hyper or hypopigmentation of the skin. no cervical lymphadenopathy .

## 2021-06-29 ENCOUNTER — LABORATORY RESULT (OUTPATIENT)
Age: 86
End: 2021-06-29

## 2021-06-29 ENCOUNTER — APPOINTMENT (OUTPATIENT)
Dept: DERMATOLOGY | Facility: CLINIC | Age: 86
End: 2021-06-29
Payer: MEDICARE

## 2021-06-29 DIAGNOSIS — Z86.03 PERSONAL HISTORY OF NEOPLASM OF UNCERTAIN BEHAVIOR: ICD-10-CM

## 2021-06-29 DIAGNOSIS — D48.5 NEOPLASM OF UNCERTAIN BEHAVIOR OF SKIN: ICD-10-CM

## 2021-06-29 PROCEDURE — 11102 TANGNTL BX SKIN SINGLE LES: CPT | Mod: 59

## 2021-06-29 PROCEDURE — 99072 ADDL SUPL MATRL&STAF TM PHE: CPT

## 2021-06-29 PROCEDURE — 99213 OFFICE O/P EST LOW 20 MIN: CPT | Mod: 25

## 2021-06-29 PROCEDURE — 11103 TANGNTL BX SKIN EA SEP/ADDL: CPT

## 2021-06-29 PROCEDURE — 17004 DESTROY PREMAL LESIONS 15/>: CPT

## 2021-07-01 ENCOUNTER — NON-APPOINTMENT (OUTPATIENT)
Age: 86
End: 2021-07-01

## 2021-07-02 ENCOUNTER — NON-APPOINTMENT (OUTPATIENT)
Age: 86
End: 2021-07-02

## 2021-07-03 PROBLEM — D48.5 NEOPLASM OF UNCERTAIN BEHAVIOR OF SKIN OF UPPER EXTREMITY: Status: ACTIVE | Noted: 2021-07-03

## 2021-07-03 PROBLEM — Z86.03 HISTORY OF NEOPLASM OF UNCERTAIN BEHAVIOR OF SKIN OF FACE: Status: RESOLVED | Noted: 2020-12-17 | Resolved: 2021-07-03

## 2021-07-03 PROBLEM — D48.5 NEOPLASM OF UNCERTAIN BEHAVIOR OF SKIN OF FACE: Status: ACTIVE | Noted: 2021-07-03

## 2021-07-17 ENCOUNTER — APPOINTMENT (OUTPATIENT)
Dept: GASTROENTEROLOGY | Facility: CLINIC | Age: 86
End: 2021-07-17
Payer: MEDICARE

## 2021-07-17 VITALS
HEART RATE: 59 BPM | WEIGHT: 138 LBS | OXYGEN SATURATION: 96 % | BODY MASS INDEX: 26.06 KG/M2 | DIASTOLIC BLOOD PRESSURE: 109 MMHG | HEIGHT: 61 IN | SYSTOLIC BLOOD PRESSURE: 178 MMHG | TEMPERATURE: 97.3 F

## 2021-07-17 PROCEDURE — 99203 OFFICE O/P NEW LOW 30 MIN: CPT

## 2021-07-17 PROCEDURE — 99072 ADDL SUPL MATRL&STAF TM PHE: CPT

## 2021-07-17 NOTE — ASSESSMENT
[FreeTextEntry1] : Pt with change in BH. Patient does not want any diagnostic tests nor should she. \par \par \par PLAn\par Miralax qd\par 3 tabs citrucell bid\par Stop laxative and colace

## 2021-07-17 NOTE — HISTORY OF PRESENT ILLNESS
[de-identified] : 89 yo here with D[place]. Last colon at 79 yo and normal. Baseline is constipation , then had diarrhea with laxatives , now going daily. Taking Sennekot and colace  . Getting Radiation to nose. Otherwise no current issues.

## 2021-07-27 ENCOUNTER — APPOINTMENT (OUTPATIENT)
Dept: GERIATRICS | Facility: CLINIC | Age: 86
End: 2021-07-27
Payer: MEDICARE

## 2021-07-27 VITALS
WEIGHT: 138 LBS | DIASTOLIC BLOOD PRESSURE: 82 MMHG | RESPIRATION RATE: 16 BRPM | HEIGHT: 61 IN | HEART RATE: 52 BPM | OXYGEN SATURATION: 96 % | SYSTOLIC BLOOD PRESSURE: 142 MMHG | BODY MASS INDEX: 26.06 KG/M2 | TEMPERATURE: 97.5 F

## 2021-07-27 DIAGNOSIS — S22.42XA MULTIPLE FRACTURES OF RIBS, LEFT SIDE, INITIAL ENCOUNTER FOR CLOSED FRACTURE: ICD-10-CM

## 2021-07-27 PROCEDURE — 99072 ADDL SUPL MATRL&STAF TM PHE: CPT

## 2021-07-27 PROCEDURE — 99214 OFFICE O/P EST MOD 30 MIN: CPT | Mod: GC

## 2021-07-28 ENCOUNTER — NON-APPOINTMENT (OUTPATIENT)
Age: 86
End: 2021-07-28

## 2021-07-29 ENCOUNTER — TRANSCRIPTION ENCOUNTER (OUTPATIENT)
Age: 86
End: 2021-07-29

## 2021-07-29 NOTE — END OF VISIT
[] : Fellow [Time Spent: ___ minutes] : I have spent [unfilled] minutes of time on the encounter. [FreeTextEntry3] : 89 y/o woman presents for acute visit w/ constipation. Patient had evidence of fecal impacation. Manual disimpaction completed in office w/ Dr. Gray. Rx for dulcolax suppositories. Spoke with patient's daughter today and noted that she has had multiple large BM in the interim and feels much improved. Will c/w senna/miralax moving forward for constipation.

## 2021-07-29 NOTE — REASON FOR VISIT
[Acute] : an acute visit [Family Member] : family member [FreeTextEntry1] : pain control and  constipation

## 2021-07-29 NOTE — PHYSICAL EXAM
[General Appearance - Alert] : alert [General Appearance - Well Nourished] : well nourished [Jugular Venous Distention Increased] : there was no jugular-venous distention [] : no respiratory distress [Respiration, Rhythm And Depth] : normal respiratory rhythm and effort [Auscultation Breath Sounds / Voice Sounds] : lungs were clear to auscultation bilaterally [Heart Rate And Rhythm] : heart rate was normal and rhythm regular [Heart Sounds] : normal S1 and S2 [Abdomen Soft] : soft [Abdomen Tenderness] : non-tender [Normal Sphincter Tone] : normal sphincter tone [No Rectal Mass] : no rectal mass [External Hemorrhoid] : no external hemorrhoids [FreeTextEntry1] : feces hard  but  no  overt  signs  of  fecal  impaction

## 2021-07-29 NOTE — REVIEW OF SYSTEMS
[Abdominal Pain] : abdominal pain [Constipation] : constipation [Fever] : no fever [Chills] : no chills [Heart Rate Is Slow] : the heart rate was not slow [Heart Rate Is Fast] : the heart rate was not fast [Chest Pain] : no chest pain [Palpitations] : no palpitations [Shortness Of Breath] : no shortness of breath [Cough] : no cough [Vomiting] : no vomiting [Diarrhea] : no diarrhea [Melena] : no melena [Confused] : no confusion [Dizziness] : no dizziness [Fainting] : no fainting [Easy Bleeding] : no tendency for easy bleeding

## 2021-07-30 ENCOUNTER — NON-APPOINTMENT (OUTPATIENT)
Age: 86
End: 2021-07-30

## 2021-07-30 RX ORDER — TRAMADOL HYDROCHLORIDE 50 MG/1
50 TABLET, COATED ORAL
Qty: 20 | Refills: 0 | Status: DISCONTINUED | COMMUNITY
Start: 2021-07-29 | End: 2021-07-30

## 2021-08-03 ENCOUNTER — NON-APPOINTMENT (OUTPATIENT)
Age: 86
End: 2021-08-03

## 2021-08-04 ENCOUNTER — RX RENEWAL (OUTPATIENT)
Age: 86
End: 2021-08-04

## 2021-08-04 ENCOUNTER — APPOINTMENT (OUTPATIENT)
Dept: GERIATRICS | Facility: CLINIC | Age: 86
End: 2021-08-04
Payer: MEDICARE

## 2021-08-04 DIAGNOSIS — S22.39XA FRACTURE OF ONE RIB, UNSPECIFIED SIDE, INITIAL ENCOUNTER FOR CLOSED FRACTURE: ICD-10-CM

## 2021-08-04 DIAGNOSIS — E63.9 NUTRITIONAL DEFICIENCY, UNSPECIFIED: ICD-10-CM

## 2021-08-04 PROCEDURE — 99442: CPT

## 2021-08-05 PROBLEM — E63.9 NUTRITIONAL DEFICIENCY: Status: ACTIVE | Noted: 2021-08-05

## 2021-08-05 PROBLEM — S22.39XA RIB FRACTURE: Status: ACTIVE | Noted: 2021-07-29

## 2021-08-07 ENCOUNTER — INPATIENT (INPATIENT)
Facility: HOSPITAL | Age: 86
LOS: 4 days | Discharge: HOME | DRG: 551 | End: 2021-08-12
Attending: HOSPITALIST | Admitting: STUDENT IN AN ORGANIZED HEALTH CARE EDUCATION/TRAINING PROGRAM
Payer: MEDICARE

## 2021-08-07 ENCOUNTER — NON-APPOINTMENT (OUTPATIENT)
Age: 86
End: 2021-08-07

## 2021-08-07 VITALS
WEIGHT: 145.06 LBS | RESPIRATION RATE: 18 BRPM | TEMPERATURE: 98 F | HEIGHT: 62 IN | HEART RATE: 53 BPM | DIASTOLIC BLOOD PRESSURE: 79 MMHG | SYSTOLIC BLOOD PRESSURE: 181 MMHG | OXYGEN SATURATION: 98 %

## 2021-08-07 DIAGNOSIS — Z98.890 OTHER SPECIFIED POSTPROCEDURAL STATES: Chronic | ICD-10-CM

## 2021-08-07 LAB
ALBUMIN SERPL ELPH-MCNC: 3.6 G/DL — SIGNIFICANT CHANGE UP (ref 3.3–5)
ALP SERPL-CCNC: 80 U/L — SIGNIFICANT CHANGE UP (ref 40–120)
ALT FLD-CCNC: 17 U/L — SIGNIFICANT CHANGE UP (ref 10–45)
ANION GAP SERPL CALC-SCNC: 16 MMOL/L — SIGNIFICANT CHANGE UP (ref 5–17)
APPEARANCE UR: ABNORMAL
AST SERPL-CCNC: 30 U/L — SIGNIFICANT CHANGE UP (ref 10–40)
BASOPHILS # BLD AUTO: 0.03 K/UL — SIGNIFICANT CHANGE UP (ref 0–0.2)
BASOPHILS NFR BLD AUTO: 0.4 % — SIGNIFICANT CHANGE UP (ref 0–2)
BILIRUB SERPL-MCNC: 0.4 MG/DL — SIGNIFICANT CHANGE UP (ref 0.2–1.2)
BILIRUB UR-MCNC: NEGATIVE — SIGNIFICANT CHANGE UP
BUN SERPL-MCNC: 14 MG/DL — SIGNIFICANT CHANGE UP (ref 7–23)
CALCIUM SERPL-MCNC: 9.3 MG/DL — SIGNIFICANT CHANGE UP (ref 8.4–10.5)
CHLORIDE SERPL-SCNC: 99 MMOL/L — SIGNIFICANT CHANGE UP (ref 96–108)
CO2 SERPL-SCNC: 24 MMOL/L — SIGNIFICANT CHANGE UP (ref 22–31)
COLOR SPEC: YELLOW — SIGNIFICANT CHANGE UP
CREAT SERPL-MCNC: 1 MG/DL — SIGNIFICANT CHANGE UP (ref 0.5–1.3)
DIFF PNL FLD: NEGATIVE — SIGNIFICANT CHANGE UP
EOSINOPHIL # BLD AUTO: 0.05 K/UL — SIGNIFICANT CHANGE UP (ref 0–0.5)
EOSINOPHIL NFR BLD AUTO: 0.7 % — SIGNIFICANT CHANGE UP (ref 0–6)
GLUCOSE SERPL-MCNC: 104 MG/DL — HIGH (ref 70–99)
GLUCOSE UR QL: NEGATIVE — SIGNIFICANT CHANGE UP
HCT VFR BLD CALC: 40.4 % — SIGNIFICANT CHANGE UP (ref 34.5–45)
HGB BLD-MCNC: 13.5 G/DL — SIGNIFICANT CHANGE UP (ref 11.5–15.5)
IMM GRANULOCYTES NFR BLD AUTO: 0.8 % — SIGNIFICANT CHANGE UP (ref 0–1.5)
KETONES UR-MCNC: NEGATIVE — SIGNIFICANT CHANGE UP
LEUKOCYTE ESTERASE UR-ACNC: ABNORMAL
LYMPHOCYTES # BLD AUTO: 1.24 K/UL — SIGNIFICANT CHANGE UP (ref 1–3.3)
LYMPHOCYTES # BLD AUTO: 16.4 % — SIGNIFICANT CHANGE UP (ref 13–44)
MCHC RBC-ENTMCNC: 30.5 PG — SIGNIFICANT CHANGE UP (ref 27–34)
MCHC RBC-ENTMCNC: 33.4 GM/DL — SIGNIFICANT CHANGE UP (ref 32–36)
MCV RBC AUTO: 91.2 FL — SIGNIFICANT CHANGE UP (ref 80–100)
MONOCYTES # BLD AUTO: 1.12 K/UL — HIGH (ref 0–0.9)
MONOCYTES NFR BLD AUTO: 14.8 % — HIGH (ref 2–14)
NEUTROPHILS # BLD AUTO: 5.07 K/UL — SIGNIFICANT CHANGE UP (ref 1.8–7.4)
NEUTROPHILS NFR BLD AUTO: 66.9 % — SIGNIFICANT CHANGE UP (ref 43–77)
NITRITE UR-MCNC: NEGATIVE — SIGNIFICANT CHANGE UP
NRBC # BLD: 0 /100 WBCS — SIGNIFICANT CHANGE UP (ref 0–0)
PH UR: 6.5 — SIGNIFICANT CHANGE UP (ref 5–8)
PLATELET # BLD AUTO: 307 K/UL — SIGNIFICANT CHANGE UP (ref 150–400)
POTASSIUM SERPL-MCNC: 3.1 MMOL/L — LOW (ref 3.5–5.3)
POTASSIUM SERPL-SCNC: 3.1 MMOL/L — LOW (ref 3.5–5.3)
PROT SERPL-MCNC: 7.5 G/DL — SIGNIFICANT CHANGE UP (ref 6–8.3)
PROT UR-MCNC: SIGNIFICANT CHANGE UP
RBC # BLD: 4.43 M/UL — SIGNIFICANT CHANGE UP (ref 3.8–5.2)
RBC # FLD: 13.5 % — SIGNIFICANT CHANGE UP (ref 10.3–14.5)
SODIUM SERPL-SCNC: 139 MMOL/L — SIGNIFICANT CHANGE UP (ref 135–145)
SP GR SPEC: 1.01 — SIGNIFICANT CHANGE UP (ref 1.01–1.02)
UROBILINOGEN FLD QL: NEGATIVE — SIGNIFICANT CHANGE UP
WBC # BLD: 7.57 K/UL — SIGNIFICANT CHANGE UP (ref 3.8–10.5)
WBC # FLD AUTO: 7.57 K/UL — SIGNIFICANT CHANGE UP (ref 3.8–10.5)

## 2021-08-07 PROCEDURE — 72128 CT CHEST SPINE W/O DYE: CPT | Mod: 26,MA

## 2021-08-07 PROCEDURE — 93010 ELECTROCARDIOGRAM REPORT: CPT

## 2021-08-07 PROCEDURE — 72170 X-RAY EXAM OF PELVIS: CPT | Mod: 26

## 2021-08-07 PROCEDURE — 99285 EMERGENCY DEPT VISIT HI MDM: CPT

## 2021-08-07 PROCEDURE — 72131 CT LUMBAR SPINE W/O DYE: CPT | Mod: 26,MA

## 2021-08-07 PROCEDURE — 71045 X-RAY EXAM CHEST 1 VIEW: CPT | Mod: 26

## 2021-08-07 RX ORDER — ACETAMINOPHEN 500 MG
975 TABLET ORAL ONCE
Refills: 0 | Status: COMPLETED | OUTPATIENT
Start: 2021-08-07 | End: 2021-08-07

## 2021-08-07 RX ORDER — MORPHINE SULFATE 50 MG/1
7.5 CAPSULE, EXTENDED RELEASE ORAL ONCE
Refills: 0 | Status: DISCONTINUED | OUTPATIENT
Start: 2021-08-07 | End: 2021-08-07

## 2021-08-07 RX ORDER — CEFTRIAXONE 500 MG/1
1000 INJECTION, POWDER, FOR SOLUTION INTRAMUSCULAR; INTRAVENOUS ONCE
Refills: 0 | Status: COMPLETED | OUTPATIENT
Start: 2021-08-07 | End: 2021-08-07

## 2021-08-07 RX ORDER — POTASSIUM CHLORIDE 20 MEQ
40 PACKET (EA) ORAL ONCE
Refills: 0 | Status: COMPLETED | OUTPATIENT
Start: 2021-08-07 | End: 2021-08-07

## 2021-08-07 RX ORDER — LIDOCAINE 4 G/100G
1 CREAM TOPICAL ONCE
Refills: 0 | Status: COMPLETED | OUTPATIENT
Start: 2021-08-07 | End: 2021-08-07

## 2021-08-07 RX ORDER — POTASSIUM CHLORIDE 20 MEQ
10 PACKET (EA) ORAL
Refills: 0 | Status: DISCONTINUED | OUTPATIENT
Start: 2021-08-07 | End: 2021-08-08

## 2021-08-07 RX ADMIN — CEFTRIAXONE 100 MILLIGRAM(S): 500 INJECTION, POWDER, FOR SOLUTION INTRAMUSCULAR; INTRAVENOUS at 22:59

## 2021-08-07 RX ADMIN — Medication 40 MILLIEQUIVALENT(S): at 22:58

## 2021-08-07 RX ADMIN — MORPHINE SULFATE 7.5 MILLIGRAM(S): 50 CAPSULE, EXTENDED RELEASE ORAL at 20:14

## 2021-08-07 RX ADMIN — Medication 975 MILLIGRAM(S): at 22:49

## 2021-08-07 RX ADMIN — Medication 100 MILLIEQUIVALENT(S): at 23:29

## 2021-08-07 RX ADMIN — Medication 975 MILLIGRAM(S): at 20:14

## 2021-08-07 RX ADMIN — LIDOCAINE 1 PATCH: 4 CREAM TOPICAL at 20:14

## 2021-08-07 RX ADMIN — MORPHINE SULFATE 7.5 MILLIGRAM(S): 50 CAPSULE, EXTENDED RELEASE ORAL at 22:49

## 2021-08-07 NOTE — ED PROVIDER NOTE - PMH
Cardiovascular disease    GERD (gastroesophageal reflux disease)    H/O cardiac arrhythmia    H/O gastroesophageal reflux (GERD)    H/O: hypothyroidism    HLD (hyperlipidemia)    HTN (hypertension)    HTN (hypertension)    Hypothyroidism    Recurrent falls    Stroke  x4

## 2021-08-07 NOTE — ED PROVIDER NOTE - CLINICAL SUMMARY MEDICAL DECISION MAKING FREE TEXT BOX
Attending MD Paniagua:  89F fall 2 weeks prior with outpatient dx T9 compression fx presenting from home for difficulty managing pain and intermittent delirium from oxycodone. Patient on exam here is in no distress, conversant, no midline spinal ttp, no chest wall ttp, no head injury, nonfocal neuro exam. Plan for CT T spine to assess known fx for progression, PO analgesia, admit given difficulty managing pain at home.      *The above represents an initial assessment/impression. Please refer to progress notes for potential changes in patient clinical course* 89F w/ fall/T9 compression fracture 2 weeks ago unable to control pain at home d/t agitation. Will need imaging to assess fracture, will order CT Tspine, PO pain control to start and monitor mental status, will need to be admitted for further pain management and SW for dispo given additional resources may be needed to support pt at home.     Attending MD Paniagua:  89F fall 2 weeks prior with outpatient dx T9 compression fx presenting from home for difficulty managing pain and intermittent delirium from oxycodone. Patient on exam here is in no distress, conversant, no midline spinal ttp, no chest wall ttp, no head injury, nonfocal neuro exam. Plan for CT T spine to assess known fx for progression, PO analgesia, admit given difficulty managing pain at home.      *The above represents an initial assessment/impression. Please refer to progress notes for potential changes in patient clinical course*

## 2021-08-07 NOTE — ED PROVIDER NOTE - PHYSICAL EXAMINATION
GENERAL: NAD, well-groomed, well-developed  HEAD: Atraumatic, Normocephalic  EYES: EOMI, PERRLA, conjunctiva and sclera clear  ENMT: No tonsillar erythema, exudates, or enlargement; Moist mucous membranes  NECK: Supple, no thyroid  NERVOUS SYSTEM: Alert & Oriented X3 (self, year, president, season, situation), Good concentration; Motor Strength 5/5 B/L upper extremities, 4/5 in lower extremities, negative straight leg test  CHEST/LUNG: Clear to auscultation bilaterally; No rales, rhonchi, wheezing, or rubs  HEART: Regular rate and rhythm; S1 and S2; No murmurs, rubs, or gallops  ABDOMEN: Soft, mildly tender to palpation, Nondistended: Bowel sounds present  EXTREMITIES: 2+ Peripheral Pulses, No clubbing, cyanosis, or edema  SKIN: No rashes or lesions

## 2021-08-07 NOTE — ED PROVIDER NOTE - PROGRESS NOTE DETAILS
ED Sign Out, eval for acute on chronic low back pain / compression Fx, pending admission for pain control, PT/OT - Jony Locke MD Patient sleeping comfortably without pain. Arousable. Spoke with radiology, prelim read multi level compression fractures.

## 2021-08-07 NOTE — ED PROVIDER NOTE - NS ED ROS FT
CONSTITUTIONAL: No weakness, fevers or chills  EYES/ENT: No visual changes;  No vertigo or throat pain   NECK: No pain or stiffness  RESPIRATORY: No cough, wheezing, hemoptysis; No shortness of breath  CARDIOVASCULAR: No chest pain or palpitations  GASTROINTESTINAL: No abdominal or epigastric pain. No nausea, vomiting, or hematemesis; No diarrhea or constipation. No melena or hematochezia.  BACK: +back pain  GENITOURINARY: No dysuria, frequency or hematuria  NEUROLOGICAL: No numbness or weakness  SKIN: No itching, rashes

## 2021-08-07 NOTE — ED PROVIDER NOTE - ATTENDING CONTRIBUTION TO CARE
Attending MD Paniagua:  I personally have seen and examined this patient.  Resident note reviewed and agree on plan of care and except where noted.  See HPI, PE, and MDM for details.

## 2021-08-07 NOTE — ED ADULT TRIAGE NOTE - CHIEF COMPLAINT QUOTE
fall 2 weeks, vertebrae fx prescribed oxy, pain getting worse, intermittent confusion per daughter since starting medication

## 2021-08-07 NOTE — ED CLERICAL - NS ED CLERK NOTE PRE-ARRIVAL INFORMATION; ADDITIONAL PRE-ARRIVAL INFORMATION
CC/Reason For referral: pain control for t9 compression fx, pain not managed at home  Preferred Consultant(if applicable): na  Who admits for you (if needed): na  Do you have documents you would like to fax over?no  Would you still like to speak to an ED attending? if needed

## 2021-08-07 NOTE — ED PROVIDER NOTE - OBJECTIVE STATEMENT
89F hx afib w/ mechanical fall during transfer 2 weeks ago p/w back pain. Prior to fall, pt was ambulating w/ walker without assistance, however one night walker was too far away and patient fell, no head trauma. She had outpt imaging done which showed T9 compression fracture and prescribed oxy 5 for pain control to be taken TID. Per daughter, patient would become altered after getting opioid medications (baseline A&Ox4) which was not improved w/ seroquel. Daughter notes that pt had kyphoplasty in 2016 for similar injury which significantly alleviated her pain. Pt is DNR/DNI, MOLST previously filled out, hospice process in progress.     PCP: Maria Eugenia Treadwell

## 2021-08-07 NOTE — ED ADULT NURSE NOTE - OBJECTIVE STATEMENT
89y Female PMHx GERD and hypothyroid presenting to ED for back pain d/t fall 2 weeks ago. Pt had unwitnessed mechanical fall, no LOC or head trauma. Pt received imaging from PCP showed vertebrae fx, given oxy 5mg 3x/day for pain control, pt states pain is no longer relieved and medication makes her confused. Referred by her PCP to ED to assess for potential kyphoplasty for symptom relief. Pt has advanced directives at bedside. Upon exam pt A&Ox3, PERRL 3mm, equal b/l strength and sensation in all extremities, no vision changes. Denies dizziness, lightheaded, weakness, numbness, and tingling. VS stable, IV placed, labs drawn, awaits MD.

## 2021-08-08 DIAGNOSIS — Z71.89 OTHER SPECIFIED COUNSELING: ICD-10-CM

## 2021-08-08 DIAGNOSIS — S22.000A WEDGE COMPRESSION FRACTURE OF UNSPECIFIED THORACIC VERTEBRA, INITIAL ENCOUNTER FOR CLOSED FRACTURE: ICD-10-CM

## 2021-08-08 DIAGNOSIS — E03.9 HYPOTHYROIDISM, UNSPECIFIED: ICD-10-CM

## 2021-08-08 DIAGNOSIS — I48.91 UNSPECIFIED ATRIAL FIBRILLATION: ICD-10-CM

## 2021-08-08 DIAGNOSIS — Z79.899 OTHER LONG TERM (CURRENT) DRUG THERAPY: ICD-10-CM

## 2021-08-08 DIAGNOSIS — Z29.9 ENCOUNTER FOR PROPHYLACTIC MEASURES, UNSPECIFIED: ICD-10-CM

## 2021-08-08 DIAGNOSIS — I10 ESSENTIAL (PRIMARY) HYPERTENSION: ICD-10-CM

## 2021-08-08 LAB
ALBUMIN SERPL ELPH-MCNC: 3.2 G/DL — LOW (ref 3.3–5)
ALP SERPL-CCNC: 72 U/L — SIGNIFICANT CHANGE UP (ref 40–120)
ALT FLD-CCNC: 18 U/L — SIGNIFICANT CHANGE UP (ref 10–45)
ANION GAP SERPL CALC-SCNC: 14 MMOL/L — SIGNIFICANT CHANGE UP (ref 5–17)
ANION GAP SERPL CALC-SCNC: 14 MMOL/L — SIGNIFICANT CHANGE UP (ref 5–17)
AST SERPL-CCNC: 43 U/L — HIGH (ref 10–40)
BILIRUB SERPL-MCNC: 0.3 MG/DL — SIGNIFICANT CHANGE UP (ref 0.2–1.2)
BUN SERPL-MCNC: 14 MG/DL — SIGNIFICANT CHANGE UP (ref 7–23)
BUN SERPL-MCNC: 14 MG/DL — SIGNIFICANT CHANGE UP (ref 7–23)
CALCIUM SERPL-MCNC: 8.7 MG/DL — SIGNIFICANT CHANGE UP (ref 8.4–10.5)
CALCIUM SERPL-MCNC: 8.8 MG/DL — SIGNIFICANT CHANGE UP (ref 8.4–10.5)
CHLORIDE SERPL-SCNC: 103 MMOL/L — SIGNIFICANT CHANGE UP (ref 96–108)
CHLORIDE SERPL-SCNC: 104 MMOL/L — SIGNIFICANT CHANGE UP (ref 96–108)
CO2 SERPL-SCNC: 23 MMOL/L — SIGNIFICANT CHANGE UP (ref 22–31)
CO2 SERPL-SCNC: 23 MMOL/L — SIGNIFICANT CHANGE UP (ref 22–31)
CREAT SERPL-MCNC: 0.88 MG/DL — SIGNIFICANT CHANGE UP (ref 0.5–1.3)
CREAT SERPL-MCNC: 0.95 MG/DL — SIGNIFICANT CHANGE UP (ref 0.5–1.3)
GLUCOSE SERPL-MCNC: 101 MG/DL — HIGH (ref 70–99)
GLUCOSE SERPL-MCNC: 95 MG/DL — SIGNIFICANT CHANGE UP (ref 70–99)
POTASSIUM SERPL-MCNC: 5.3 MMOL/L — SIGNIFICANT CHANGE UP (ref 3.5–5.3)
POTASSIUM SERPL-MCNC: 5.5 MMOL/L — HIGH (ref 3.5–5.3)
POTASSIUM SERPL-SCNC: 5.3 MMOL/L — SIGNIFICANT CHANGE UP (ref 3.5–5.3)
POTASSIUM SERPL-SCNC: 5.5 MMOL/L — HIGH (ref 3.5–5.3)
PROT SERPL-MCNC: 7.1 G/DL — SIGNIFICANT CHANGE UP (ref 6–8.3)
SARS-COV-2 RNA SPEC QL NAA+PROBE: SIGNIFICANT CHANGE UP
SODIUM SERPL-SCNC: 140 MMOL/L — SIGNIFICANT CHANGE UP (ref 135–145)
SODIUM SERPL-SCNC: 141 MMOL/L — SIGNIFICANT CHANGE UP (ref 135–145)

## 2021-08-08 PROCEDURE — 99223 1ST HOSP IP/OBS HIGH 75: CPT

## 2021-08-08 PROCEDURE — 12345: CPT | Mod: NC

## 2021-08-08 RX ORDER — AMIODARONE HYDROCHLORIDE 400 MG/1
200 TABLET ORAL DAILY
Refills: 0 | Status: DISCONTINUED | OUTPATIENT
Start: 2021-08-08 | End: 2021-08-12

## 2021-08-08 RX ORDER — QUETIAPINE FUMARATE 200 MG/1
25 TABLET, FILM COATED ORAL AT BEDTIME
Refills: 0 | Status: DISCONTINUED | OUTPATIENT
Start: 2021-08-08 | End: 2021-08-08

## 2021-08-08 RX ORDER — HEPARIN SODIUM 5000 [USP'U]/ML
5000 INJECTION INTRAVENOUS; SUBCUTANEOUS EVERY 12 HOURS
Refills: 0 | Status: DISCONTINUED | OUTPATIENT
Start: 2021-08-08 | End: 2021-08-12

## 2021-08-08 RX ORDER — OXYCODONE HYDROCHLORIDE 5 MG/1
5 TABLET ORAL EVERY 4 HOURS
Refills: 0 | Status: DISCONTINUED | OUTPATIENT
Start: 2021-08-08 | End: 2021-08-11

## 2021-08-08 RX ORDER — LANOLIN ALCOHOL/MO/W.PET/CERES
3 CREAM (GRAM) TOPICAL AT BEDTIME
Refills: 0 | Status: DISCONTINUED | OUTPATIENT
Start: 2021-08-08 | End: 2021-08-12

## 2021-08-08 RX ORDER — SENNA PLUS 8.6 MG/1
2 TABLET ORAL AT BEDTIME
Refills: 0 | Status: DISCONTINUED | OUTPATIENT
Start: 2021-08-08 | End: 2021-08-12

## 2021-08-08 RX ORDER — ALENDRONATE SODIUM 70 MG/1
0 TABLET ORAL
Qty: 0 | Refills: 0 | DISCHARGE

## 2021-08-08 RX ORDER — LEVOTHYROXINE SODIUM 125 MCG
125 TABLET ORAL DAILY
Refills: 0 | Status: DISCONTINUED | OUTPATIENT
Start: 2021-08-08 | End: 2021-08-12

## 2021-08-08 RX ORDER — ONDANSETRON 8 MG/1
4 TABLET, FILM COATED ORAL EVERY 8 HOURS
Refills: 0 | Status: DISCONTINUED | OUTPATIENT
Start: 2021-08-08 | End: 2021-08-08

## 2021-08-08 RX ORDER — LISINOPRIL 2.5 MG/1
20 TABLET ORAL DAILY
Refills: 0 | Status: DISCONTINUED | OUTPATIENT
Start: 2021-08-08 | End: 2021-08-08

## 2021-08-08 RX ORDER — L.ACIDOPH/B.ANIMALIS/B.LONGUM 15B CELL
0 CAPSULE ORAL
Qty: 0 | Refills: 0 | DISCHARGE

## 2021-08-08 RX ORDER — ATORVASTATIN CALCIUM 80 MG/1
20 TABLET, FILM COATED ORAL AT BEDTIME
Refills: 0 | Status: DISCONTINUED | OUTPATIENT
Start: 2021-08-08 | End: 2021-08-12

## 2021-08-08 RX ORDER — SODIUM CHLORIDE 9 MG/ML
1000 INJECTION, SOLUTION INTRAVENOUS
Refills: 0 | Status: DISCONTINUED | OUTPATIENT
Start: 2021-08-08 | End: 2021-08-08

## 2021-08-08 RX ORDER — LIDOCAINE 4 G/100G
1 CREAM TOPICAL DAILY
Refills: 0 | Status: DISCONTINUED | OUTPATIENT
Start: 2021-08-08 | End: 2021-08-12

## 2021-08-08 RX ORDER — ACETAMINOPHEN 500 MG
650 TABLET ORAL EVERY 6 HOURS
Refills: 0 | Status: DISCONTINUED | OUTPATIENT
Start: 2021-08-08 | End: 2021-08-10

## 2021-08-08 RX ORDER — GABAPENTIN 400 MG/1
100 CAPSULE ORAL AT BEDTIME
Refills: 0 | Status: DISCONTINUED | OUTPATIENT
Start: 2021-08-08 | End: 2021-08-08

## 2021-08-08 RX ORDER — FOLIC ACID/VIT B COMPLEX AND C 400 MCG
0 TABLET ORAL
Qty: 0 | Refills: 0 | DISCHARGE

## 2021-08-08 RX ORDER — LISINOPRIL 2.5 MG/1
20 TABLET ORAL DAILY
Refills: 0 | Status: DISCONTINUED | OUTPATIENT
Start: 2021-08-08 | End: 2021-08-09

## 2021-08-08 RX ORDER — AMLODIPINE BESYLATE 2.5 MG/1
5 TABLET ORAL DAILY
Refills: 0 | Status: DISCONTINUED | OUTPATIENT
Start: 2021-08-08 | End: 2021-08-08

## 2021-08-08 RX ORDER — MORPHINE SULFATE 50 MG/1
2 CAPSULE, EXTENDED RELEASE ORAL EVERY 6 HOURS
Refills: 0 | Status: DISCONTINUED | OUTPATIENT
Start: 2021-08-08 | End: 2021-08-11

## 2021-08-08 RX ORDER — AMLODIPINE BESYLATE 2.5 MG/1
5 TABLET ORAL DAILY
Refills: 0 | Status: DISCONTINUED | OUTPATIENT
Start: 2021-08-08 | End: 2021-08-09

## 2021-08-08 RX ORDER — QUETIAPINE FUMARATE 200 MG/1
1 TABLET, FILM COATED ORAL
Qty: 0 | Refills: 0 | DISCHARGE

## 2021-08-08 RX ORDER — POLYETHYLENE GLYCOL 3350 17 G/17G
17 POWDER, FOR SOLUTION ORAL DAILY
Refills: 0 | Status: DISCONTINUED | OUTPATIENT
Start: 2021-08-08 | End: 2021-08-12

## 2021-08-08 RX ORDER — LISINOPRIL 2.5 MG/1
1 TABLET ORAL
Qty: 0 | Refills: 0 | DISCHARGE

## 2021-08-08 RX ADMIN — HEPARIN SODIUM 5000 UNIT(S): 5000 INJECTION INTRAVENOUS; SUBCUTANEOUS at 18:40

## 2021-08-08 RX ADMIN — Medication 10 MILLIEQUIVALENT(S): at 01:47

## 2021-08-08 RX ADMIN — LIDOCAINE 1 PATCH: 4 CREAM TOPICAL at 12:45

## 2021-08-08 RX ADMIN — OXYCODONE HYDROCHLORIDE 5 MILLIGRAM(S): 5 TABLET ORAL at 08:03

## 2021-08-08 RX ADMIN — CEFTRIAXONE 1000 MILLIGRAM(S): 500 INJECTION, POWDER, FOR SOLUTION INTRAMUSCULAR; INTRAVENOUS at 01:47

## 2021-08-08 RX ADMIN — Medication 125 MICROGRAM(S): at 05:34

## 2021-08-08 RX ADMIN — ATORVASTATIN CALCIUM 20 MILLIGRAM(S): 80 TABLET, FILM COATED ORAL at 22:11

## 2021-08-08 RX ADMIN — LISINOPRIL 20 MILLIGRAM(S): 2.5 TABLET ORAL at 05:35

## 2021-08-08 RX ADMIN — AMIODARONE HYDROCHLORIDE 200 MILLIGRAM(S): 400 TABLET ORAL at 05:34

## 2021-08-08 RX ADMIN — SODIUM CHLORIDE 100 MILLILITER(S): 9 INJECTION, SOLUTION INTRAVENOUS at 00:30

## 2021-08-08 RX ADMIN — MORPHINE SULFATE 2 MILLIGRAM(S): 50 CAPSULE, EXTENDED RELEASE ORAL at 05:37

## 2021-08-08 RX ADMIN — Medication 1 TABLET(S): at 12:45

## 2021-08-08 RX ADMIN — MORPHINE SULFATE 2 MILLIGRAM(S): 50 CAPSULE, EXTENDED RELEASE ORAL at 05:59

## 2021-08-08 RX ADMIN — AMLODIPINE BESYLATE 5 MILLIGRAM(S): 2.5 TABLET ORAL at 05:35

## 2021-08-08 RX ADMIN — HEPARIN SODIUM 5000 UNIT(S): 5000 INJECTION INTRAVENOUS; SUBCUTANEOUS at 05:35

## 2021-08-08 RX ADMIN — Medication 3 MILLIGRAM(S): at 22:11

## 2021-08-08 RX ADMIN — LISINOPRIL 20 MILLIGRAM(S): 2.5 TABLET ORAL at 22:11

## 2021-08-08 NOTE — H&P ADULT - NSHPPHYSICALEXAM_GEN_ALL_CORE
Vital Signs Last 24 Hrs  T(C): 36.8 (07 Aug 2021 21:50), Max: 36.8 (07 Aug 2021 21:50)  T(F): 98.2 (07 Aug 2021 21:50), Max: 98.2 (07 Aug 2021 21:50)  HR: 55 (08 Aug 2021 01:15) (50 - 55)  BP: 152/76 (08 Aug 2021 01:15) (133/66 - 181/79)  BP(mean): 95 (08 Aug 2021 01:15) (95 - 95)  RR: 19 (08 Aug 2021 01:15) (16 - 19)  SpO2: 95% (08 Aug 2021 01:15) (95% - 99%) Vital Signs Last 24 Hrs  T(C): 36.8 (07 Aug 2021 21:50), Max: 36.8 (07 Aug 2021 21:50)  T(F): 98.2 (07 Aug 2021 21:50), Max: 98.2 (07 Aug 2021 21:50)  HR: 55 (08 Aug 2021 01:15) (50 - 55)  BP: 152/76 (08 Aug 2021 01:15) (133/66 - 181/79)  BP(mean): 95 (08 Aug 2021 01:15) (95 - 95)  RR: 19 (08 Aug 2021 01:15) (16 - 19)  SpO2: 95% (08 Aug 2021 01:15) (95% - 99%)    GENERAL:  elderly frail , acutely distressed from pain , laying in fetal position   HEAD:  Atraumatic, Normocephalic  ENT: EOMI, PERRLA, conjunctiva and sclera clear,  moist mucosa no pharyngeal erythema or exudates   NECK: supple , no JVD   CHEST/LUNG: Clear to auscultation bilaterally; No wheeze, equal breath sounds bilaterally   BACK:  limited exam as patient declined to be turned   HEART: Regular rate and rhythm; No murmurs, rubs, or gallops  ABDOMEN: Soft, Nontender, Nondistended; Bowel sounds present  EXTREMITIES:  No clubbing, cyanosis, or edema  MSK: No joint swelling or effusions, ROM intact   PSYCH: Normal behavior/affect  NEUROLOGY: AAOx3, non-focal, cranial nerves intact  SKIN: Normal color, No rashes or lesions

## 2021-08-08 NOTE — H&P ADULT - NSHPREVIEWOFSYSTEMS_GEN_ALL_CORE
CONSTITUTIONAL: No weakness, fevers or chills  EYES/ENT: No visual changes;  No dysphagia  NECK: No pain or stiffness  RESPIRATORY: No cough, wheezing, hemoptysis; No shortness of breath  CARDIOVASCULAR: No chest pain or palpitations; No lower extremity edema  EXTREMITIES: no le edema, cyanosis, clubbing  GASTROINTESTINAL: No abdominal or epigastric pain. No nausea, vomiting, or hematemesis; No diarrhea + constipation. No melena or hematochezia.  BACK: + back pain  GENITOURINARY: No dysuria, frequency or hematuria  NEUROLOGICAL: No numbness or weakness  MSK: no joint swelling or pain  SKIN: No itching, burning, rashes, or lesions   PSYCH: + agitation   All other review of systems is negative unless indicated above.

## 2021-08-08 NOTE — PROGRESS NOTE ADULT - PROBLEM SELECTOR PLAN 1
no neurologic deficits , reported multiple vertebral fractures   -  CT T and L spine --> < from: CT Lumbar Spine No Cont (08.07.21 @ 21:20) > Multilevel age-indeterminate compression deformities. Diffuse osteopenia advanced multilevel spondylosis. Small bilateral pleural effusions with overlying compressive atelectasis. Bilateral renal and hepatic cysts. < end of copied text >  - tylenol PRN for mild pain  - oxycodone IR PRN for moderate pain  - IV morphine PRN for severe pain   - Senna, miralax   - Fall precautions, ambulate with assistance  - PT  - continue Seroquel no neurologic deficits , reported multiple vertebral fractures   -  CT T and L spine --> < from: CT Lumbar Spine No Cont (08.07.21 @ 21:20) > Multilevel age-indeterminate compression deformities. Diffuse osteopenia advanced multilevel spondylosis. Small bilateral pleural effusions with overlying compressive atelectasis. Bilateral renal and hepatic cysts. < end of copied text >  - tylenol PRN for mild pain  - oxycodone IR PRN for moderate pain  - IV morphine PRN for severe pain   - Senna, miralax   - Fall precautions, ambulate with assistance  - PT  - will get IR eval to kyphoplasty for stabilization of compression fracture to help with pain.  - continue Seroquel

## 2021-08-08 NOTE — PROGRESS NOTE ADULT - SUBJECTIVE AND OBJECTIVE BOX
Andre Reyes, M.D.  Pager: [phone]  Office: [phone]    Patient is a 89y old  Female who presents with a chief complaint of back pain x 2 weeks (08 Aug 2021 03:44)          SUBJECTIVE / OVERNIGHT EVENTS:    No acute overnight events.    ROS: (  ) Fever, (  )Chills,  (  )Nausea/Vomiting, (  ) Cough, (  )Shortness of breath, (  )Chest Pain    MEDICATIONS  (STANDING):  aMIOdarone    Tablet 200 milliGRAM(s) Oral daily  amLODIPine   Tablet 5 milliGRAM(s) Oral daily  atorvastatin 20 milliGRAM(s) Oral at bedtime  calcium carbonate 1250 mG  + Vitamin D (OsCal 500 + D) 1 Tablet(s) Oral daily  heparin   Injectable 5000 Unit(s) SubCutaneous every 12 hours  lactated ringers 1000 milliLiter(s) (100 mL/Hr) IV Continuous <Continuous>  levothyroxine 125 MICROGram(s) Oral daily  lidocaine   4% Patch 1 Patch Transdermal daily  lisinopril 20 milliGRAM(s) Oral daily  polyethylene glycol 3350 17 Gram(s) Oral daily  propranolol 40 milliGRAM(s) Oral two times a day  senna 2 Tablet(s) Oral at bedtime    MEDICATIONS  (PRN):  acetaminophen   Tablet .. 650 milliGRAM(s) Oral every 6 hours PRN Temp greater or equal to 38.5C (101.3F), Mild Pain (1 - 3)  aluminum hydroxide/magnesium hydroxide/simethicone Suspension 30 milliLiter(s) Oral every 4 hours PRN Dyspepsia  melatonin 3 milliGRAM(s) Oral at bedtime PRN Insomnia  morphine  - Injectable 2 milliGRAM(s) IV Push every 6 hours PRN Severe Pain (7 - 10)  ondansetron Injectable 4 milliGRAM(s) IV Push every 8 hours PRN Nausea and/or Vomiting  oxyCODONE    IR 5 milliGRAM(s) Oral every 4 hours PRN Moderate Pain (4 - 6)          T(C): 36.8 (08-08 @ 09:08), Max: 36.8 (08-07 @ 21:50)   HR: 57   BP: 156/76   RR: 18   SpO2: 95%    PHYSICAL EXAM:    CONSTITUTIONAL: NAD, well-developed, well-groomed  EYES: PERRLA; conjunctiva and sclera clear  ENMT: Moist oral mucosa, no pharyngeal injection or exudates; normal dentition  NECK: Supple, no palpable masses; no thyromegaly  RESPIRATORY: Normal respiratory effort; lungs are clear to auscultation bilaterally  CARDIOVASCULAR: Regular rate and rhythm, normal S1 and S2, no murmur/rub/gallop; No lower extremity edema; Peripheral pulses are 2+ bilaterally  ABDOMEN: Nontender to palpation, normoactive bowel sounds, no rebound/guarding; No hepatosplenomegaly  MUSCULOSKELETAL:  Normal gait; no clubbing or cyanosis of digits; no joint swelling or tenderness to palpation  PSYCH: A+O to person, place, and time; affect appropriate  NEUROLOGY: CN 2-12 are intact and symmetric; no gross sensory deficits   SKIN: No rashes; no palpable lesions      LABS:                        13.5   7.57  )-----------( 307      ( 07 Aug 2021 20:16 )             40.4      08-08    141  |  104  |  14  ----------------------------<  95  5.5<H>   |  23  |  0.88    Ca    8.7      08 Aug 2021 06:49    TPro  7.1  /  Alb  3.2<L>  /  TBili  0.3  /  DBili  x   /  AST  43<H>  /  ALT  18  /  AlkPhos  72  08-08       CAPILLARY BLOOD GLUCOSE          RADIOLOGY & ADDITIONAL TESTS:    Imaging Personally Reviewed:  Consultant(s) Notes Reviewed:    Care Discussed with Consultants/Other Providers:   Andre Reyes, M.D.  Pager: [phone]  Office: [phone]    Patient is a 89y old  Female who presents with a chief complaint of back pain x 2 weeks (08 Aug 2021 03:44)          SUBJECTIVE / OVERNIGHT EVENTS:    No acute overnight events.  daughter at bedside  back pain is controlled with current pain regimen        MEDICATIONS  (STANDING):  aMIOdarone    Tablet 200 milliGRAM(s) Oral daily  amLODIPine   Tablet 5 milliGRAM(s) Oral daily  atorvastatin 20 milliGRAM(s) Oral at bedtime  calcium carbonate 1250 mG  + Vitamin D (OsCal 500 + D) 1 Tablet(s) Oral daily  heparin   Injectable 5000 Unit(s) SubCutaneous every 12 hours  lactated ringers 1000 milliLiter(s) (100 mL/Hr) IV Continuous <Continuous>  levothyroxine 125 MICROGram(s) Oral daily  lidocaine   4% Patch 1 Patch Transdermal daily  lisinopril 20 milliGRAM(s) Oral daily  polyethylene glycol 3350 17 Gram(s) Oral daily  propranolol 40 milliGRAM(s) Oral two times a day  senna 2 Tablet(s) Oral at bedtime    MEDICATIONS  (PRN):  acetaminophen   Tablet .. 650 milliGRAM(s) Oral every 6 hours PRN Temp greater or equal to 38.5C (101.3F), Mild Pain (1 - 3)  aluminum hydroxide/magnesium hydroxide/simethicone Suspension 30 milliLiter(s) Oral every 4 hours PRN Dyspepsia  melatonin 3 milliGRAM(s) Oral at bedtime PRN Insomnia  morphine  - Injectable 2 milliGRAM(s) IV Push every 6 hours PRN Severe Pain (7 - 10)  ondansetron Injectable 4 milliGRAM(s) IV Push every 8 hours PRN Nausea and/or Vomiting  oxyCODONE    IR 5 milliGRAM(s) Oral every 4 hours PRN Moderate Pain (4 - 6)          T(C): 36.8 (08-08 @ 09:08), Max: 36.8 (08-07 @ 21:50)   HR: 57   BP: 156/76   RR: 18   SpO2: 95%    PHYSICAL EXAM:    CONSTITUTIONAL: NAD, well-developed, well-groomed  EYES: PERRLA; conjunctiva and sclera clear  ENMT: Moist oral mucosa, no pharyngeal injection or exudates; normal dentition  NECK: Supple, no palpable masses; no thyromegaly  RESPIRATORY: Normal respiratory effort; lungs are clear to auscultation bilaterally  CARDIOVASCULAR: Regular rate and rhythm, normal S1 and S2, no murmur/rub/gallop; No lower extremity edema; Peripheral pulses are 2+ bilaterally  ABDOMEN: Nontender to palpation, normoactive bowel sounds, no rebound/guarding; No hepatosplenomegaly  MUSCULOSKELETAL:  Normal gait; no clubbing or cyanosis of digits; no joint swelling or tenderness to palpation; midthoracic spinal tenderness   PSYCH: A+O to person, place, and time; affect appropriate  NEUROLOGY: CN 2-12 are intact and symmetric; no gross sensory deficits         LABS:                        13.5   7.57  )-----------( 307      ( 07 Aug 2021 20:16 )             40.4      08-08    141  |  104  |  14  ----------------------------<  95  5.5<H>   |  23  |  0.88    Ca    8.7      08 Aug 2021 06:49    TPro  7.1  /  Alb  3.2<L>  /  TBili  0.3  /  DBili  x   /  AST  43<H>  /  ALT  18  /  AlkPhos  72  08-08       CAPILLARY BLOOD GLUCOSE          RADIOLOGY & ADDITIONAL TESTS:    Imaging Personally Reviewed:  Consultant(s) Notes Reviewed:    Care Discussed with Consultants/Other Providers:

## 2021-08-08 NOTE — H&P ADULT - ASSESSMENT
89 year old female with PMH of TIA, Htn, Thyroid disease, GERD, multiple falls with vertebral fractures, presents for worsening back pain after recent fall.

## 2021-08-08 NOTE — ED ADULT NURSE REASSESSMENT NOTE - NS ED NURSE REASSESS COMMENT FT1
Pt c/o severe pain in arm d/t potassium, attempted slowing rate and other comfort measures but pt reports pain despite efforts. dr. pineda aware and changing order. awaiting fluid solution from pharmacy.

## 2021-08-08 NOTE — CHART NOTE - NSCHARTNOTEFT_GEN_A_CORE
Patient seen by team 4 medicine and plan confirmed with attending.    Nela Alvarado M.D.  Internal Medicine, PGY3  [facility]: [phone]; [facility]: [phone]

## 2021-08-08 NOTE — CONSULT NOTE ADULT - SUBJECTIVE AND OBJECTIVE BOX
Vascular & Interventional Radiology Brief Consult Note    Evaluate for Procedure: Vertebroplasty    HPI: 89y Female with PMH TIA, HTN, A.fib, multiple falls with vertebral compression fractures s/p fall two weeks ago admitted with worsening back pain and CT showing multilevel age indeterminate vertebral compression fractures. IR consulted for vertebroplasty.     Allergies:   Medications (Abx/Cardiac/Anticoagulation/Blood Products)  aMIOdarone    Tablet: 200 milliGRAM(s) Oral (08-08 @ 05:34)  amLODIPine   Tablet: 5 milliGRAM(s) Oral (08-08 @ 05:35)  cefTRIAXone   IVPB: 100 mL/Hr IV Intermittent (08-07 @ 22:59)  heparin   Injectable: 5000 Unit(s) SubCutaneous (08-08 @ 18:40)  lisinopril: 20 milliGRAM(s) Oral (08-08 @ 05:35)  propranolol: 40 milliGRAM(s) Oral (08-08 @ 05:34)  propranolol: 40 milliGRAM(s) Oral (08-08 @ 18:40)    Data:  157.5  59.2  T(C): 36.6  HR: 63  BP: 184/94  RR: 16  SpO2: 94%    -WBC 7.57 / HgB 13.5 / Hct 40.4 / Plt 307  -Na 141 / Cl 104 / BUN 14 / Glucose 95  -K 5.5 / CO2 23 / Cr 0.88  -ALT 18 / Alk Phos 72 / T.Bili 0.3    Imaging: Reviewed

## 2021-08-08 NOTE — H&P ADULT - PROBLEM SELECTOR PLAN 1
no neurologic deficits , reported multiple vertebral fractures   - f/u  CT T and L spine   - tylenol PRN for mild pain  -  oxycodone IR PRN for moderate pain  - IV morphine PRN for severe pain   -  Senna, miralax   - Fall precautions, ambulate with assistance  - PT  - continue Seroquel

## 2021-08-08 NOTE — H&P ADULT - NSHPLABSRESULTS_GEN_ALL_CORE
Labs personally reviewed:                          13.5   7.57  )-----------( 307      ( 07 Aug 2021 20:16 )             40.4     08-    139  |  99  |  14  ----------------------------<  104<H>  3.1<L>   |  24  |  1.00    Ca    9.3      07 Aug 2021 19:36    TPro  7.5  /  Alb  3.6  /  TBili  0.4  /  DBili  x   /  AST  30  /  ALT  17  /  AlkPhos  80  08        LIVER FUNCTIONS - ( 07 Aug 2021 19:36 )  Alb: 3.6 g/dL / Pro: 7.5 g/dL / ALK PHOS: 80 U/L / ALT: 17 U/L / AST: 30 U/L / GGT: x             Urinalysis Basic - ( 07 Aug 2021 21:27 )    Color: Yellow / Appearance: Slightly Turbid / S.012 / pH: x  Gluc: x / Ketone: Negative  / Bili: Negative / Urobili: Negative   Blood: x / Protein: Trace / Nitrite: Negative   Leuk Esterase: Large / RBC: 15 /hpf / WBC 20 /HPF   Sq Epi: x / Non Sq Epi: 0 /hpf / Bacteria: Many      CAPILLARY BLOOD GLUCOSE          Imaging:  CXR personally reviewed: no focal opacity  Pelvis Xray: No fracture or dislocation.  CT L and T spine : performed , results pending       EKG personally reviewed: Sinus bradycardia at 54 bpm ,  1st deg av block

## 2021-08-08 NOTE — H&P ADULT - HISTORY OF PRESENT ILLNESS
Patient is a 89 year old female with PMH of TIA, Htn, Thyroid disease, GERD, multiple falls with vertebral fractures, presents for worsening back pain after recent fall.    Patient is a 89 year old female with PMH of TIA, Htn, Thyroid disease, GERD, multiple falls with vertebral fractures, presents for worsening back pain after recent fall.   Patient sustained a mechanical fall about two weeks prior to admission, imaging  at the time showed a T9 compression fracture and she was subsequently prescribed oxycodone . However ,  patient poorly tolerated the medication and became delirious and confused , prompting family to reduce the dose , and  patient was also tarted on Seroquel .  Patient now reports severe mid back pain , non radiating , associated with decreased function and ability to care for self because of the pain , as well as decreased ambulation. She reports she is a burden on her family , and no longer wants to suffer.

## 2021-08-08 NOTE — H&P ADULT - NSICDXPASTMEDICALHX_GEN_ALL_CORE_FT
PAST MEDICAL HISTORY:  Cardiovascular disease     GERD (gastroesophageal reflux disease)     H/O cardiac arrhythmia     H/O gastroesophageal reflux (GERD)     H/O: hypothyroidism     HLD (hyperlipidemia)     HTN (hypertension)     HTN (hypertension)     Hypothyroidism     Recurrent falls     Stroke x4

## 2021-08-08 NOTE — H&P ADULT - PROBLEM SELECTOR PLAN 5
patient unable to confirm medication and dosing , family unavailable at time of interview ,  pharm tech emailed to update and verify home medications , Day team to reconcile once completed

## 2021-08-08 NOTE — ED ADULT NURSE REASSESSMENT NOTE - NS ED NURSE REASSESS COMMENT FT1
Report received from Buffy ROMAN. AOx3, answering questions. Unlabored, spontaneous respirations, NAD, O2 sat 98% RA. Equal strength and sensation in all 4 extremities. Admitted to medicine, diagnosis of compression of thoracic vertebrae. Report received from Buffy ROMAN. AOx3, answering questions. Unlabored, spontaneous respirations, NAD, O2 sat 98% RA. Weakness noted to bilateral lower extremities. Admitted to medicine, diagnosis of compression of thoracic vertebrae.

## 2021-08-09 ENCOUNTER — TRANSCRIPTION ENCOUNTER (OUTPATIENT)
Age: 86
End: 2021-08-09

## 2021-08-09 DIAGNOSIS — I48.21 PERMANENT ATRIAL FIBRILLATION: ICD-10-CM

## 2021-08-09 LAB
ALBUMIN SERPL ELPH-MCNC: 3.6 G/DL — SIGNIFICANT CHANGE UP (ref 3.3–5)
ALBUMIN SERPL ELPH-MCNC: 3.9 G/DL
ALP BLD-CCNC: 82 U/L
ALP SERPL-CCNC: 87 U/L — SIGNIFICANT CHANGE UP (ref 40–120)
ALT FLD-CCNC: 14 U/L — SIGNIFICANT CHANGE UP (ref 10–45)
ALT SERPL-CCNC: 16 U/L
ANION GAP SERPL CALC-SCNC: 14 MMOL/L — SIGNIFICANT CHANGE UP (ref 5–17)
ANION GAP SERPL CALC-SCNC: 16 MMOL/L
AST SERPL-CCNC: 23 U/L — SIGNIFICANT CHANGE UP (ref 10–40)
AST SERPL-CCNC: 27 U/L
BILIRUB SERPL-MCNC: 0.4 MG/DL
BILIRUB SERPL-MCNC: 0.4 MG/DL — SIGNIFICANT CHANGE UP (ref 0.2–1.2)
BUN SERPL-MCNC: 12 MG/DL — SIGNIFICANT CHANGE UP (ref 7–23)
BUN SERPL-MCNC: 15 MG/DL
CALCIUM SERPL-MCNC: 9 MG/DL
CALCIUM SERPL-MCNC: 9.1 MG/DL — SIGNIFICANT CHANGE UP (ref 8.4–10.5)
CHLORIDE SERPL-SCNC: 101 MMOL/L — SIGNIFICANT CHANGE UP (ref 96–108)
CHLORIDE SERPL-SCNC: 98 MMOL/L
CO2 SERPL-SCNC: 25 MMOL/L
CO2 SERPL-SCNC: 26 MMOL/L — SIGNIFICANT CHANGE UP (ref 22–31)
COVID-19 SPIKE DOMAIN AB INTERP: POSITIVE
COVID-19 SPIKE DOMAIN ANTIBODY RESULT: >250 U/ML — HIGH
CREAT SERPL-MCNC: 0.81 MG/DL — SIGNIFICANT CHANGE UP (ref 0.5–1.3)
CREAT SERPL-MCNC: 1.02 MG/DL
GLUCOSE SERPL-MCNC: 117 MG/DL
GLUCOSE SERPL-MCNC: 89 MG/DL — SIGNIFICANT CHANGE UP (ref 70–99)
HCT VFR BLD CALC: 37.3 % — SIGNIFICANT CHANGE UP (ref 34.5–45)
HCT VFR BLD CALC: 39.9 % — SIGNIFICANT CHANGE UP (ref 34.5–45)
HGB BLD-MCNC: 12.4 G/DL — SIGNIFICANT CHANGE UP (ref 11.5–15.5)
HGB BLD-MCNC: 13.2 G/DL — SIGNIFICANT CHANGE UP (ref 11.5–15.5)
MAGNESIUM SERPL-MCNC: 1.9 MG/DL — SIGNIFICANT CHANGE UP (ref 1.6–2.6)
MCHC RBC-ENTMCNC: 30.6 PG — SIGNIFICANT CHANGE UP (ref 27–34)
MCHC RBC-ENTMCNC: 30.7 PG — SIGNIFICANT CHANGE UP (ref 27–34)
MCHC RBC-ENTMCNC: 33.1 GM/DL — SIGNIFICANT CHANGE UP (ref 32–36)
MCHC RBC-ENTMCNC: 33.2 GM/DL — SIGNIFICANT CHANGE UP (ref 32–36)
MCV RBC AUTO: 92.1 FL — SIGNIFICANT CHANGE UP (ref 80–100)
MCV RBC AUTO: 92.8 FL — SIGNIFICANT CHANGE UP (ref 80–100)
NRBC # BLD: 0 /100 WBCS — SIGNIFICANT CHANGE UP (ref 0–0)
NRBC # BLD: 0 /100 WBCS — SIGNIFICANT CHANGE UP (ref 0–0)
PHOSPHATE SERPL-MCNC: 2.6 MG/DL — SIGNIFICANT CHANGE UP (ref 2.5–4.5)
PLATELET # BLD AUTO: 291 K/UL — SIGNIFICANT CHANGE UP (ref 150–400)
PLATELET # BLD AUTO: 318 K/UL — SIGNIFICANT CHANGE UP (ref 150–400)
POTASSIUM SERPL-MCNC: 3.6 MMOL/L — SIGNIFICANT CHANGE UP (ref 3.5–5.3)
POTASSIUM SERPL-SCNC: 3.5 MMOL/L
POTASSIUM SERPL-SCNC: 3.6 MMOL/L — SIGNIFICANT CHANGE UP (ref 3.5–5.3)
PROT SERPL-MCNC: 7.1 G/DL
PROT SERPL-MCNC: 7.3 G/DL — SIGNIFICANT CHANGE UP (ref 6–8.3)
RBC # BLD: 4.05 M/UL — SIGNIFICANT CHANGE UP (ref 3.8–5.2)
RBC # BLD: 4.3 M/UL — SIGNIFICANT CHANGE UP (ref 3.8–5.2)
RBC # FLD: 13.6 % — SIGNIFICANT CHANGE UP (ref 10.3–14.5)
RBC # FLD: 13.6 % — SIGNIFICANT CHANGE UP (ref 10.3–14.5)
SARS-COV-2 IGG+IGM SERPL QL IA: >250 U/ML — HIGH
SARS-COV-2 IGG+IGM SERPL QL IA: POSITIVE
SODIUM SERPL-SCNC: 139 MMOL/L
SODIUM SERPL-SCNC: 141 MMOL/L — SIGNIFICANT CHANGE UP (ref 135–145)
WBC # BLD: 6.92 K/UL — SIGNIFICANT CHANGE UP (ref 3.8–10.5)
WBC # BLD: 7.11 K/UL — SIGNIFICANT CHANGE UP (ref 3.8–10.5)
WBC # FLD AUTO: 6.92 K/UL — SIGNIFICANT CHANGE UP (ref 3.8–10.5)
WBC # FLD AUTO: 7.11 K/UL — SIGNIFICANT CHANGE UP (ref 3.8–10.5)

## 2021-08-09 PROCEDURE — 99232 SBSQ HOSP IP/OBS MODERATE 35: CPT | Mod: GC

## 2021-08-09 RX ORDER — AMLODIPINE BESYLATE 2.5 MG/1
10 TABLET ORAL DAILY
Refills: 0 | Status: DISCONTINUED | OUTPATIENT
Start: 2021-08-10 | End: 2021-08-12

## 2021-08-09 RX ORDER — LISINOPRIL 2.5 MG/1
30 TABLET ORAL DAILY
Refills: 0 | Status: DISCONTINUED | OUTPATIENT
Start: 2021-08-09 | End: 2021-08-09

## 2021-08-09 RX ORDER — LISINOPRIL 2.5 MG/1
30 TABLET ORAL DAILY
Refills: 0 | Status: DISCONTINUED | OUTPATIENT
Start: 2021-08-10 | End: 2021-08-12

## 2021-08-09 RX ADMIN — SENNA PLUS 2 TABLET(S): 8.6 TABLET ORAL at 22:02

## 2021-08-09 RX ADMIN — Medication 1 TABLET(S): at 13:42

## 2021-08-09 RX ADMIN — Medication 650 MILLIGRAM(S): at 21:14

## 2021-08-09 RX ADMIN — HEPARIN SODIUM 5000 UNIT(S): 5000 INJECTION INTRAVENOUS; SUBCUTANEOUS at 06:19

## 2021-08-09 RX ADMIN — Medication 650 MILLIGRAM(S): at 22:00

## 2021-08-09 RX ADMIN — ATORVASTATIN CALCIUM 20 MILLIGRAM(S): 80 TABLET, FILM COATED ORAL at 21:13

## 2021-08-09 RX ADMIN — AMIODARONE HYDROCHLORIDE 200 MILLIGRAM(S): 400 TABLET ORAL at 06:19

## 2021-08-09 RX ADMIN — POLYETHYLENE GLYCOL 3350 17 GRAM(S): 17 POWDER, FOR SOLUTION ORAL at 13:41

## 2021-08-09 RX ADMIN — LIDOCAINE 1 PATCH: 4 CREAM TOPICAL at 13:42

## 2021-08-09 RX ADMIN — Medication 125 MICROGRAM(S): at 06:19

## 2021-08-09 RX ADMIN — AMLODIPINE BESYLATE 5 MILLIGRAM(S): 2.5 TABLET ORAL at 06:19

## 2021-08-09 RX ADMIN — LISINOPRIL 20 MILLIGRAM(S): 2.5 TABLET ORAL at 06:23

## 2021-08-09 RX ADMIN — Medication 3 MILLIGRAM(S): at 21:13

## 2021-08-09 RX ADMIN — HEPARIN SODIUM 5000 UNIT(S): 5000 INJECTION INTRAVENOUS; SUBCUTANEOUS at 17:26

## 2021-08-09 NOTE — PROGRESS NOTE ADULT - SUBJECTIVE AND OBJECTIVE BOX
Javier Howardisabel, PGY1    DATE OF SERVICE: 21 @ 07:54    Patient is a 89y old  Female who presents with a chief complaint of back pain x 2 weeks (08 Aug 2021 19:48)      SUBJECTIVE / OVERNIGHT EVENTS:    MEDICATIONS  (STANDING):  aMIOdarone    Tablet 200 milliGRAM(s) Oral daily  amLODIPine   Tablet 5 milliGRAM(s) Oral daily  atorvastatin 20 milliGRAM(s) Oral at bedtime  calcium carbonate 1250 mG  + Vitamin D (OsCal 500 + D) 1 Tablet(s) Oral daily  heparin   Injectable 5000 Unit(s) SubCutaneous every 12 hours  levothyroxine 125 MICROGram(s) Oral daily  lidocaine   4% Patch 1 Patch Transdermal daily  lisinopril 20 milliGRAM(s) Oral daily  polyethylene glycol 3350 17 Gram(s) Oral daily  propranolol 40 milliGRAM(s) Oral two times a day  senna 2 Tablet(s) Oral at bedtime    MEDICATIONS  (PRN):  acetaminophen   Tablet .. 650 milliGRAM(s) Oral every 6 hours PRN Temp greater or equal to 38.5C (101.3F), Mild Pain (1 - 3)  melatonin 3 milliGRAM(s) Oral at bedtime PRN Insomnia  morphine  - Injectable 2 milliGRAM(s) IV Push every 6 hours PRN Severe Pain (7 - 10)  oxyCODONE    IR 5 milliGRAM(s) Oral every 4 hours PRN Moderate Pain (4 - 6)      Vital Signs Last 24 Hrs  T(C): 36.7 (09 Aug 2021 04:25), Max: 36.9 (08 Aug 2021 14:44)  T(F): 98 (09 Aug 2021 04:25), Max: 98.4 (08 Aug 2021 14:44)  HR: 58 (09 Aug 2021 04:25) (55 - 63)  BP: 139/78 (09 Aug 2021 04:25) (139/78 - 184/94)  BP(mean): --  RR: 16 (09 Aug 2021 04:25) (16 - 18)  SpO2: 94% (09 Aug 2021 04:25) (94% - 95%)  CAPILLARY BLOOD GLUCOSE        I&O's Summary    08 Aug 2021 07:01  -  09 Aug 2021 07:00  --------------------------------------------------------  IN: 0 mL / OUT: 500 mL / NET: -500 mL        PHYSICAL EXAM:  GENERAL: NAD, well-developed  HEAD:  Atraumatic, Normocephalic  EYES: EOMI, PERRLA, conjunctiva and sclera clear  NECK: Supple, No JVD  CHEST/LUNG: Clear to auscultation bilaterally; No wheeze  HEART: Regular rate and rhythm; No murmurs, rubs, or gallops  ABDOMEN: Soft, Nontender, Nondistended; Bowel sounds present  EXTREMITIES:  2+ Peripheral Pulses, No clubbing, cyanosis, or edema  PSYCH: AAOx3  NEUROLOGY: non-focal  SKIN: No rashes or lesions    LABS:                        12.4   6.92  )-----------( 291      ( 09 Aug 2021 07:10 )             37.3     08-09    141  |  101  |  12  ----------------------------<  89  3.6   |  26  |  0.81    Ca    9.1      09 Aug 2021 07:09  Phos  2.6     08-09  Mg     1.9     08-09    TPro  7.3  /  Alb  3.6  /  TBili  0.4  /  DBili  x   /  AST  23  /  ALT  14  /  AlkPhos  87  08-09          Urinalysis Basic - ( 07 Aug 2021 21:27 )    Color: Yellow / Appearance: Slightly Turbid / S.012 / pH: x  Gluc: x / Ketone: Negative  / Bili: Negative / Urobili: Negative   Blood: x / Protein: Trace / Nitrite: Negative   Leuk Esterase: Large / RBC: 15 /hpf / WBC 20 /HPF   Sq Epi: x / Non Sq Epi: 0 /hpf / Bacteria: Many        RADIOLOGY & ADDITIONAL TESTS:    Imaging Personally Reviewed:    Consultant(s) Notes Reviewed:      Care Discussed with Consultants/Other Providers:   Javier Brown, PGY1    DATE OF SERVICE: 21 @ 07:54    Patient is a 89y old  Female who presents with a chief complaint of back pain x 2 weeks (08 Aug 2021 19:48)      SUBJECTIVE / OVERNIGHT EVENTS: Patient had no acute events overnight. This morning, she was sleepy but doing well. Her back does not hurt if she just lays in bed, but if she moves the pain can be severe.     Spoke with daughter and patient at bedside who confirmed that patient would like to go ahead with the kyphoplasty as it helps with her severe back pain. Both in agreement with scheduling MRI before the IR procedural kyphoplasty.     MEDICATIONS  (STANDING):  aMIOdarone    Tablet 200 milliGRAM(s) Oral daily  amLODIPine   Tablet 5 milliGRAM(s) Oral daily  atorvastatin 20 milliGRAM(s) Oral at bedtime  calcium carbonate 1250 mG  + Vitamin D (OsCal 500 + D) 1 Tablet(s) Oral daily  heparin   Injectable 5000 Unit(s) SubCutaneous every 12 hours  levothyroxine 125 MICROGram(s) Oral daily  lidocaine   4% Patch 1 Patch Transdermal daily  lisinopril 20 milliGRAM(s) Oral daily  polyethylene glycol 3350 17 Gram(s) Oral daily  propranolol 40 milliGRAM(s) Oral two times a day  senna 2 Tablet(s) Oral at bedtime    MEDICATIONS  (PRN):  acetaminophen   Tablet .. 650 milliGRAM(s) Oral every 6 hours PRN Temp greater or equal to 38.5C (101.3F), Mild Pain (1 - 3)  melatonin 3 milliGRAM(s) Oral at bedtime PRN Insomnia  morphine  - Injectable 2 milliGRAM(s) IV Push every 6 hours PRN Severe Pain (7 - 10)  oxyCODONE    IR 5 milliGRAM(s) Oral every 4 hours PRN Moderate Pain (4 - 6)      Vital Signs Last 24 Hrs  T(C): 36.7 (09 Aug 2021 04:25), Max: 36.9 (08 Aug 2021 14:44)  T(F): 98 (09 Aug 2021 04:25), Max: 98.4 (08 Aug 2021 14:44)  HR: 58 (09 Aug 2021 04:25) (55 - 63)  BP: 139/78 (09 Aug 2021 04:25) (139/78 - 184/94)  BP(mean): --  RR: 16 (09 Aug 2021 04:25) (16 - 18)  SpO2: 94% (09 Aug 2021 04:25) (94% - 95%)  CAPILLARY BLOOD GLUCOSE        I&O's Summary    08 Aug 2021 07:01  -  09 Aug 2021 07:00  --------------------------------------------------------  IN: 0 mL / OUT: 500 mL / NET: -500 mL      PHYSICAL EXAM  CONSTITUTIONAL: NAD, well-developed, well-groomed, laying comfortably in bed  EYES: PERRLA; conjunctiva and sclera clear  ENMT: Moist oral mucosa, no pharyngeal injection or exudates; normal dentition  NECK: Supple, no palpable masses; no thyromegaly  RESPIRATORY: Normal respiratory effort; lungs are clear to auscultation bilaterally  CARDIOVASCULAR: Regular rate and rhythm, normal S1 and S2, no murmur/rub/gallop; No lower extremity edema; Peripheral pulses are 2+ bilaterally  ABDOMEN: Nontender to palpation, normoactive bowel sounds, no rebound/guarding; No hepatosplenomegaly  MUSCULOSKELETAL: no clubbing or cyanosis of digits; no joint swelling or tenderness to palpation; midthoracic spinal tenderness   PSYCH: A+O to person, place, and time; affect appropriate  NEUROLOGY: CN 2-12 are intact and symmetric; no gross sensory deficits     LABS:                        12.4   6.92  )-----------( 291      ( 09 Aug 2021 07:10 )             37.3     08-09    141  |  101  |  12  ----------------------------<  89  3.6   |  26  |  0.81    Ca    9.1      09 Aug 2021 07:09  Phos  2.6     08-09  Mg     1.9     08-    TPro  7.3  /  Alb  3.6  /  TBili  0.4  /  DBili  x   /  AST  23  /  ALT  14  /  AlkPhos  87  08-09          Urinalysis Basic - ( 07 Aug 2021 21:27 )    Color: Yellow / Appearance: Slightly Turbid / S.012 / pH: x  Gluc: x / Ketone: Negative  / Bili: Negative / Urobili: Negative   Blood: x / Protein: Trace / Nitrite: Negative   Leuk Esterase: Large / RBC: 15 /hpf / WBC 20 /HPF   Sq Epi: x / Non Sq Epi: 0 /hpf / Bacteria: Many        RADIOLOGY & ADDITIONAL TESTS:    Imaging Personally Reviewed:    Consultant(s) Notes Reviewed:      Care Discussed with Consultants/Other Providers:   Javier Brown, PGY1    DATE OF SERVICE: 21 @ 07:54    Patient is a 89y old  Female who presents with a chief complaint of back pain x 2 weeks (08 Aug 2021 19:48)      SUBJECTIVE / OVERNIGHT EVENTS: Patient had no acute events overnight. This morning, she was sleepy but doing well. Her back does not hurt if she just lays in bed, but if she moves the pain can be severe.     Spoke with daughter and patient at bedside who confirmed that patient would like to go ahead with the kyphoplasty as it helps with her severe back pain. Both in agreement with scheduling MRI before the IR procedural kyphoplasty.     MEDICATIONS  (STANDING):  aMIOdarone    Tablet 200 milliGRAM(s) Oral daily  amLODIPine   Tablet 5 milliGRAM(s) Oral daily  atorvastatin 20 milliGRAM(s) Oral at bedtime  calcium carbonate 1250 mG  + Vitamin D (OsCal 500 + D) 1 Tablet(s) Oral daily  heparin   Injectable 5000 Unit(s) SubCutaneous every 12 hours  levothyroxine 125 MICROGram(s) Oral daily  lidocaine   4% Patch 1 Patch Transdermal daily  lisinopril 20 milliGRAM(s) Oral daily  polyethylene glycol 3350 17 Gram(s) Oral daily  propranolol 40 milliGRAM(s) Oral two times a day  senna 2 Tablet(s) Oral at bedtime    MEDICATIONS  (PRN):  acetaminophen   Tablet .. 650 milliGRAM(s) Oral every 6 hours PRN Temp greater or equal to 38.5C (101.3F), Mild Pain (1 - 3)  melatonin 3 milliGRAM(s) Oral at bedtime PRN Insomnia  morphine  - Injectable 2 milliGRAM(s) IV Push every 6 hours PRN Severe Pain (7 - 10)  oxyCODONE    IR 5 milliGRAM(s) Oral every 4 hours PRN Moderate Pain (4 - 6)      Vital Signs Last 24 Hrs  T(C): 36.7 (09 Aug 2021 04:25), Max: 36.9 (08 Aug 2021 14:44)  T(F): 98 (09 Aug 2021 04:25), Max: 98.4 (08 Aug 2021 14:44)  HR: 58 (09 Aug 2021 04:25) (55 - 63)  BP: 139/78 (09 Aug 2021 04:25) (139/78 - 184/94)  BP(mean): --  RR: 16 (09 Aug 2021 04:25) (16 - 18)  SpO2: 94% (09 Aug 2021 04:25) (94% - 95%)  CAPILLARY BLOOD GLUCOSE        I&O's Summary    08 Aug 2021 07:01  -  09 Aug 2021 07:00  --------------------------------------------------------  IN: 0 mL / OUT: 500 mL / NET: -500 mL      PHYSICAL EXAM  CONSTITUTIONAL: NAD, well-developed, well-groomed, laying comfortably in bed  EYES: PERRLA; conjunctiva and sclera clear  ENMT: Moist oral mucosa, no pharyngeal injection or exudates; normal dentition  NECK: Supple, no palpable masses; no thyromegaly  RESPIRATORY: Normal respiratory effort; lungs are clear to auscultation bilaterally  CARDIOVASCULAR: Regular rate and rhythm, normal S1 and S2, no murmur/rub/gallop; No lower extremity edema; Peripheral pulses are 2+ bilaterally  ABDOMEN: Nontender to palpation, normoactive bowel sounds, no rebound/guarding; No hepatosplenomegaly  MUSCULOSKELETAL: no clubbing or cyanosis of digits; no joint swelling; midthoracic spinal tenderness   PSYCH: A+O to person, place, and time; affect appropriate  NEUROLOGY: CN 2-12 are intact and symmetric; no gross sensory deficits     LABS:                        12.4   6.92  )-----------( 291      ( 09 Aug 2021 07:10 )             37.3     08-    141  |  101  |  12  ----------------------------<  89  3.6   |  26  |  0.81    Ca    9.1      09 Aug 2021 07:09  Phos  2.6     08-09  Mg     1.9     08-09    TPro  7.3  /  Alb  3.6  /  TBili  0.4  /  DBili  x   /  AST  23  /  ALT  14  /  AlkPhos  87  08-09          Urinalysis Basic - ( 07 Aug 2021 21:27 )    Color: Yellow / Appearance: Slightly Turbid / S.012 / pH: x  Gluc: x / Ketone: Negative  / Bili: Negative / Urobili: Negative   Blood: x / Protein: Trace / Nitrite: Negative   Leuk Esterase: Large / RBC: 15 /hpf / WBC 20 /HPF   Sq Epi: x / Non Sq Epi: 0 /hpf / Bacteria: Many        RADIOLOGY & ADDITIONAL TESTS:    Imaging Personally Reviewed:    Consultant(s) Notes Reviewed:      Care Discussed with Consultants/Other Providers:

## 2021-08-09 NOTE — DISCHARGE NOTE PROVIDER - NSDCFUADDAPPT_GEN_ALL_CORE_FT
1. Please be sure to follow-up with your PCP in 2 weeks to monitor the progression of your symptoms.  2. Please be sure to attend you Interventional Radiology (IR) appointment on 8/18/2021 at [facility] [address] to have the kyphoplasty procedure performed.   - Please call the IR booking office at [phone] to confirm appointment and time.  ** A COVID-19 testing appointment has already been made for 8/15/21 at 2:50pm at [facility] (Entrance #3) COVID-19 testing drive-through.

## 2021-08-09 NOTE — DISCHARGE NOTE PROVIDER - HOSPITAL COURSE
89 year old female with PMH of TIA, Htn, Thyroid disease, GERD, multiple falls with vertebral fractures, presents for worsening back pain after recent fall.    #Back Pain  Patient presented with worsening back pain after a recent fall. Pain was treated symptomatically with morphine and tylenol. She was evaluated by Interventional Radiology for possible kyphoplasty. MRI Thoracic and Lumbar spine were obtained and showed "Mild to moderate acute compression deformity of T9 with mild bony retropulsion. Abnormal signal involving the T9 vertebral body consistent with fracture." IR recommend kyphoplasty on Wednesday, August 18th. Patient and her daughter agreed that coming back for procedure as outpatient would be best. Patient will follow up as outpatient for the kyphoplasty.    All other chronic medical problems were treated with home medications. Patient was hemodynamically stable at the time of discharge. She will follow up with IR for the kyphoplasty. 89 year old female with PMH of TIA, Htn, Thyroid disease, GERD, multiple falls with vertebral fractures, presents for worsening back pain after recent fall.    #Back Pain  Patient presented with worsening back pain after a recent fall. Pain was treated symptomatically with morphine and tylenol. She was evaluated by Interventional Radiology for possible kyphoplasty. MRI Thoracic and Lumbar spine were obtained and showed "Mild to moderate acute compression deformity of T9 with mild bony retropulsion. Abnormal signal involving the T9 vertebral body consistent with fracture." IR recommend kyphoplasty on Wednesday, August 18th. Patient and her daughter agreed that coming back for procedure as outpatient would be best. Patient was seen by the orthotist and given a TLSO brace. Patient will follow up as outpatient for the kyphoplasty.    All other chronic medical problems were treated with home medications (BP meds titrated). Patient was stable at the time of discharge. She will follow up with IR for the kyphoplasty as well as PCP. 89 year old female with PMH of TIA, Htn, Thyroid disease, GERD, multiple falls with vertebral fractures, presents for worsening back pain after recent fall.    #Back Pain  Patient presented with worsening back pain after a recent fall. Pain was treated symptomatically with morphine and tylenol. She was evaluated by Interventional Radiology for possible kyphoplasty. MRI Thoracic and Lumbar spine were obtained and showed "Mild to moderate acute compression deformity of T9 with mild bony retropulsion. Abnormal signal involving the T9 vertebral body consistent with fracture." IR recommend kyphoplasty on Wednesday, August 18th. Patient and her daughter agreed that coming back for procedure as outpatient would be best. Patient was seen by the orthotist and given a TLSO brace. Patient will follow up as outpatient for the kyphoplasty.    All other chronic medical problems were treated with home medications. Patient was stable at the time of discharge. She will follow up with IR for the kyphoplasty as well as PCP.

## 2021-08-09 NOTE — DISCHARGE NOTE PROVIDER - NSDCFUSCHEDAPPT_GEN_ALL_CORE_FT
BASSEN, PHYLLIS ; 10/25/2021 ; [facility] [address] [place] [address] BASSEN, PHYLLIS ; 08/18/2021 ; [facility]  BASSEN, PHYLLIS ; 10/25/2021 ; [facility] [address] [place] [address] BASSEN, PHYLLIS ; 08/15/2021 ; [facility]  BASSEN, PHYLLIS ; 08/18/2021 ; [facility]  BASSEN, PHYLLIS ; 10/25/2021 ; [facility] [address] [place] [address]

## 2021-08-09 NOTE — DISCHARGE NOTE PROVIDER - DETAILS OF MALNUTRITION DIAGNOSIS/DIAGNOSES
This patient has been assessed with a concern for Malnutrition and was treated during this hospitalization for the following Nutrition diagnosis/diagnoses:     -  08/10/2021: Moderate protein-calorie malnutrition

## 2021-08-09 NOTE — DISCHARGE NOTE PROVIDER - CARE PROVIDER_API CALL
Maria Eugenia Villaseñor)  Geriatric Medicine; Internal Medicine  [address] [place] [address], [facility] [address]  [place] [address]  Phone: [phone]  Fax: [phone]  Established Patient  Follow Up Time: 2 weeks

## 2021-08-09 NOTE — DISCHARGE NOTE PROVIDER - NSDCMRMEDTOKEN_GEN_ALL_CORE_FT
acetaminophen 325 mg oral tablet: 2 tab(s) orally every 6 hours, As Needed -for moderate pain   alendronate 70 mg oral tablet: 1 tab(s) orally once a week  amiodarone 200 mg oral tablet: 1 tab(s) orally once a day  amLODIPine 5 mg oral tablet: 1 tab(s) orally once a day  atorvastatin 20 mg oral tablet: 1 tab(s) orally once a day  Calcium 500+D oral tablet, chewable: 1 tab(s) orally once a day  Colace Glycerin adult rectal suppository: 1 suppository(ies) rectal every other day  levothyroxine 125 mcg (0.125 mg) oral tablet: 1 tab(s) orally once a day  lidocaine 4% topical film: Apply topically to affected area once a day  lisinopril 40 mg oral tablet: 1 tab(s) orally once a day  omeprazole 40 mg oral delayed release capsule: 1 cap(s) orally once a day, As Needed  oxyCODONE 5 mg oral tablet: 1 tab(s) orally 3 times a day    Note:Last filled in july for 10 day supply  Probiotic Formula: 1 cap(s) orally once a day  propranolol 40 mg oral tablet: 1 tab(s) orally 2 times a day at 10:00 am and 10:00 pm   alendronate 70 mg oral tablet: 1 tab(s) orally once a week  amiodarone 200 mg oral tablet: 1 tab(s) orally once a day  amLODIPine 5 mg oral tablet: 1 tab(s) orally once a day  atorvastatin 20 mg oral tablet: 1 tab(s) orally once a day  Calcium 500+D oral tablet, chewable: 1 tab(s) orally once a day  Colace Glycerin adult rectal suppository: 1 suppository(ies) rectal every other day  levothyroxine 125 mcg (0.125 mg) oral tablet: 1 tab(s) orally once a day  lidocaine 4% topical film: Apply topically to affected area once a day  lisinopril 40 mg oral tablet: 1 tab(s) orally once a day  Probiotic Formula: 1 cap(s) orally once a day  propranolol 40 mg oral tablet: 1 tab(s) orally 2 times a day at 10:00 am and 10:00 pm   acetaminophen 650 mg oral tablet, extended release: 2 tab(s) orally every 8 hours, As Needed -for moderate pain or severe pain.  alendronate 70 mg oral tablet: 1 tab(s) orally once a week  amiodarone 200 mg oral tablet: 1 tab(s) orally once a day  amLODIPine 5 mg oral tablet: 1 tab(s) orally once a day  atorvastatin 20 mg oral tablet: 1 tab(s) orally once a day  Calcium 500+D oral tablet, chewable: 1 tab(s) orally once a day  Colace Glycerin adult rectal suppository: 1 suppository(ies) rectal every other day  levothyroxine 125 mcg (0.125 mg) oral tablet: 1 tab(s) orally once a day  lidocaine 4% topical film: Apply topically to affected area once a day  lisinopril 40 mg oral tablet: 1 tab(s) orally once a day  Multiple Vitamins oral tablet: 1 tab(s) orally once a day   polyethylene glycol 3350 oral powder for reconstitution: 17 gram(s) orally once a day, As Needed -for constipation   Probiotic Formula: 1 cap(s) orally once a day  propranolol 40 mg oral tablet: 1 tab(s) orally 2 times a day at 10:00 am and 10:00 pm  TLSO Brace: TLSO Brace    ICD10: S22.000A  T9 Compression Fracture   acetaminophen 650 mg oral tablet, extended release: 2 tab(s) orally every 8 hours, As Needed -for moderate pain or severe pain.  alendronate 70 mg oral tablet: 1 tab(s) orally once a week  amiodarone 200 mg oral tablet: 1 tab(s) orally once a day  amLODIPine 5 mg oral tablet: 1 tab(s) orally once a day  atorvastatin 20 mg oral tablet: 1 tab(s) orally once a day  Calcium 500+D oral tablet, chewable: 1 tab(s) orally once a day  Colace Glycerin adult rectal suppository: 1 suppository(ies) rectal every other day, As Needed  levothyroxine 125 mcg (0.125 mg) oral tablet: 1 tab(s) orally once a day  lidocaine 4% topical film: Apply topically to affected area once a day  lisinopril 40 mg oral tablet: 1 tab(s) orally once a day  Multiple Vitamins oral tablet: 1 tab(s) orally once a day   Probiotic Formula: 1 cap(s) orally once a day  propranolol 40 mg oral tablet: 1 tab(s) orally 2 times a day at 10:00 am and 10:00 pm  TLSO Brace: TLSO Brace    ICD10: S22.000A  T9 Compression Fracture

## 2021-08-09 NOTE — DISCHARGE NOTE PROVIDER - NSDCCPCAREPLAN_GEN_ALL_CORE_FT
PRINCIPAL DISCHARGE DIAGNOSIS  Diagnosis: Compression fracture of body of thoracic vertebra  Assessment and Plan of Treatment: You came to the hospital due to back pain after recent falls. You were found to have an acute compression fracture of the thoracic spine (vertebra #9) on an MRI, along with prior old compression fractures. You were evaluated by the interventional radiologists, who recommended a kyphoplasty procedure, to stabilize the fracture and help to reduce the back pain.  You were also seen by the orthotist, who provided you with a TLSO back brace to help stabilize the spine prior to the procedure.  - Please see attached for the IR INTERVENTIONAL RADIOLOGY appointment information.      SECONDARY DISCHARGE DIAGNOSES  Diagnosis: Hypertension  Assessment and Plan of Treatment: Your blood pressure was not well controlled (upper number in 150s and 160's. Your blood pressures were increased to amlodipine 10 mg and lisinopril 30 mg. Please follow-up with your PCP for further management of your blood pressure.     PRINCIPAL DISCHARGE DIAGNOSIS  Diagnosis: Compression fracture of body of thoracic vertebra  Assessment and Plan of Treatment: You came to the hospital due to back pain after recent falls. You were found to have an acute compression fracture of the thoracic spine (vertebra #9) on an MRI, along with prior old compression fractures. You were evaluated by the interventional radiologists, who recommended a kyphoplasty procedure, to stabilize the fracture and help to reduce the back pain.  You were also seen by the orthotist, who provided you with a TLSO back brace to help stabilize the spine prior to the procedure.  - Please see attached for the IR INTERVENTIONAL RADIOLOGY appointment information.

## 2021-08-09 NOTE — DISCHARGE NOTE PROVIDER - NSDCCPTREATMENT_GEN_ALL_CORE_FT
PRINCIPAL PROCEDURE  Procedure: MRI thoracic spine  Findings and Treatment: 8/11/21  Evaluation of the thoracic spine demonstrates old marked compression fractures with normal signal involving the T3 and T8 vertebral bodies. There is an acute mild to moderate compression deformity of T9 with abnormal low signal intensity in the T1-weighted images involving the marrow with mild bony retropulsion. The posterior elements are spared. No other regions of abnormal signal are identified. This could represent a benign or malignant compression deformity. There is no cord compression. The thoracic cord is normal in size, contour, and signal characteristics. The conus terminates normally at the T12-L1 level.  Evaluation of the lumbar spine demonstrates an old moderate compression deformity of L1 with low signal cement from kyphoplasty involving the superior endplate. There are mild old compression deformities of L2 and L3. Degenerative disc disease is noted at multiple levels. There is mild narrowing of the ventral canal at the T12-L1 level due to the posterior superior corner of the L1 vertebral body. There is a disc osteophyte complex at L2-3 with mild to moderate degenerative spinal stenosis. There is a small disc herniation at L4-5 without significant thecal sac compression.  There is a small right pleural effusion.  IMPRESSION: Mild to moderate acute compression deformity of T9 with mild bony retropulsion. Abnormal signal involving the T9 vertebral body consistent with fracture. Cannot differentiate benign from pathologic compression fracture. No posterior element involvement. Old compression deformities T3 T8 and L1. No cord or cauda equina compression.

## 2021-08-09 NOTE — PROGRESS NOTE ADULT - PROBLEM SELECTOR PLAN 1
no neurologic deficits , reported multiple vertebral fractures   -  CT T and L spine --> < from: CT Lumbar Spine No Cont (08.07.21 @ 21:20) > Multilevel age-indeterminate compression deformities. Diffuse osteopenia advanced multilevel spondylosis. Small bilateral pleural effusions with overlying compressive atelectasis. Bilateral renal and hepatic cysts. < end of copied text >  - tylenol PRN for mild pain  - oxycodone IR PRN for moderate pain  - IV morphine PRN for severe pain   - Senna, miralax   - Fall precautions, ambulate with assistance  - PT  - will get IR eval to kyphoplasty for stabilization of compression fracture to help with pain.  - continue Seroquel no neurologic deficits , reported multiple vertebral fractures   -  CT T and L spine --> < from: CT Lumbar Spine No Cont (08.07.21 @ 21:20) > Multilevel age-indeterminate compression deformities. Diffuse osteopenia advanced multilevel spondylosis. Small bilateral pleural effusions with overlying compressive atelectasis. Bilateral renal and hepatic cysts. < end of copied text >  - tylenol PRN for mild pain  - oxycodone IR PRN for moderate pain  - IV morphine PRN for severe pain   - Senna, miralax   - Fall precautions, ambulate with assistance  - PT  - IR recommends MRI thoracic and lumbar spine, ordered 8/9

## 2021-08-09 NOTE — PROGRESS NOTE ADULT - NSPROGADDITIONALINFOA_GEN_ALL_CORE
89 year old female with PMH of TIA, Htn, Thyroid disease, GERD, multiple falls with vertebral fractures, presents for worsening back pain after recent fall.   -  CT T and L spine --> < from: CT Lumbar Spine No Cont (08.07.21 @ 21:20) > Multilevel age-indeterminate compression deformities. Diffuse osteopenia advanced multilevel spondylosis. Small bilateral pleural effusions with overlying compressive atelectasis. Bilateral renal and hepatic cysts. < end of copied text >  - tylenol PRN for mild pain  - oxycodone IR PRN for moderate pain  - IV morphine PRN for severe pain   - Senna, miralax   - Fall precautions, ambulate with assistance  - PT  - IR eval fo kyphoplasty --> they recommends MRI thoracic and lumbar spine  plan d/w daughter at  bedside

## 2021-08-10 ENCOUNTER — APPOINTMENT (OUTPATIENT)
Dept: OPHTHALMOLOGY | Facility: CLINIC | Age: 86
End: 2021-08-10

## 2021-08-10 PROCEDURE — 99232 SBSQ HOSP IP/OBS MODERATE 35: CPT | Mod: GC

## 2021-08-10 RX ORDER — ACETAMINOPHEN 500 MG
1000 TABLET ORAL EVERY 8 HOURS
Refills: 0 | Status: DISCONTINUED | OUTPATIENT
Start: 2021-08-10 | End: 2021-08-12

## 2021-08-10 RX ADMIN — Medication 1000 MILLIGRAM(S): at 21:53

## 2021-08-10 RX ADMIN — AMIODARONE HYDROCHLORIDE 200 MILLIGRAM(S): 400 TABLET ORAL at 05:42

## 2021-08-10 RX ADMIN — HEPARIN SODIUM 5000 UNIT(S): 5000 INJECTION INTRAVENOUS; SUBCUTANEOUS at 05:41

## 2021-08-10 RX ADMIN — ATORVASTATIN CALCIUM 20 MILLIGRAM(S): 80 TABLET, FILM COATED ORAL at 21:54

## 2021-08-10 RX ADMIN — Medication 3 MILLIGRAM(S): at 21:51

## 2021-08-10 RX ADMIN — Medication 1000 MILLIGRAM(S): at 22:30

## 2021-08-10 RX ADMIN — Medication 1 TABLET(S): at 18:40

## 2021-08-10 RX ADMIN — LIDOCAINE 1 PATCH: 4 CREAM TOPICAL at 13:57

## 2021-08-10 RX ADMIN — POLYETHYLENE GLYCOL 3350 17 GRAM(S): 17 POWDER, FOR SOLUTION ORAL at 14:00

## 2021-08-10 RX ADMIN — AMLODIPINE BESYLATE 10 MILLIGRAM(S): 2.5 TABLET ORAL at 05:44

## 2021-08-10 RX ADMIN — Medication 1 TABLET(S): at 14:02

## 2021-08-10 RX ADMIN — LISINOPRIL 30 MILLIGRAM(S): 2.5 TABLET ORAL at 05:42

## 2021-08-10 RX ADMIN — SENNA PLUS 2 TABLET(S): 8.6 TABLET ORAL at 21:56

## 2021-08-10 RX ADMIN — HEPARIN SODIUM 5000 UNIT(S): 5000 INJECTION INTRAVENOUS; SUBCUTANEOUS at 18:40

## 2021-08-10 RX ADMIN — Medication 125 MICROGRAM(S): at 05:41

## 2021-08-10 NOTE — DIETITIAN INITIAL EVALUATION ADULT. - REASON
son declined; observed with mild muscle wasting to temples, fat wasting to orbital region - some losses likely age related

## 2021-08-10 NOTE — DIETITIAN INITIAL EVALUATION ADULT. - REASON INDICATOR FOR ASSESSMENT
Consult received for unintentional wt loss PTA. Source: EMR, pt, pt's son at bedside. Pt is a 89 yo female with PMH of TIA, HTN, thyroid disease, GERD, and multiple falls with vertebral fractures who presented for worsening back pain after recent fall. Admitted 8/8.

## 2021-08-10 NOTE — PROGRESS NOTE ADULT - PROBLEM SELECTOR PLAN 1
no neurologic deficits , reported multiple vertebral fractures   -  CT T and L spine --> < from: CT Lumbar Spine No Cont (08.07.21 @ 21:20) > Multilevel age-indeterminate compression deformities. Diffuse osteopenia advanced multilevel spondylosis. Small bilateral pleural effusions with overlying compressive atelectasis. Bilateral renal and hepatic cysts. < end of copied text >  - tylenol PRN for mild pain  - oxycodone IR PRN for moderate pain  - IV morphine PRN for severe pain   - Senna, miralax   - Fall precautions, ambulate with assistance  - PT  - IR recommends MRI thoracic and lumbar spine, ordered 8/9

## 2021-08-10 NOTE — PROGRESS NOTE ADULT - SUBJECTIVE AND OBJECTIVE BOX
Javier Brown, PGY1    DATE OF SERVICE: 08-10-21 @ 08:10    Patient is a 89y old  Female who presents with a chief complaint of back pain x 2 weeks (09 Aug 2021 17:27)      SUBJECTIVE / OVERNIGHT EVENTS:    MEDICATIONS  (STANDING):  aMIOdarone    Tablet 200 milliGRAM(s) Oral daily  amLODIPine   Tablet 10 milliGRAM(s) Oral daily  atorvastatin 20 milliGRAM(s) Oral at bedtime  calcium carbonate 1250 mG  + Vitamin D (OsCal 500 + D) 1 Tablet(s) Oral daily  heparin   Injectable 5000 Unit(s) SubCutaneous every 12 hours  levothyroxine 125 MICROGram(s) Oral daily  lidocaine   4% Patch 1 Patch Transdermal daily  lisinopril 30 milliGRAM(s) Oral daily  polyethylene glycol 3350 17 Gram(s) Oral daily  propranolol 40 milliGRAM(s) Oral two times a day  senna 2 Tablet(s) Oral at bedtime    MEDICATIONS  (PRN):  acetaminophen   Tablet .. 650 milliGRAM(s) Oral every 6 hours PRN Temp greater or equal to 38.5C (101.3F), Mild Pain (1 - 3)  melatonin 3 milliGRAM(s) Oral at bedtime PRN Insomnia  morphine  - Injectable 2 milliGRAM(s) IV Push every 6 hours PRN Severe Pain (7 - 10)  oxyCODONE    IR 5 milliGRAM(s) Oral every 4 hours PRN Moderate Pain (4 - 6)      Vital Signs Last 24 Hrs  T(C): 36.6 (10 Aug 2021 04:49), Max: 36.8 (09 Aug 2021 13:26)  T(F): 97.8 (10 Aug 2021 04:49), Max: 98.3 (09 Aug 2021 13:26)  HR: 51 (10 Aug 2021 04:49) (51 - 65)  BP: 137/71 (10 Aug 2021 04:49) (130/71 - 168/85)  BP(mean): --  RR: 18 (10 Aug 2021 04:49) (18 - 18)  SpO2: 94% (10 Aug 2021 04:49) (93% - 96%)  CAPILLARY BLOOD GLUCOSE        I&O's Summary      PHYSICAL EXAM:  GENERAL: NAD, well-developed  HEAD:  Atraumatic, Normocephalic  EYES: EOMI, PERRLA, conjunctiva and sclera clear  NECK: Supple, No JVD  CHEST/LUNG: Clear to auscultation bilaterally; No wheeze  HEART: Regular rate and rhythm; No murmurs, rubs, or gallops  ABDOMEN: Soft, Nontender, Nondistended; Bowel sounds present  EXTREMITIES:  2+ Peripheral Pulses, No clubbing, cyanosis, or edema  PSYCH: AAOx3  NEUROLOGY: non-focal  SKIN: No rashes or lesions    LABS:                        12.4   6.92  )-----------( 291      ( 09 Aug 2021 07:10 )             37.3     08-09    141  |  101  |  12  ----------------------------<  89  3.6   |  26  |  0.81    Ca    9.1      09 Aug 2021 07:09  Phos  2.6     08-09  Mg     1.9     08-09    TPro  7.3  /  Alb  3.6  /  TBili  0.4  /  DBili  x   /  AST  23  /  ALT  14  /  AlkPhos  87  08-09              RADIOLOGY & ADDITIONAL TESTS:    Imaging Personally Reviewed:    Consultant(s) Notes Reviewed:      Care Discussed with Consultants/Other Providers:   Javier Brown, PGY1    DATE OF SERVICE: 08-10-21 @ 08:10    Patient is a 89y old  Female who presents with a chief complaint of back pain x 2 weeks (09 Aug 2021 17:27)      SUBJECTIVE / OVERNIGHT EVENTS: Patient had no acute events overnight. She was doing well this morning. She denied chest pain, head ache, shortness of breath, N/V, abdominal pain. She is just waiting for MRI. Her back pain felt control in bed and she did not require pain medications.     MEDICATIONS  (STANDING):  aMIOdarone    Tablet 200 milliGRAM(s) Oral daily  amLODIPine   Tablet 10 milliGRAM(s) Oral daily  atorvastatin 20 milliGRAM(s) Oral at bedtime  calcium carbonate 1250 mG  + Vitamin D (OsCal 500 + D) 1 Tablet(s) Oral daily  heparin   Injectable 5000 Unit(s) SubCutaneous every 12 hours  levothyroxine 125 MICROGram(s) Oral daily  lidocaine   4% Patch 1 Patch Transdermal daily  lisinopril 30 milliGRAM(s) Oral daily  polyethylene glycol 3350 17 Gram(s) Oral daily  propranolol 40 milliGRAM(s) Oral two times a day  senna 2 Tablet(s) Oral at bedtime    MEDICATIONS  (PRN):  acetaminophen   Tablet .. 650 milliGRAM(s) Oral every 6 hours PRN Temp greater or equal to 38.5C (101.3F), Mild Pain (1 - 3)  melatonin 3 milliGRAM(s) Oral at bedtime PRN Insomnia  morphine  - Injectable 2 milliGRAM(s) IV Push every 6 hours PRN Severe Pain (7 - 10)  oxyCODONE    IR 5 milliGRAM(s) Oral every 4 hours PRN Moderate Pain (4 - 6)      Vital Signs Last 24 Hrs  T(C): 36.6 (10 Aug 2021 04:49), Max: 36.8 (09 Aug 2021 13:26)  T(F): 97.8 (10 Aug 2021 04:49), Max: 98.3 (09 Aug 2021 13:26)  HR: 51 (10 Aug 2021 04:49) (51 - 65)  BP: 137/71 (10 Aug 2021 04:49) (130/71 - 168/85)  BP(mean): --  RR: 18 (10 Aug 2021 04:49) (18 - 18)  SpO2: 94% (10 Aug 2021 04:49) (93% - 96%)  CAPILLARY BLOOD GLUCOSE        I&O's Summary      PHYSICAL EXAM  CONSTITUTIONAL: NAD, well-developed, well-groomed, laying comfortably in bed  EYES: PERRLA; conjunctiva and sclera clear  ENMT: Moist oral mucosa, no pharyngeal injection or exudates; normal dentition  NECK: Supple, no palpable masses; no thyromegaly  RESPIRATORY: Normal respiratory effort; lungs are clear to auscultation bilaterally  CARDIOVASCULAR: Regular rate and rhythm, normal S1 and S2, no murmur/rub/gallop; No lower extremity edema; Peripheral pulses are 2+ bilaterally  ABDOMEN: Nontender to palpation, normoactive bowel sounds, no rebound/guarding; No hepatosplenomegaly  MUSCULOSKELETAL: no clubbing or cyanosis of digits; no joint swelling; midthoracic spinal tenderness   PSYCH: A+O to person, place, and time; affect appropriate  NEUROLOGY: CN 2-12 are intact and symmetric; no gross sensory deficits       LABS:                        12.4   6.92  )-----------( 291      ( 09 Aug 2021 07:10 )             37.3     08-09    141  |  101  |  12  ----------------------------<  89  3.6   |  26  |  0.81    Ca    9.1      09 Aug 2021 07:09  Phos  2.6     08-09  Mg     1.9     08-09    TPro  7.3  /  Alb  3.6  /  TBili  0.4  /  DBili  x   /  AST  23  /  ALT  14  /  AlkPhos  87  08-09              RADIOLOGY & ADDITIONAL TESTS:    Imaging Personally Reviewed:    Consultant(s) Notes Reviewed:      Care Discussed with Consultants/Other Providers:

## 2021-08-10 NOTE — DIETITIAN INITIAL EVALUATION ADULT. - ORAL INTAKE PTA/DIET HISTORY
Pt/son report pt with suboptimal PO intake PTA, reports pt "eats a little throughout the day." Pt denies vitamin/mineral or oral nutrition supplementation PTA.

## 2021-08-10 NOTE — DIETITIAN INITIAL EVALUATION ADULT. - OTHER INFO
Pt visited, son at bedside. Pt reports "eating fine," son states "she doesn't eat too much." Pt denies nausea/vomiting. Son reports pt with baseline constipation. Ordered for Miralax, senna. Pt reports last BM today. Pt denies difficulties chewing or swallowing. Confirmed NKFA.     Pt reports recent weight loss. Son reports wt loss ongoing x "a few months," pt reports "a little shorter." Pt/pt's son report UBW previously 139 pounds. [facility] per [facility] HIE (in pounds): 138 (9/8/2020), 137 (10/29/2020), 140 (4/30/2021), 141.6 (6/14/2021), 138 (7/17/2021), 138 (7/27/2021). Dosing weight this admission 130.5 pounds suggests ~5.4% wt loss x <1 month (7/27-8/8) - clinically significant. Will continue to monitor and trend weights.     Encouraged PO intake as tolerated. Pt with no food preferences at this time. Offered oral nutrition supplements to optimize intake - pt and son declined at this time. Stressed importance of adequate calorie and protein intake as able. Son dismissive of RD. Made aware RD remains available.

## 2021-08-10 NOTE — PROGRESS NOTE ADULT - ASSESSMENT
89 year old female with PMH of TIA, Htn, Thyroid disease, GERD, multiple falls with vertebral fractures, presents for worsening back pain after recent fall.    89 year old female with PMH of TIA, Htn, Thyroid disease, GERD, multiple falls with vertebral fractures, presents for worsening back pain after recent fall. Currently waiting for spine MRI prior to possible IR vertebroplasty.

## 2021-08-10 NOTE — DIETITIAN INITIAL EVALUATION ADULT. - ADD RECOMMEND
Consider multivitamin if no medical contraindications. Monitor PO intake, weight, labs, skin, GI status, diet. Malnutrition sticker placed, RD to follow-up as per protocol.

## 2021-08-11 PROCEDURE — 72146 MRI CHEST SPINE W/O DYE: CPT | Mod: 26

## 2021-08-11 PROCEDURE — 99232 SBSQ HOSP IP/OBS MODERATE 35: CPT | Mod: GC

## 2021-08-11 PROCEDURE — 72148 MRI LUMBAR SPINE W/O DYE: CPT | Mod: 26

## 2021-08-11 RX ORDER — POLYETHYLENE GLYCOL 3350 17 G/17G
17 POWDER, FOR SOLUTION ORAL
Qty: 119 | Refills: 0
Start: 2021-08-11 | End: 2021-08-17

## 2021-08-11 RX ORDER — OMEPRAZOLE 10 MG/1
1 CAPSULE, DELAYED RELEASE ORAL
Qty: 0 | Refills: 0 | DISCHARGE

## 2021-08-11 RX ORDER — ACETAMINOPHEN 500 MG
2 TABLET ORAL
Qty: 42 | Refills: 0
Start: 2021-08-11 | End: 2021-08-17

## 2021-08-11 RX ORDER — OXYCODONE HYDROCHLORIDE 5 MG/1
1 TABLET ORAL
Qty: 0 | Refills: 0 | DISCHARGE

## 2021-08-11 RX ADMIN — Medication 3 MILLIGRAM(S): at 22:05

## 2021-08-11 RX ADMIN — Medication 1000 MILLIGRAM(S): at 14:05

## 2021-08-11 RX ADMIN — LIDOCAINE 1 PATCH: 4 CREAM TOPICAL at 11:58

## 2021-08-11 RX ADMIN — LIDOCAINE 1 PATCH: 4 CREAM TOPICAL at 18:06

## 2021-08-11 RX ADMIN — HEPARIN SODIUM 5000 UNIT(S): 5000 INJECTION INTRAVENOUS; SUBCUTANEOUS at 17:58

## 2021-08-11 RX ADMIN — Medication 1 TABLET(S): at 11:58

## 2021-08-11 RX ADMIN — Medication 1 TABLET(S): at 11:59

## 2021-08-11 RX ADMIN — Medication 1000 MILLIGRAM(S): at 06:06

## 2021-08-11 RX ADMIN — LIDOCAINE 1 PATCH: 4 CREAM TOPICAL at 01:54

## 2021-08-11 RX ADMIN — POLYETHYLENE GLYCOL 3350 17 GRAM(S): 17 POWDER, FOR SOLUTION ORAL at 11:57

## 2021-08-11 RX ADMIN — Medication 125 MICROGRAM(S): at 05:59

## 2021-08-11 RX ADMIN — LISINOPRIL 30 MILLIGRAM(S): 2.5 TABLET ORAL at 06:00

## 2021-08-11 RX ADMIN — AMIODARONE HYDROCHLORIDE 200 MILLIGRAM(S): 400 TABLET ORAL at 05:59

## 2021-08-11 RX ADMIN — HEPARIN SODIUM 5000 UNIT(S): 5000 INJECTION INTRAVENOUS; SUBCUTANEOUS at 05:59

## 2021-08-11 RX ADMIN — Medication 0.5 MILLIGRAM(S): at 08:45

## 2021-08-11 RX ADMIN — AMLODIPINE BESYLATE 10 MILLIGRAM(S): 2.5 TABLET ORAL at 05:59

## 2021-08-11 RX ADMIN — Medication 1000 MILLIGRAM(S): at 22:18

## 2021-08-11 RX ADMIN — ATORVASTATIN CALCIUM 20 MILLIGRAM(S): 80 TABLET, FILM COATED ORAL at 22:06

## 2021-08-11 NOTE — CONSULT NOTE ADULT - ASSESSMENT
Vascular & Interventional Radiology Consult Note    Evaluate for Procedure: Kyphoplasty    HPI: 89y Female PMH TIA, HTN, A.fib, multiple falls with vertebral compression fractures s/p fall two weeks ago admitted with worsening back pain and CT showing multilevel age indeterminate vertebral compression fractures. IR originally c/s  vertebroplasty and recommended MR to evaluate age of fractures and GOC discussion if no longer persuing hospice. Family would like treatment and MR T/L spine revealed only T9 as an acute compression fracture. IR re-c/s for kyphoplasty of T9.    Allergies:   Medications (Abx/Cardiac/Anticoagulation/Blood Products)  aMIOdarone    Tablet: 200 milliGRAM(s) Oral (08-11 @ 05:59)  amLODIPine   Tablet: 10 milliGRAM(s) Oral (08-11 @ 05:59)  heparin   Injectable: 5000 Unit(s) SubCutaneous (08-11 @ 05:59)  lisinopril: 30 milliGRAM(s) Oral (08-11 @ 06:00)  propranolol: 40 milliGRAM(s) Oral (08-11 @ 06:00)    Data:    T(C): 36.6  HR: 60  BP: 130/70  RR: 18  SpO2: 95%    -WBC 6.92 / HgB 12.4 / Hct 37.3 / Plt 291  -Na 141 / Cl 101 / BUN 12 / Glucose 89  -K 3.6 / CO2 26 / Cr 0.81  -ALT 14 / Alk Phos 87 / T.Bili 0.4    Imaging:   < from: MR Thoracic Spine No Cont (08.11.21 @ 10:03) >  Evaluation of the thoracic spine demonstrates old marked compression fractures with normal signal involving the T3 and T8 vertebral bodies. There is an acute mild to moderate compression deformity of T9 with abnormal low signal intensity in the T1-weighted images involving the marrow with mild bony retropulsion. The posterior elements are spared. No other regions of abnormal signal are identified. This could represent a benign or malignant compression deformity. There is no cord compression. The thoracic cord is normal in size, contour, and signal characteristics. The conus terminates normally at the T12-L1 level.    Evaluation of the lumbar spine demonstrates an old moderate compression deformity of L1 with low signal cement from kyphoplasty involving the superior endplate. There are mild old compression deformities of L2 and L3. Degenerative disc disease is noted at multiple levels. There is mild narrowing of the ventral canal at the T12-L1 level due to the posterior superior corner of the L1 vertebral body. There is a disc osteophyte complex at L2-3 with mild to moderate degenerative spinal stenosis. There is a small disc herniation at L4-5 without significant thecal sac compression.        There is a small right pleural effusion.        IMPRESSION: Mild to moderate acute compression deformity of T9 with mild bony retropulsion. Abnormal signal involving the T9 vertebral body consistent with fracture. Cannot differentiate benign from pathologic compression fracture. No posterior element involvement. Old compression deformities T3 T8 and L1. No cord or cauda equina compression.    < end of copied text >      Assessment:   89y Female PMH TIA, HTN, A.fib, multiple falls with vertebral compression fractures s/p fall two weeks ago admitted with worsening back pain and CT showing multilevel age indeterminate vertebral compression fractures. IR originally c/s  vertebroplasty and recommended MR to evaluate age of fractures and GOC discussion if no longer persuing hospice. Family would like treatment and MR T/L spine revealed only T9 as an acute compression fracture. IR re-c/s for kyphoplasty of T9.    Plan:  - T9 kyphoplasty planned for Wed 8/18/21.  - Please call the IR booking office at [phone] to confirm appointment and discuss any necessary paperwork/labs prior to the procedure.  - A COVID-19 testing appointment has already been made for 8/15/21 at 2:50pm at [facility] (Entrance #3) COVID-19 testing drive-through.  - If there is any change to the plan such as a desire to pursue this inpatient please page IR at [phone] o/w call [phone] for any OP booking questions.  - Plan d/w Dr. Amy Travis   
Assessment/Plan:   89y Female PMH TIA, HTN, A.fib, multiple falls with vertebral compression fractures s/p fall two weeks ago admitted with worsening back pain and CT showing multilevel age indeterminate vertebral compression fractures. IR consulted for vertebroplasty.   - Given multilevel age indeterminate compression fractures on CT, would need MRI to evaluate candidacy for vertebroplasty   - Per documentation patient and family are considering hospice care  - Recommend ongoing discussion with the patient and family to clarify goals of care  - Pending GOC discussion, recommend MRI thoracic and lumbar spine  - Please reconsult IR once above complete if patient and family wish to be evaluated for vertebroplasty    D/w Dr. Travis, IR Attending.

## 2021-08-11 NOTE — PROGRESS NOTE ADULT - ASSESSMENT
89 year old female with PMH of TIA, Htn, Thyroid disease, GERD, multiple falls with vertebral fractures, presents for worsening back pain after recent fall. Currently waiting for spine MRI prior to possible IR vertebroplasty.

## 2021-08-11 NOTE — PROGRESS NOTE ADULT - SUBJECTIVE AND OBJECTIVE BOX
Javier Brown, PGY1    DATE OF SERVICE: 08-11-21 @ 07:24    Patient is a 89y old  Female who presents with a chief complaint of back pain x 2 weeks (10 Aug 2021 14:18)      SUBJECTIVE / OVERNIGHT EVENTS: No acute events overnight.     MEDICATIONS  (STANDING):  acetaminophen   Tablet .. 1000 milliGRAM(s) Oral every 8 hours  aMIOdarone    Tablet 200 milliGRAM(s) Oral daily  amLODIPine   Tablet 10 milliGRAM(s) Oral daily  atorvastatin 20 milliGRAM(s) Oral at bedtime  calcium carbonate 1250 mG  + Vitamin D (OsCal 500 + D) 1 Tablet(s) Oral daily  heparin   Injectable 5000 Unit(s) SubCutaneous every 12 hours  levothyroxine 125 MICROGram(s) Oral daily  lidocaine   4% Patch 1 Patch Transdermal daily  lisinopril 30 milliGRAM(s) Oral daily  multivitamin 1 Tablet(s) Oral daily  polyethylene glycol 3350 17 Gram(s) Oral daily  propranolol 40 milliGRAM(s) Oral two times a day  senna 2 Tablet(s) Oral at bedtime    MEDICATIONS  (PRN):  melatonin 3 milliGRAM(s) Oral at bedtime PRN Insomnia  morphine  - Injectable 2 milliGRAM(s) IV Push every 6 hours PRN Severe Pain (7 - 10)  oxyCODONE    IR 5 milliGRAM(s) Oral every 4 hours PRN Moderate Pain (4 - 6)      Vital Signs Last 24 Hrs  T(C): 36.7 (11 Aug 2021 04:34), Max: 36.7 (11 Aug 2021 04:34)  T(F): 98.1 (11 Aug 2021 04:34), Max: 98.1 (11 Aug 2021 04:34)  HR: 60 (11 Aug 2021 04:34) (60 - 66)  BP: 130/73 (11 Aug 2021 04:34) (130/73 - 141/77)  BP(mean): --  RR: 18 (11 Aug 2021 04:34) (18 - 18)  SpO2: 94% (11 Aug 2021 04:34) (93% - 96%)  CAPILLARY BLOOD GLUCOSE        I&O's Summary      PHYSICAL EXAM:  GENERAL: NAD, well-developed  HEAD:  Atraumatic, Normocephalic  EYES: EOMI, PERRLA, conjunctiva and sclera clear  NECK: Supple, No JVD  CHEST/LUNG: Clear to auscultation bilaterally; No wheeze  HEART: Regular rate and rhythm; No murmurs, rubs, or gallops  ABDOMEN: Soft, Nontender, Nondistended; Bowel sounds present  EXTREMITIES:  2+ Peripheral Pulses, No clubbing, cyanosis, or edema  PSYCH: AAOx3  NEUROLOGY: non-focal  SKIN: No rashes or lesions    LABS:                    RADIOLOGY & ADDITIONAL TESTS:    Imaging Personally Reviewed:    Consultant(s) Notes Reviewed:      Care Discussed with Consultants/Other Providers:   Javier Howardisabel, PGY1    DATE OF SERVICE: 08-11-21 @ 07:24    Patient is a 89y old  Female who presents with a chief complaint of back pain x 2 weeks (10 Aug 2021 14:18)      SUBJECTIVE / OVERNIGHT EVENTS: No acute events overnight. She was doing well this AM. She denied chest pain, shortness of breath, n/v, abdominal pain. She is looking forward to MRI today.     MEDICATIONS  (STANDING):  acetaminophen   Tablet .. 1000 milliGRAM(s) Oral every 8 hours  aMIOdarone    Tablet 200 milliGRAM(s) Oral daily  amLODIPine   Tablet 10 milliGRAM(s) Oral daily  atorvastatin 20 milliGRAM(s) Oral at bedtime  calcium carbonate 1250 mG  + Vitamin D (OsCal 500 + D) 1 Tablet(s) Oral daily  heparin   Injectable 5000 Unit(s) SubCutaneous every 12 hours  levothyroxine 125 MICROGram(s) Oral daily  lidocaine   4% Patch 1 Patch Transdermal daily  lisinopril 30 milliGRAM(s) Oral daily  multivitamin 1 Tablet(s) Oral daily  polyethylene glycol 3350 17 Gram(s) Oral daily  propranolol 40 milliGRAM(s) Oral two times a day  senna 2 Tablet(s) Oral at bedtime    MEDICATIONS  (PRN):  melatonin 3 milliGRAM(s) Oral at bedtime PRN Insomnia  morphine  - Injectable 2 milliGRAM(s) IV Push every 6 hours PRN Severe Pain (7 - 10)  oxyCODONE    IR 5 milliGRAM(s) Oral every 4 hours PRN Moderate Pain (4 - 6)      Vital Signs Last 24 Hrs  T(C): 36.7 (11 Aug 2021 04:34), Max: 36.7 (11 Aug 2021 04:34)  T(F): 98.1 (11 Aug 2021 04:34), Max: 98.1 (11 Aug 2021 04:34)  HR: 60 (11 Aug 2021 04:34) (60 - 66)  BP: 130/73 (11 Aug 2021 04:34) (130/73 - 141/77)  BP(mean): --  RR: 18 (11 Aug 2021 04:34) (18 - 18)  SpO2: 94% (11 Aug 2021 04:34) (93% - 96%)  CAPILLARY BLOOD GLUCOSE        I&O's Summary      PHYSICAL EXAM  CONSTITUTIONAL: NAD, well-developed, well-groomed, laying comfortably in bed  EYES: PERRLA; conjunctiva and sclera clear  ENMT: Moist oral mucosa, no pharyngeal injection or exudates; normal dentition  NECK: Supple, no palpable masses; no thyromegaly  RESPIRATORY: Normal respiratory effort; lungs are clear to auscultation bilaterally  CARDIOVASCULAR: Regular rate and rhythm, normal S1 and S2, no murmur/rub/gallop; No lower extremity edema; Peripheral pulses are 2+ bilaterally  ABDOMEN: Nontender to palpation, normoactive bowel sounds, no rebound/guarding; No hepatosplenomegaly  MUSCULOSKELETAL: no clubbing or cyanosis of digits; no joint swelling; midthoracic spinal tenderness   PSYCH: A+O to person, place, and time; affect appropriate  NEUROLOGY: CN 2-12 are intact and symmetric; no gross sensory deficits     LABS:                    RADIOLOGY & ADDITIONAL TESTS:    Imaging Personally Reviewed:    Consultant(s) Notes Reviewed:      Care Discussed with Consultants/Other Providers:   Javier Kevin, PGY1    DATE OF SERVICE: 08-11-21 @ 07:24    Patient is a 89y old  Female who presents with a chief complaint of back pain x 2 weeks (10 Aug 2021 14:18)      SUBJECTIVE / OVERNIGHT EVENTS: No acute events overnight. She was doing well this AM. She denied chest pain, shortness of breath, n/v, abdominal pain. She is looking forward to MRI today.     - Patient will need TLSO brace    MEDICATIONS  (STANDING):  acetaminophen   Tablet .. 1000 milliGRAM(s) Oral every 8 hours  aMIOdarone    Tablet 200 milliGRAM(s) Oral daily  amLODIPine   Tablet 10 milliGRAM(s) Oral daily  atorvastatin 20 milliGRAM(s) Oral at bedtime  calcium carbonate 1250 mG  + Vitamin D (OsCal 500 + D) 1 Tablet(s) Oral daily  heparin   Injectable 5000 Unit(s) SubCutaneous every 12 hours  levothyroxine 125 MICROGram(s) Oral daily  lidocaine   4% Patch 1 Patch Transdermal daily  lisinopril 30 milliGRAM(s) Oral daily  multivitamin 1 Tablet(s) Oral daily  polyethylene glycol 3350 17 Gram(s) Oral daily  propranolol 40 milliGRAM(s) Oral two times a day  senna 2 Tablet(s) Oral at bedtime    MEDICATIONS  (PRN):  melatonin 3 milliGRAM(s) Oral at bedtime PRN Insomnia  morphine  - Injectable 2 milliGRAM(s) IV Push every 6 hours PRN Severe Pain (7 - 10)  oxyCODONE    IR 5 milliGRAM(s) Oral every 4 hours PRN Moderate Pain (4 - 6)      Vital Signs Last 24 Hrs  T(C): 36.7 (11 Aug 2021 04:34), Max: 36.7 (11 Aug 2021 04:34)  T(F): 98.1 (11 Aug 2021 04:34), Max: 98.1 (11 Aug 2021 04:34)  HR: 60 (11 Aug 2021 04:34) (60 - 66)  BP: 130/73 (11 Aug 2021 04:34) (130/73 - 141/77)  BP(mean): --  RR: 18 (11 Aug 2021 04:34) (18 - 18)  SpO2: 94% (11 Aug 2021 04:34) (93% - 96%)  CAPILLARY BLOOD GLUCOSE        I&O's Summary      PHYSICAL EXAM  CONSTITUTIONAL: NAD, well-developed, well-groomed, laying comfortably in bed  EYES: PERRLA; conjunctiva and sclera clear  ENMT: Moist oral mucosa, no pharyngeal injection or exudates; normal dentition  NECK: Supple, no palpable masses; no thyromegaly  RESPIRATORY: Normal respiratory effort; lungs are clear to auscultation bilaterally  CARDIOVASCULAR: Regular rate and rhythm, normal S1 and S2, no murmur/rub/gallop; No lower extremity edema; Peripheral pulses are 2+ bilaterally  ABDOMEN: Nontender to palpation, normoactive bowel sounds, no rebound/guarding; No hepatosplenomegaly  MUSCULOSKELETAL: no clubbing or cyanosis of digits; no joint swelling; midthoracic spinal tenderness   PSYCH: A+O to person, place, and time; affect appropriate  NEUROLOGY: CN 2-12 are intact and symmetric; no gross sensory deficits     LABS:                    RADIOLOGY & ADDITIONAL TESTS:    Imaging Personally Reviewed:    Consultant(s) Notes Reviewed:      Care Discussed with Consultants/Other Providers:

## 2021-08-11 NOTE — PROGRESS NOTE ADULT - PROBLEM SELECTOR PLAN 1
no neurologic deficits , reported multiple vertebral fractures   -  CT T and L spine --> < from: CT Lumbar Spine No Cont (08.07.21 @ 21:20) > Multilevel age-indeterminate compression deformities. Diffuse osteopenia advanced multilevel spondylosis. Small bilateral pleural effusions with overlying compressive atelectasis. Bilateral renal and hepatic cysts. < end of copied text >  - tylenol PRN for mild pain  - oxycodone IR PRN for moderate pain  - IV morphine PRN for severe pain   - Senna, miralax   - Fall precautions, ambulate with assistance  - PT  - IR recommends MRI thoracic and lumbar spine, ordered 8/9 no neurologic deficits , reported multiple vertebral fractures   -  CT T and L spine --> < from: CT Lumbar Spine No Cont (08.07.21 @ 21:20) > Multilevel age-indeterminate compression deformities. Diffuse osteopenia advanced multilevel spondylosis. Small bilateral pleural effusions with overlying compressive atelectasis. Bilateral renal and hepatic cysts. < end of copied text >  - tylenol PRN for mild pain  - oxycodone IR PRN for moderate pain  - IV morphine PRN for severe pain   - Senna, miralax   - Fall precautions, ambulate with assistance  - PT  - IR recommends MRI thoracic and lumbar spine, schedule for today 8/11  - will reconsult IR after results are back no neurologic deficits , reported multiple vertebral fractures   -  CT T and L spine --> < from: CT Lumbar Spine No Cont (08.07.21 @ 21:20) > Multilevel age-indeterminate compression deformities. Diffuse osteopenia advanced multilevel spondylosis. Small bilateral pleural effusions with overlying compressive atelectasis. Bilateral renal and hepatic cysts. < end of copied text >  - tylenol PRN for mild pain  - oxycodone IR PRN for moderate pain  - IV morphine PRN for severe pain   - Senna, miralax   - Fall precautions, ambulate with assistance  - PT  - IR recommends MRI thoracic and lumbar spine, schedule for today 8/11  - will reconsult IR after results are back  - Patient will need TLSO brace

## 2021-08-12 ENCOUNTER — TRANSCRIPTION ENCOUNTER (OUTPATIENT)
Age: 86
End: 2021-08-12

## 2021-08-12 ENCOUNTER — APPOINTMENT (OUTPATIENT)
Dept: GASTROENTEROLOGY | Facility: CLINIC | Age: 86
End: 2021-08-12

## 2021-08-12 VITALS
HEART RATE: 58 BPM | SYSTOLIC BLOOD PRESSURE: 146 MMHG | TEMPERATURE: 98 F | RESPIRATION RATE: 18 BRPM | DIASTOLIC BLOOD PRESSURE: 75 MMHG | OXYGEN SATURATION: 95 %

## 2021-08-12 PROCEDURE — 96365 THER/PROPH/DIAG IV INF INIT: CPT

## 2021-08-12 PROCEDURE — U0005: CPT

## 2021-08-12 PROCEDURE — 71045 X-RAY EXAM CHEST 1 VIEW: CPT

## 2021-08-12 PROCEDURE — 93005 ELECTROCARDIOGRAM TRACING: CPT

## 2021-08-12 PROCEDURE — 96366 THER/PROPH/DIAG IV INF ADDON: CPT

## 2021-08-12 PROCEDURE — 72128 CT CHEST SPINE W/O DYE: CPT | Mod: MA

## 2021-08-12 PROCEDURE — 99239 HOSP IP/OBS DSCHRG MGMT >30: CPT | Mod: GC

## 2021-08-12 PROCEDURE — 85025 COMPLETE CBC W/AUTO DIFF WBC: CPT

## 2021-08-12 PROCEDURE — 72131 CT LUMBAR SPINE W/O DYE: CPT | Mod: MA

## 2021-08-12 PROCEDURE — U0003: CPT

## 2021-08-12 PROCEDURE — 72146 MRI CHEST SPINE W/O DYE: CPT

## 2021-08-12 PROCEDURE — 83735 ASSAY OF MAGNESIUM: CPT

## 2021-08-12 PROCEDURE — 86769 SARS-COV-2 COVID-19 ANTIBODY: CPT

## 2021-08-12 PROCEDURE — 99285 EMERGENCY DEPT VISIT HI MDM: CPT

## 2021-08-12 PROCEDURE — 80053 COMPREHEN METABOLIC PANEL: CPT

## 2021-08-12 PROCEDURE — 81001 URINALYSIS AUTO W/SCOPE: CPT

## 2021-08-12 PROCEDURE — 97162 PT EVAL MOD COMPLEX 30 MIN: CPT

## 2021-08-12 PROCEDURE — 72148 MRI LUMBAR SPINE W/O DYE: CPT

## 2021-08-12 PROCEDURE — 72170 X-RAY EXAM OF PELVIS: CPT

## 2021-08-12 PROCEDURE — 80048 BASIC METABOLIC PNL TOTAL CA: CPT

## 2021-08-12 PROCEDURE — 36415 COLL VENOUS BLD VENIPUNCTURE: CPT

## 2021-08-12 PROCEDURE — 84100 ASSAY OF PHOSPHORUS: CPT

## 2021-08-12 PROCEDURE — 85027 COMPLETE CBC AUTOMATED: CPT

## 2021-08-12 PROCEDURE — 96368 THER/DIAG CONCURRENT INF: CPT

## 2021-08-12 RX ORDER — GLYCERIN ADULT
1 SUPPOSITORY, RECTAL RECTAL
Qty: 0 | Refills: 0 | DISCHARGE

## 2021-08-12 RX ORDER — ACETAMINOPHEN 500 MG
2 TABLET ORAL
Qty: 42 | Refills: 0
Start: 2021-08-12 | End: 2021-08-18

## 2021-08-12 RX ADMIN — AMIODARONE HYDROCHLORIDE 200 MILLIGRAM(S): 400 TABLET ORAL at 05:17

## 2021-08-12 RX ADMIN — Medication 1000 MILLIGRAM(S): at 05:26

## 2021-08-12 RX ADMIN — HEPARIN SODIUM 5000 UNIT(S): 5000 INJECTION INTRAVENOUS; SUBCUTANEOUS at 17:41

## 2021-08-12 RX ADMIN — Medication 125 MICROGRAM(S): at 05:18

## 2021-08-12 RX ADMIN — LISINOPRIL 30 MILLIGRAM(S): 2.5 TABLET ORAL at 05:17

## 2021-08-12 RX ADMIN — AMLODIPINE BESYLATE 10 MILLIGRAM(S): 2.5 TABLET ORAL at 05:17

## 2021-08-12 RX ADMIN — LIDOCAINE 1 PATCH: 4 CREAM TOPICAL at 17:26

## 2021-08-12 RX ADMIN — LIDOCAINE 1 PATCH: 4 CREAM TOPICAL at 12:26

## 2021-08-12 RX ADMIN — Medication 1 TABLET(S): at 12:27

## 2021-08-12 RX ADMIN — HEPARIN SODIUM 5000 UNIT(S): 5000 INJECTION INTRAVENOUS; SUBCUTANEOUS at 05:16

## 2021-08-12 RX ADMIN — Medication 1000 MILLIGRAM(S): at 13:48

## 2021-08-12 NOTE — DISCHARGE NOTE NURSING/CASE MANAGEMENT/SOCIAL WORK - NSDCFUADDAPPT_GEN_ALL_CORE_FT
VISITING NURSE WILL CONTACT YOU THE DAY AFTER DISCHARGE TO SCHEDULE APPOINTMENT.     1. Please be sure to follow-up with your PCP in 2 weeks to monitor the progression of your symptoms.  2. Please be sure to attend you Interventional Radiology (IR) appointment on 8/18/2021 at [facility] ([address] to have the kyphoplasty procedure performed.   - Please call the IR booking office at [phone] to confirm appointment and time.  ** A COVID-19 testing appointment has already been made for 8/15/21 at 2:50pm at [facility] (Entrance #3) COVID-19 testing drive-through.

## 2021-08-12 NOTE — PROGRESS NOTE ADULT - PROBLEM SELECTOR PLAN 5
- sub q heparin for dvt ppx  - daily multivitamin - sub q heparin for dvt ppx  - daily multivitamin  - dispo: home with home PT

## 2021-08-12 NOTE — PROGRESS NOTE ADULT - PROBLEM SELECTOR PLAN 1
no neurologic deficits , reported multiple vertebral fractures   -  CT T and L spine --> < from: CT Lumbar Spine No Cont (08.07.21 @ 21:20) > Multilevel age-indeterminate compression deformities. Diffuse osteopenia advanced multilevel spondylosis. Small bilateral pleural effusions with overlying compressive atelectasis. Bilateral renal and hepatic cysts. < end of copied text >  - tylenol PRN for mild pain  - oxycodone IR PRN for moderate pain  - Senna, miralax   - Fall precautions, ambulate with assistance  - PT  - MRI demonstrates acute T9 compression fracture, in addition to other compression deformities  - IR planning kyphoplasty next Wed, 8/18   - Patient will need TLSO brace

## 2021-08-12 NOTE — PHYSICAL THERAPY INITIAL EVALUATION ADULT - PRECAUTIONS/LIMITATIONS, REHAB EVAL
CT Lumbar Spine No Cont (08.07.21 @ 21:20) > Multilevel age-indeterminate compression deformities. Diffuse osteopenia advanced multilevel spondylosis. Small bilateral pleural effusions with overlying compressive atelectasis. Bilateral renal and hepatic cysts./fall precautions

## 2021-08-12 NOTE — PROGRESS NOTE ADULT - ATTENDING COMMENTS
89 year old female with PMH of TIA, Htn, Thyroid disease, GERD, multiple falls with vertebral fractures, presents for worsening back pain after recent fall.   -  CT T and L spine --> < from: CT Lumbar Spine No Cont (08.07.21 @ 21:20) > Multilevel age-indeterminate compression deformities. Diffuse osteopenia advanced multilevel spondylosis. Small bilateral pleural effusions with overlying compressive atelectasis. Bilateral renal and hepatic cysts. < end of copied text >  - Tylenol PRN for mild pain  - oxycodone IR PRN for moderate pain  - IV morphine PRN for severe pain   - Senna, miralax   - IR eval fo kyphoplasty --> they recommends MRI thoracic and lumbar spine--> < from: MR Lumbar Spine No Cont (08.11.21 @ 10:04) >Mild to moderate acute compression deformity of T9 with mild bony retropulsion. Abnormal signal involving the T9 vertebral body consistent with fracture. Cannot differentiate benign from pathologic compression fracture. No posterior element involvement. Old compression deformities T3 T8 and L1. No cord or cauda equina compression.  < end of copied text >
89 year old female with PMH of TIA, Htn, Thyroid disease, GERD, multiple falls with vertebral fractures, presents for worsening back pain after recent fall.   -  CT T and L spine --> Multilevel age-indeterminate compression deformities. Diffuse osteopenia advanced multilevel spondylosis. Small bilateral pleural effusions with overlying compressive atelectasis.   - Tylenol PRN for mild pain  - oxycodone IR PRN for moderate pain  - IV morphine PRN for severe pain   - Senna, miralax   - IR planning kyphoplasty next Wednesday  - TLSO brace obtained     stable for discharge today.  Discharge time spent: 32 min
89 year old female with PMH of TIA, Htn, Thyroid disease, GERD, multiple falls with vertebral fractures, presents for worsening back pain after recent fall.   -  CT T and L spine --> < from: CT Lumbar Spine No Cont (08.07.21 @ 21:20) > Multilevel age-indeterminate compression deformities. Diffuse osteopenia advanced multilevel spondylosis. Small bilateral pleural effusions with overlying compressive atelectasis. Bilateral renal and hepatic cysts. < end of copied text >  - tylenol PRN for mild pain  - oxycodone IR PRN for moderate pain  - IV morphine PRN for severe pain   - Senna, miralax   - Fall precautions, ambulate with assistance  - PT  - IR eval fo kyphoplasty --> they recommends MRI thoracic and lumbar spine which will be performed tomorrow at 5pm  plan d/w son at  bedside

## 2021-08-12 NOTE — DISCHARGE NOTE NURSING/CASE MANAGEMENT/SOCIAL WORK - PATIENT PORTAL LINK FT
You can access the FollowMyHealth Patient Portal offered by [facility] by registering at the following website: [email or web address]. By joining [facility]’s FollowMyHealth portal, you will also be able to view your health information using other applications (apps) compatible with our system.

## 2021-08-12 NOTE — PHYSICAL THERAPY INITIAL EVALUATION ADULT - ADDITIONAL COMMENTS
Pt lives in apartment (+) elevator access with 24/7 HHA for ADLs and functional mobility. PTA pt was independent ambulating household distances with rollator. Pt owns w/c, walker, rollator, shower chair.

## 2021-08-12 NOTE — PROGRESS NOTE ADULT - PROBLEM SELECTOR PROBLEM 1
Compression fracture of body of thoracic vertebra

## 2021-08-12 NOTE — PROGRESS NOTE ADULT - NUTRITIONAL ASSESSMENT
This patient has been assessed with a concern for Malnutrition and has been determined to have a diagnosis/diagnoses of Moderate protein-calorie malnutrition.    This patient is being managed with:   Diet Regular-  Entered: Aug  8 2021  4:31AM    
This patient has been assessed with a concern for Malnutrition and has been determined to have a diagnosis/diagnoses of Moderate protein-calorie malnutrition.    This patient is being managed with:   Diet Regular-  Entered: Aug  8 2021  4:31AM

## 2021-08-12 NOTE — PROGRESS NOTE ADULT - SUBJECTIVE AND OBJECTIVE BOX
Javier Brown, PGY1    DATE OF SERVICE: 08-12-21 @ 07:27    Patient is a 89y old  Female who presents with a chief complaint of back pain x 2 weeks (11 Aug 2021 15:26)      SUBJECTIVE / OVERNIGHT EVENTS:    MEDICATIONS  (STANDING):  acetaminophen   Tablet .. 1000 milliGRAM(s) Oral every 8 hours  aMIOdarone    Tablet 200 milliGRAM(s) Oral daily  amLODIPine   Tablet 10 milliGRAM(s) Oral daily  atorvastatin 20 milliGRAM(s) Oral at bedtime  calcium carbonate 1250 mG  + Vitamin D (OsCal 500 + D) 1 Tablet(s) Oral daily  heparin   Injectable 5000 Unit(s) SubCutaneous every 12 hours  levothyroxine 125 MICROGram(s) Oral daily  lidocaine   4% Patch 1 Patch Transdermal daily  lisinopril 30 milliGRAM(s) Oral daily  multivitamin 1 Tablet(s) Oral daily  polyethylene glycol 3350 17 Gram(s) Oral daily  propranolol 40 milliGRAM(s) Oral two times a day  senna 2 Tablet(s) Oral at bedtime    MEDICATIONS  (PRN):  melatonin 3 milliGRAM(s) Oral at bedtime PRN Insomnia      Vital Signs Last 24 Hrs  T(C): 36.9 (12 Aug 2021 04:31), Max: 36.9 (12 Aug 2021 04:31)  T(F): 98.4 (12 Aug 2021 04:31), Max: 98.4 (12 Aug 2021 04:31)  HR: 61 (12 Aug 2021 04:31) (60 - 61)  BP: 112/72 (12 Aug 2021 04:31) (112/72 - 130/70)  BP(mean): --  RR: 18 (12 Aug 2021 04:31) (18 - 18)  SpO2: 95% (12 Aug 2021 04:31) (94% - 95%)  CAPILLARY BLOOD GLUCOSE        I&O's Summary    11 Aug 2021 07:01  -  12 Aug 2021 07:00  --------------------------------------------------------  IN: 240 mL / OUT: 0 mL / NET: 240 mL        PHYSICAL EXAM  CONSTITUTIONAL: NAD, well-developed, well-groomed, laying comfortably in bed  EYES: PERRLA; conjunctiva and sclera clear  ENMT: Moist oral mucosa, no pharyngeal injection or exudates; normal dentition  NECK: Supple, no palpable masses; no thyromegaly  RESPIRATORY: Normal respiratory effort; lungs are clear to auscultation bilaterally  CARDIOVASCULAR: Regular rate and rhythm, normal S1 and S2, no murmur/rub/gallop; No lower extremity edema; Peripheral pulses are 2+ bilaterally  ABDOMEN: Nontender to palpation, normoactive bowel sounds, no rebound/guarding; No hepatosplenomegaly  MUSCULOSKELETAL: no clubbing or cyanosis of digits; no joint swelling; midthoracic spinal tenderness   PSYCH: A+O to person, place, and time; affect appropriate  NEUROLOGY: CN 2-12 are intact and symmetric; no gross sensory deficits       LABS:                    RADIOLOGY & ADDITIONAL TESTS:    Imaging Personally Reviewed:    Consultant(s) Notes Reviewed:      Care Discussed with Consultants/Other Providers:   Javier Howardisabel, PGY1    DATE OF SERVICE: 08-12-21 @ 07:27    Patient is a 89y old  Female who presents with a chief complaint of back pain x 2 weeks (11 Aug 2021 15:26)      SUBJECTIVE / OVERNIGHT EVENTS: Patient denied back pain this AM. She was doing well and looking forward to going home today. She denied chest pain, shortness or breath, n/v or abdominal pain.     MEDICATIONS  (STANDING):  acetaminophen   Tablet .. 1000 milliGRAM(s) Oral every 8 hours  aMIOdarone    Tablet 200 milliGRAM(s) Oral daily  amLODIPine   Tablet 10 milliGRAM(s) Oral daily  atorvastatin 20 milliGRAM(s) Oral at bedtime  calcium carbonate 1250 mG  + Vitamin D (OsCal 500 + D) 1 Tablet(s) Oral daily  heparin   Injectable 5000 Unit(s) SubCutaneous every 12 hours  levothyroxine 125 MICROGram(s) Oral daily  lidocaine   4% Patch 1 Patch Transdermal daily  lisinopril 30 milliGRAM(s) Oral daily  multivitamin 1 Tablet(s) Oral daily  polyethylene glycol 3350 17 Gram(s) Oral daily  propranolol 40 milliGRAM(s) Oral two times a day  senna 2 Tablet(s) Oral at bedtime    MEDICATIONS  (PRN):  melatonin 3 milliGRAM(s) Oral at bedtime PRN Insomnia      Vital Signs Last 24 Hrs  T(C): 36.9 (12 Aug 2021 04:31), Max: 36.9 (12 Aug 2021 04:31)  T(F): 98.4 (12 Aug 2021 04:31), Max: 98.4 (12 Aug 2021 04:31)  HR: 61 (12 Aug 2021 04:31) (60 - 61)  BP: 112/72 (12 Aug 2021 04:31) (112/72 - 130/70)  BP(mean): --  RR: 18 (12 Aug 2021 04:31) (18 - 18)  SpO2: 95% (12 Aug 2021 04:31) (94% - 95%)  CAPILLARY BLOOD GLUCOSE        I&O's Summary    11 Aug 2021 07:01  -  12 Aug 2021 07:00  --------------------------------------------------------  IN: 240 mL / OUT: 0 mL / NET: 240 mL        PHYSICAL EXAM  CONSTITUTIONAL: NAD, well-developed, well-groomed, laying comfortably in bed  EYES: PERRLA; conjunctiva and sclera clear  ENMT: Moist oral mucosa, no pharyngeal injection or exudates; normal dentition  NECK: Supple, no palpable masses; no thyromegaly  RESPIRATORY: Normal respiratory effort; lungs are clear to auscultation bilaterally  CARDIOVASCULAR: Regular rate and rhythm, normal S1 and S2, no murmur/rub/gallop; No lower extremity edema; Peripheral pulses are 2+ bilaterally  ABDOMEN: Nontender to palpation, normoactive bowel sounds, no rebound/guarding; No hepatosplenomegaly  MUSCULOSKELETAL: no clubbing or cyanosis of digits; no joint swelling; midthoracic spinal tenderness   PSYCH: A+O to person, place, and time; affect appropriate  NEUROLOGY: CN 2-12 are intact and symmetric; no gross sensory deficits       LABS:                    RADIOLOGY & ADDITIONAL TESTS:    Imaging Personally Reviewed:    Consultant(s) Notes Reviewed:      Care Discussed with Consultants/Other Providers:

## 2021-08-12 NOTE — PHYSICAL THERAPY INITIAL EVALUATION ADULT - LEVEL OF INDEPENDENCE: SIT/STAND, REHAB EVAL
x3 attempts, mod A progressed to min A, pt performs slowly and limited by fear/apprehension/minimum assist (75% patients effort)/moderate assist (50% patients effort)

## 2021-08-12 NOTE — CHART NOTE - NSCHARTNOTEFT_GEN_A_CORE
89 YO female with T9 compression fracture was seen today for fitting of TLSO to control motion and pain.  Pt measured and otrthosis reassembled for her dimensions. Posterior panel modified for kyphosis.  Tension straps cut and reassembled  Son present at fitting and instrcuted to don and dof  Written instructions left at bedside  Pt scheduled for kyphoplasty next week.  Follow up if needed      Shen jurado [place]  [phone]

## 2021-08-12 NOTE — PROGRESS NOTE ADULT - ASSESSMENT
89 year old female with PMH of TIA, Htn, Thyroid disease, GERD, multiple falls with vertebral fractures, presents for worsening back pain after recent fall. Patient will likely go home today and return to hospital next Wed for IR planned kyphoplasty.

## 2021-08-12 NOTE — PHYSICAL THERAPY INITIAL EVALUATION ADULT - PERTINENT HX OF CURRENT PROBLEM, REHAB EVAL
89 year old female with PMH of TIA, HTN, Thyroid disease, GERD, and multiple falls with vertebral fractures, presents for worsening back pain after recent fall. 2 weeks prior outpatient imaging showed T9 compression fx.

## 2021-08-13 ENCOUNTER — NON-APPOINTMENT (OUTPATIENT)
Age: 86
End: 2021-08-13

## 2021-08-15 ENCOUNTER — OUTPATIENT (OUTPATIENT)
Dept: OUTPATIENT SERVICES | Facility: HOSPITAL | Age: 86
LOS: 1 days | End: 2021-08-15
Payer: MEDICARE

## 2021-08-15 DIAGNOSIS — Z11.52 ENCOUNTER FOR SCREENING FOR COVID-19: ICD-10-CM

## 2021-08-15 DIAGNOSIS — Z98.890 OTHER SPECIFIED POSTPROCEDURAL STATES: Chronic | ICD-10-CM

## 2021-08-16 PROCEDURE — U0005: CPT

## 2021-08-16 PROCEDURE — C9803: CPT

## 2021-08-16 PROCEDURE — U0003: CPT

## 2021-08-17 ENCOUNTER — APPOINTMENT (OUTPATIENT)
Dept: DERMATOLOGY | Facility: CLINIC | Age: 86
End: 2021-08-17

## 2021-08-17 LAB — SARS-COV-2 RNA SPEC QL NAA+PROBE: SIGNIFICANT CHANGE UP

## 2021-08-25 ENCOUNTER — RESULT REVIEW (OUTPATIENT)
Age: 86
End: 2021-08-25

## 2021-08-25 ENCOUNTER — OUTPATIENT (OUTPATIENT)
Dept: OUTPATIENT SERVICES | Facility: HOSPITAL | Age: 86
LOS: 1 days | End: 2021-08-25
Payer: MEDICARE

## 2021-08-25 VITALS
TEMPERATURE: 98 F | HEART RATE: 64 BPM | SYSTOLIC BLOOD PRESSURE: 138 MMHG | DIASTOLIC BLOOD PRESSURE: 62 MMHG | RESPIRATION RATE: 18 BRPM | OXYGEN SATURATION: 99 %

## 2021-08-25 VITALS
TEMPERATURE: 98 F | DIASTOLIC BLOOD PRESSURE: 86 MMHG | RESPIRATION RATE: 18 BRPM | HEIGHT: 61 IN | SYSTOLIC BLOOD PRESSURE: 170 MMHG | WEIGHT: 128.09 LBS | OXYGEN SATURATION: 96 % | HEART RATE: 59 BPM

## 2021-08-25 DIAGNOSIS — Z98.890 OTHER SPECIFIED POSTPROCEDURAL STATES: Chronic | ICD-10-CM

## 2021-08-25 DIAGNOSIS — Z00.00 ENCOUNTER FOR GENERAL ADULT MEDICAL EXAMINATION WITHOUT ABNORMAL FINDINGS: ICD-10-CM

## 2021-08-25 LAB — SARS-COV-2 N GENE NPH QL NAA+PROBE: NOT DETECTED

## 2021-08-25 PROCEDURE — 88305 TISSUE EXAM BY PATHOLOGIST: CPT | Mod: 26

## 2021-08-25 PROCEDURE — 22513 PERQ VERTEBRAL AUGMENTATION: CPT

## 2021-08-25 PROCEDURE — 88305 TISSUE EXAM BY PATHOLOGIST: CPT

## 2021-08-25 PROCEDURE — C1713: CPT

## 2021-08-25 RX ORDER — ACETAMINOPHEN 500 MG
1000 TABLET ORAL ONCE
Refills: 0 | Status: COMPLETED | OUTPATIENT
Start: 2021-08-25 | End: 2021-08-25

## 2021-08-25 RX ORDER — ONDANSETRON 8 MG/1
4 TABLET, FILM COATED ORAL ONCE
Refills: 0 | Status: DISCONTINUED | OUTPATIENT
Start: 2021-08-25 | End: 2021-08-25

## 2021-08-25 RX ORDER — FENTANYL CITRATE 50 UG/ML
25 INJECTION INTRAVENOUS
Refills: 0 | Status: DISCONTINUED | OUTPATIENT
Start: 2021-08-25 | End: 2021-08-25

## 2021-08-25 RX ADMIN — Medication 1000 MILLIGRAM(S): at 19:35

## 2021-08-25 RX ADMIN — Medication 400 MILLIGRAM(S): at 19:20

## 2021-08-25 NOTE — ASU DISCHARGE PLAN (ADULT/PEDIATRIC) - ASU DC SPECIAL INSTRUCTIONSFT
Vertebral Body Augmentation Discharge    Discharge Instructions  - You have had a T9 vertebral body augmentation.   - You may shower in 24 hours. No soaking or swimming until the site is completely healed.  - Keep the area covered and dry for the next 24 hours.  - Do not perform any heavy lifting for the next few days or until the site is healed.  - You may resume your normal diet.  - You may resume your normal medications however you should wait 48 hours before restarting aspirin, plavix, or blood thinners.  - It is normal to experience some pain over the site for the next few days. You may take apply ice to the area (20 minutes on, 20 minutes off) and take Tylenol for that pain. Do not take more frequently than every 6 hours and do not exceed more than 3000mg of Tylenol in a 24 hour period.    - You were given conscious sedation which may make you drowsy, therefore you need someone to stay with you until the morning following the procedure.  - Do not drive, engage in heavy lifting or strenuous activity, or drink any alcoholic beverages for the next 24 hours.   - You may resume normal activity in 24 hours.    Notify your primary physician and/or Interventional Radiology IMMEDIATELY if you experience any of the following       - Fever of 101F or 38C       - Chills or Rigors/ Shakes       - Swelling and/or Redness in the area around the biopsy site       - Worsening Pain       - Blood soaked bandages or worsening bleeding       - Lightheadedness and/or dizziness upon standing       - Chest Pain/ Tightness       - Shortness of Breath       - Difficulty walking    If you have a problem that you believe requires IMMEDIATE attention, please go to your NEAREST Emergency Room. If you believe your problem can safely wait until you speak to a physician, please call Interventional Radiology for any concerns.    During Normal Weekday Business Hours- You can contact the Interventional Radiology department during normal business hours via telephone.  During Evenings and Weekends- If you need to contact Interventional Radiology during off hours, do so by calling the hospital and requesting to be connected to the Interventional Radiologist on call.    Please call [phone] to schedule a follow up appointment with Dr. Travis in 2 weeks.

## 2021-08-25 NOTE — PROCEDURE NOTE - PLAN
- Recovery 2 hours  - Endocrinology and/or Rheumatology referral  - Follow up visit with Dr. Travis in 2 weeks in office

## 2021-08-25 NOTE — PRE PROCEDURE NOTE - PRE PROCEDURE EVALUATION
HPI: 89y Female with acute compression fracture of the T9 vertebral body with persistent pain despite conservative management.    Allergies:   Medications (Abx/Cardiac/Anticoagulation/Blood Products)      Data:    T(C): --  HR: --  BP: --  RR: --  SpO2: --    Exam  General: NAD. Resting in bed.  Chest: Nonlabored breathing    Imaging: Reviewed.    Plan:   -89y Female presents for T9 vertebral body augmentation.  -Risks/Benefits/alternatives explained with the patient and/or healthcare proxy and witnessed informed consent obtained.    HPI: 89y Female with acute compression fracture of the T9 vertebral body with persistent pain despite conservative management. Pain significantly impacting patients daily living and has trouble standing and walking which previously was able to do.    Allergies:   Medications (Abx/Cardiac/Anticoagulation/Blood Products)      Data:    T(C): --  HR: --  BP: --  RR: --  SpO2: --    Exam  General: NAD. Resting in bed.  Chest: Nonlabored breathing  Back: TTP at the mid to lower thoracic spine.    Imaging: Reviewed.    Plan:   -89y Female presents for T9 vertebral body augmentation.  -Risks/Benefits/alternatives explained with the patient and/or healthcare proxy and witnessed informed consent obtained.   -Discussed with patient the need for referral to endocrinology or rheumatology to manage bone health and the need for physical therapy after office visit to optimize benefit of procedure.  -Patient to follow up in office with Dr. Harvey in approximately 2 weeks.

## 2021-08-31 DIAGNOSIS — M80.88XA OTHER OSTEOPOROSIS WITH CURRENT PATHOLOGICAL FRACTURE, VERTEBRA(E), INITIAL ENCOUNTER FOR FRACTURE: ICD-10-CM

## 2021-09-02 LAB — SURGICAL PATHOLOGY STUDY: SIGNIFICANT CHANGE UP

## 2021-09-08 RX ORDER — ACETAMINOPHEN 650 MG/1
650 TABLET, FILM COATED, EXTENDED RELEASE ORAL
Qty: 100 | Refills: 1 | Status: ACTIVE | COMMUNITY
Start: 2021-09-08 | End: 1900-01-01

## 2021-09-10 ENCOUNTER — APPOINTMENT (OUTPATIENT)
Dept: RADIOLOGY | Facility: HOSPITAL | Age: 86
End: 2021-09-10

## 2021-09-10 ENCOUNTER — APPOINTMENT (OUTPATIENT)
Dept: INTERVENTIONAL RADIOLOGY/VASCULAR | Facility: CLINIC | Age: 86
End: 2021-09-10
Payer: MEDICARE

## 2021-09-10 ENCOUNTER — OUTPATIENT (OUTPATIENT)
Dept: OUTPATIENT SERVICES | Facility: HOSPITAL | Age: 86
LOS: 1 days | End: 2021-09-10
Payer: MEDICARE

## 2021-09-10 DIAGNOSIS — M48.50XA COLLAPSED VERTEBRA, NOT ELSEWHERE CLASSIFIED, SITE UNSPECIFIED, INITIAL ENCOUNTER FOR FRACTURE: ICD-10-CM

## 2021-09-10 DIAGNOSIS — S22.000A WEDGE COMPRESSION FRACTURE OF UNSPECIFIED THORACIC VERTEBRA, INITIAL ENCOUNTER FOR CLOSED FRACTURE: ICD-10-CM

## 2021-09-10 DIAGNOSIS — Z98.890 OTHER SPECIFIED POSTPROCEDURAL STATES: Chronic | ICD-10-CM

## 2021-09-10 PROCEDURE — 99214 OFFICE O/P EST MOD 30 MIN: CPT

## 2021-09-10 PROCEDURE — 72074 X-RAY EXAM THORAC SPINE4/>VW: CPT | Mod: 26

## 2021-09-20 ENCOUNTER — RX RENEWAL (OUTPATIENT)
Age: 86
End: 2021-09-20

## 2021-09-29 ENCOUNTER — APPOINTMENT (OUTPATIENT)
Dept: GERIATRICS | Facility: CLINIC | Age: 86
End: 2021-09-29
Payer: MEDICARE

## 2021-09-29 ENCOUNTER — APPOINTMENT (OUTPATIENT)
Dept: DERMATOLOGY | Facility: CLINIC | Age: 86
End: 2021-09-29

## 2021-09-29 VITALS
WEIGHT: 130.38 LBS | OXYGEN SATURATION: 95 % | RESPIRATION RATE: 14 BRPM | HEIGHT: 61 IN | SYSTOLIC BLOOD PRESSURE: 120 MMHG | TEMPERATURE: 95.7 F | DIASTOLIC BLOOD PRESSURE: 70 MMHG | BODY MASS INDEX: 24.62 KG/M2 | HEART RATE: 64 BPM

## 2021-09-29 DIAGNOSIS — S22.000A WEDGE COMPRESSION FRACTURE OF UNSPECIFIED THORACIC VERTEBRA, INITIAL ENCOUNTER FOR CLOSED FRACTURE: ICD-10-CM

## 2021-09-29 DIAGNOSIS — S22.079A UNSPECIFIED FRACTURE OF T9-T10 VERTEBRA, INITIAL ENCOUNTER FOR CLOSED FRACTURE: ICD-10-CM

## 2021-09-29 DIAGNOSIS — K59.00 CONSTIPATION, UNSPECIFIED: ICD-10-CM

## 2021-09-29 DIAGNOSIS — Z23 ENCOUNTER FOR IMMUNIZATION: ICD-10-CM

## 2021-09-29 DIAGNOSIS — Z98.890 OTHER SPECIFIED POSTPROCEDURAL STATES: ICD-10-CM

## 2021-09-29 DIAGNOSIS — R29.6 REPEATED FALLS: ICD-10-CM

## 2021-09-29 PROCEDURE — 90662 IIV NO PRSV INCREASED AG IM: CPT

## 2021-09-29 PROCEDURE — 99214 OFFICE O/P EST MOD 30 MIN: CPT | Mod: 25

## 2021-09-29 PROCEDURE — G0008: CPT

## 2021-09-29 RX ORDER — ELECTROLYTES/DEXTROSE
SOLUTION, ORAL ORAL DAILY
Refills: 0 | Status: ACTIVE | COMMUNITY
Start: 2021-09-29

## 2021-09-29 RX ORDER — ADHESIVE BANDAGE 3/4"
BANDAGE TOPICAL
Qty: 1 | Refills: 0 | Status: DISCONTINUED | OUTPATIENT
Start: 2021-07-27 | End: 2021-09-29

## 2021-09-29 RX ORDER — QUETIAPINE FUMARATE 25 MG/1
25 TABLET ORAL
Qty: 20 | Refills: 0 | Status: DISCONTINUED | COMMUNITY
Start: 2021-08-06 | End: 2021-09-29

## 2021-09-29 RX ORDER — BISACODYL 10 MG/1
10 SUPPOSITORY RECTAL DAILY
Qty: 3 | Refills: 0 | Status: DISCONTINUED | OUTPATIENT
Start: 2021-07-27 | End: 2021-09-29

## 2021-09-29 RX ORDER — DOCUSATE SODIUM 100 MG/1
100 CAPSULE ORAL
Qty: 90 | Refills: 2 | Status: ACTIVE | COMMUNITY
Start: 2017-04-21

## 2021-09-29 RX ORDER — LIDOCAINE 5% 700 MG/1
5 PATCH TOPICAL
Qty: 1 | Refills: 1 | Status: DISCONTINUED | COMMUNITY
Start: 2019-07-02 | End: 2021-09-29

## 2021-09-29 RX ORDER — SENNOSIDES 8.6 MG TABLETS 8.6 MG/1
8.6 TABLET ORAL
Qty: 60 | Refills: 0 | Status: DISCONTINUED | COMMUNITY
Start: 2018-08-21 | End: 2021-09-29

## 2021-09-29 RX ORDER — OXYCODONE 5 MG/1
5 TABLET ORAL
Qty: 40 | Refills: 0 | Status: DISCONTINUED | COMMUNITY
Start: 2021-07-30 | End: 2021-09-29

## 2021-09-29 RX ORDER — OXYCODONE HYDROCHLORIDE 10 MG/1
10 TABLET, FILM COATED, EXTENDED RELEASE ORAL
Qty: 10 | Refills: 0 | Status: DISCONTINUED | COMMUNITY
Start: 2021-08-04 | End: 2021-09-29

## 2021-09-29 NOTE — HISTORY OF PRESENT ILLNESS
[FreeTextEntry1] : 89yoF seen with her dtr and caregiver Volodymyr for follow up after vertebral compression fracture s/p vertebral augmentation of T9 on 8/25/21.\par \par since procedure mobility has greatly improved\par able to get out bed own her own\par bathroom by herself\par minimal assistance in shower\par only needing tylenol twice daily if that\par working with PT at home, but some limitations as she notes worse pain after exercise, but improving overall based on improved ADL's\par \par constipation: \par ibs-   DR. Tsang recommened much improved with 3 psyllium tablets with good improvement\par miralax gave her diarrhea\par only take bentyl once a week \par \par also with new bilateral hearing aides that was very helpful.\par \par blood pressure well controlled today. reports adherent with medications \par no changes in medications\par \par denies recent falls\par \par OP:\par started fosamax 11/2019 and tolerating well,\par Dexa due 2021\par  \par hx of multiple fragility fractures:\par pelvic fracture after fall 6/2019\par sept 2014 : pelvic fracture\par oct 2014 : coccyx fracture, L1 compression fracture - s/p kyphoplasty\par may 2015 : c2 fracture\par 2015 fell and was hospitalized at [facility], where all her w/u negaive, also had CT angio neck done which ruled out any injury to blood vessels from c2 fracture and she was discharged home.\par oct 2015 right proximal humerus fracture\par July 2018: L3 VCF on MRI\par \par hx of L cataract surgery\par Macular degeneration\par \par Chronic gait instability:\par walks with walker\par \par Afib / HTN / HLD\par - On Norvasc 5mg daily, Lisinopril 40mg daily, and Propranolol 40mg BID \par - On amiodarone\par - On Llipitor 20\par -not on AC due to falls and increase bleeding risk\par \par multiple skin cancers:\par BCC of nose, undergoing XRT as she opted against surgery\par denies bleeding or pain.\par \par hx of CVA\par hemorrhagic\par symptoms were loss of peripheral vision, which resolved\par \par hx of anxiety: was taking benzodiazepines. remains off medications and is stable \par \par

## 2021-09-29 NOTE — REASON FOR VISIT
[Follow-Up] : a follow-up visit [Formal Caregiver] : formal caregiver [Family Member] : family member [FreeTextEntry1] : VCF

## 2021-09-29 NOTE — PHYSICAL EXAM
[Normal] : alert, in no acute distress [Sclera] : the sclera and conjunctiva were normal [EOMI] : extraocular movements were intact [Normal Appearance] : the appearance of the neck was normal [Supple] : the neck was supple [No Respiratory Distress] : no respiratory distress [No Acc Muscle Use] : no accessory muscle use [Respiration, Rhythm And Depth] : normal respiratory rhythm and effort [Normal S1, S2] : normal S1 and S2 [Auscultation Breath Sounds / Voice Sounds] : lungs were clear to auscultation bilaterally [Heart Rate And Rhythm] : heart rate was normal and rhythm regular [Edema] : edema was not present [Bowel Sounds] : normal bowel sounds [Abdomen Tenderness] : non-tender [Abdomen Soft] : soft [No Clubbing, Cyanosis] : no clubbing or cyanosis of the fingernails [Involuntary Movements] : no involuntary movements were seen [No Focal Deficits] : no focal deficits [Normal Affect] : the affect was normal [Normal Mood] : the mood was normal [de-identified] : b/l hearing aides [de-identified] : wearing back brace

## 2021-09-29 NOTE — ASSESSMENT
[FreeTextEntry1] : VCF T9 s/p kyphoplasty doing Much better after procedure.  c/w tylenol daily and prn.  no longer on opiods\par ibs- fluctuates between diarrhea and constipation; has been taking bentyl for years, but rarely (2x montly). discussed risks associated with medication and PIM for geriatrics. family is aware and will be cautious with use. continue with psyllium and colace\par htn: controlled c/w current medications\par OP: with multiple fragility fractures, cw fosamax (started 2019, hx of taking prior for <1 yr)\par afib:controlled. remains on amio, follows with cardiology\par c/w walker use for unsteady gait\par hypothyroidism: stable c/w levo\par \par \par

## 2021-10-25 ENCOUNTER — APPOINTMENT (OUTPATIENT)
Dept: GERIATRICS | Facility: CLINIC | Age: 86
End: 2021-10-25
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
## 2021-11-09 PROBLEM — C44.629 SQUAMOUS CELL CARCINOMA OF SKIN OF LEFT UPPER ARM: Status: ACTIVE | Noted: 2021-01-01

## 2021-11-09 PROBLEM — D04.4: Status: ACTIVE | Noted: 2021-01-01

## 2021-12-02 PROBLEM — D22.9 ACQUIRED MELANOCYTIC NEVUS: Status: ACTIVE | Noted: 2021-07-03

## 2022-01-01 ENCOUNTER — APPOINTMENT (OUTPATIENT)
Dept: GERIATRICS | Facility: CLINIC | Age: 87
End: 2022-01-01

## 2022-01-01 ENCOUNTER — NON-APPOINTMENT (OUTPATIENT)
Age: 87
End: 2022-01-01

## 2022-01-01 ENCOUNTER — RX RENEWAL (OUTPATIENT)
Age: 87
End: 2022-01-01

## 2022-01-01 ENCOUNTER — LABORATORY RESULT (OUTPATIENT)
Age: 87
End: 2022-01-01

## 2022-01-01 ENCOUNTER — TRANSCRIPTION ENCOUNTER (OUTPATIENT)
Age: 87
End: 2022-01-01

## 2022-01-01 ENCOUNTER — APPOINTMENT (OUTPATIENT)
Dept: GERIATRICS | Facility: CLINIC | Age: 87
End: 2022-01-01
Payer: MEDICARE

## 2022-01-01 ENCOUNTER — APPOINTMENT (OUTPATIENT)
Dept: OPHTHALMOLOGY | Facility: AMBULATORY SURGERY CENTER | Age: 87
End: 2022-01-01
Payer: MEDICARE

## 2022-01-01 ENCOUNTER — APPOINTMENT (OUTPATIENT)
Dept: OPHTHALMOLOGY | Facility: CLINIC | Age: 87
End: 2022-01-01

## 2022-01-01 ENCOUNTER — APPOINTMENT (OUTPATIENT)
Dept: OPHTHALMOLOGY | Facility: CLINIC | Age: 87
End: 2022-01-01
Payer: MEDICARE

## 2022-01-01 ENCOUNTER — INPATIENT (INPATIENT)
Facility: HOSPITAL | Age: 87
LOS: 20 days | Discharge: HOME | End: 2022-10-19
Attending: HOSPITALIST | Admitting: HOSPITALIST

## 2022-01-01 ENCOUNTER — APPOINTMENT (OUTPATIENT)
Dept: DERMATOLOGY | Facility: CLINIC | Age: 87
End: 2022-01-01

## 2022-01-01 VITALS
HEIGHT: 61 IN | DIASTOLIC BLOOD PRESSURE: 74 MMHG | TEMPERATURE: 97.4 F | SYSTOLIC BLOOD PRESSURE: 142 MMHG | RESPIRATION RATE: 16 BRPM | HEART RATE: 72 BPM | OXYGEN SATURATION: 97 %

## 2022-01-01 VITALS
SYSTOLIC BLOOD PRESSURE: 130 MMHG | HEART RATE: 60 BPM | OXYGEN SATURATION: 97 % | TEMPERATURE: 97 F | DIASTOLIC BLOOD PRESSURE: 90 MMHG | RESPIRATION RATE: 16 BRPM

## 2022-01-01 VITALS
BODY MASS INDEX: 24.55 KG/M2 | HEIGHT: 61 IN | SYSTOLIC BLOOD PRESSURE: 142 MMHG | DIASTOLIC BLOOD PRESSURE: 80 MMHG | WEIGHT: 130 LBS | HEART RATE: 52 BPM | OXYGEN SATURATION: 97 % | TEMPERATURE: 98 F | RESPIRATION RATE: 6 BRPM

## 2022-01-01 VITALS
RESPIRATION RATE: 16 BRPM | DIASTOLIC BLOOD PRESSURE: 78 MMHG | OXYGEN SATURATION: 100 % | HEART RATE: 96 BPM | HEIGHT: 61 IN | TEMPERATURE: 98 F | SYSTOLIC BLOOD PRESSURE: 170 MMHG

## 2022-01-01 VITALS
OXYGEN SATURATION: 95 % | RESPIRATION RATE: 17 BRPM | HEART RATE: 57 BPM | TEMPERATURE: 98 F | SYSTOLIC BLOOD PRESSURE: 151 MMHG | DIASTOLIC BLOOD PRESSURE: 74 MMHG

## 2022-01-01 VITALS
TEMPERATURE: 98 F | SYSTOLIC BLOOD PRESSURE: 110 MMHG | RESPIRATION RATE: 16 BRPM | HEART RATE: 60 BPM | OXYGEN SATURATION: 98 % | HEIGHT: 61 IN | DIASTOLIC BLOOD PRESSURE: 80 MMHG

## 2022-01-01 DIAGNOSIS — Z85.828 PERSONAL HISTORY OF OTHER MALIGNANT NEOPLASM OF SKIN: ICD-10-CM

## 2022-01-01 DIAGNOSIS — M53.3 SACROCOCCYGEAL DISORDERS, NOT ELSEWHERE CLASSIFIED: ICD-10-CM

## 2022-01-01 DIAGNOSIS — Z63.6 DEPENDENT RELATIVE NEEDING CARE AT HOME: ICD-10-CM

## 2022-01-01 DIAGNOSIS — F03.90 UNSPECIFIED DEMENTIA W/OUT BEHAVIORAL DISTURBANCE: ICD-10-CM

## 2022-01-01 DIAGNOSIS — Z51.5 ENCOUNTER FOR PALLIATIVE CARE: ICD-10-CM

## 2022-01-01 DIAGNOSIS — Z85.89 PERSONAL HISTORY OF MALIGNANT NEOPLASM OF OTHER ORGANS AND SYSTEMS: ICD-10-CM

## 2022-01-01 DIAGNOSIS — R76.12 NONSPECIFIC REACTION TO CELL MEDIATED IMMUNITY MEASUREMENT OF GAMMA INTERFERON ANTIGEN RESPONSE W/OUT ACTIVE TUBERCULOSIS: ICD-10-CM

## 2022-01-01 DIAGNOSIS — R94.31 ABNORMAL ELECTROCARDIOGRAM [ECG] [EKG]: ICD-10-CM

## 2022-01-01 DIAGNOSIS — L57.0 ACTINIC KERATOSIS: ICD-10-CM

## 2022-01-01 DIAGNOSIS — R53.81 OTHER MALAISE: ICD-10-CM
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
## 2022-01-01 DIAGNOSIS — S81.801A UNSPECIFIED OPEN WOUND, RIGHT LOWER LEG, INITIAL ENCOUNTER: ICD-10-CM

## 2022-01-01 DIAGNOSIS — I10 ESSENTIAL (PRIMARY) HYPERTENSION: ICD-10-CM

## 2022-01-01 DIAGNOSIS — E03.9 HYPOTHYROIDISM, UNSPECIFIED: ICD-10-CM

## 2022-01-01 DIAGNOSIS — Z98.890 OTHER SPECIFIED POSTPROCEDURAL STATES: Chronic | ICD-10-CM

## 2022-01-01 DIAGNOSIS — R79.89 OTHER SPECIFIED ABNORMAL FINDINGS OF BLOOD CHEMISTRY: ICD-10-CM
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
## 2022-01-01 DIAGNOSIS — F51.05 INSOMNIA DUE TO OTHER MENTAL DISORDER: ICD-10-CM

## 2022-01-01 DIAGNOSIS — F03.90 UNSPECIFIED DEMENTIA, UNSPECIFIED SEVERITY, WITHOUT BEHAVIORAL DISTURBANCE, PSYCHOTIC DISTURBANCE, MOOD DISTURBANCE, AND ANXIETY: ICD-10-CM

## 2022-01-01 DIAGNOSIS — R26.0 ATAXIC GAIT: ICD-10-CM

## 2022-01-01 DIAGNOSIS — M54.2 CERVICALGIA: ICD-10-CM

## 2022-01-01 DIAGNOSIS — Z71.89 OTHER SPECIFIED COUNSELING: ICD-10-CM

## 2022-01-01 DIAGNOSIS — R26.89 OTHER ABNORMALITIES OF GAIT AND MOBILITY: ICD-10-CM

## 2022-01-01 DIAGNOSIS — Z01.818 ENCOUNTER FOR OTHER PREPROCEDURAL EXAMINATION: ICD-10-CM

## 2022-01-01 DIAGNOSIS — G93.49 OTHER ENCEPHALOPATHY: ICD-10-CM

## 2022-01-01 DIAGNOSIS — F03.91 UNSPECIFIED DEMENTIA WITH BEHAVIORAL DISTURBANCE: ICD-10-CM

## 2022-01-01 DIAGNOSIS — G47.01 INSOMNIA DUE TO MEDICAL CONDITION: ICD-10-CM

## 2022-01-01 DIAGNOSIS — I48.91 UNSPECIFIED ATRIAL FIBRILLATION: ICD-10-CM

## 2022-01-01 DIAGNOSIS — I48.20 CHRONIC ATRIAL FIBRILLATION, UNSPECIFIED: ICD-10-CM

## 2022-01-01 DIAGNOSIS — R45.1 RESTLESSNESS AND AGITATION: ICD-10-CM

## 2022-01-01 DIAGNOSIS — M81.0 AGE-RELATED OSTEOPOROSIS W/OUT CURRENT PATHOLOGICAL FRACTURE: ICD-10-CM

## 2022-01-01 DIAGNOSIS — R41.0 DISORIENTATION, UNSPECIFIED: ICD-10-CM

## 2022-01-01 DIAGNOSIS — F22 DELUSIONAL DISORDERS: ICD-10-CM

## 2022-01-01 DIAGNOSIS — R41.82 ALTERED MENTAL STATUS, UNSPECIFIED: ICD-10-CM

## 2022-01-01 DIAGNOSIS — R30.0 DYSURIA: ICD-10-CM

## 2022-01-01 DIAGNOSIS — Z29.9 ENCOUNTER FOR PROPHYLACTIC MEASURES, UNSPECIFIED: ICD-10-CM

## 2022-01-01 DIAGNOSIS — R26.81 UNSTEADINESS ON FEET: ICD-10-CM

## 2022-01-01 DIAGNOSIS — Z13.29 ENCOUNTER FOR SCREENING FOR OTHER SUSPECTED ENDOCRINE DISORDER: ICD-10-CM

## 2022-01-01 DIAGNOSIS — K58.9 IRRITABLE BOWEL SYNDROME W/OUT DIARRHEA: ICD-10-CM

## 2022-01-01 DIAGNOSIS — Z20.822 CONTACT WITH AND (SUSPECTED) EXPOSURE TO COVID-19: ICD-10-CM

## 2022-01-01 LAB
25(OH)D3 SERPL-MCNC: 26 NG/ML
25(OH)D3 SERPL-MCNC: 46.5 NG/ML
ALBUMIN SERPL ELPH-MCNC: 3.9 G/DL — SIGNIFICANT CHANGE UP (ref 3.3–5)
ALBUMIN SERPL ELPH-MCNC: 4.1 G/DL — SIGNIFICANT CHANGE UP (ref 3.3–5)
ALBUMIN SERPL ELPH-MCNC: 4.5 G/DL
ALBUMIN SERPL ELPH-MCNC: 4.6 G/DL
ALP BLD-CCNC: 69 U/L
ALP BLD-CCNC: 88 U/L
ALP SERPL-CCNC: 65 U/L — SIGNIFICANT CHANGE UP (ref 40–120)
ALP SERPL-CCNC: 71 U/L — SIGNIFICANT CHANGE UP (ref 40–120)
ALT FLD-CCNC: 14 U/L — SIGNIFICANT CHANGE UP (ref 4–33)
ALT FLD-CCNC: 15 U/L — SIGNIFICANT CHANGE UP (ref 4–33)
ALT SERPL-CCNC: 17 U/L
ALT SERPL-CCNC: 20 U/L
ANION GAP SERPL CALC-SCNC: 10 MMOL/L — SIGNIFICANT CHANGE UP (ref 7–14)
ANION GAP SERPL CALC-SCNC: 11 MMOL/L
ANION GAP SERPL CALC-SCNC: 11 MMOL/L — SIGNIFICANT CHANGE UP (ref 7–14)
ANION GAP SERPL CALC-SCNC: 12 MMOL/L — SIGNIFICANT CHANGE UP (ref 7–14)
ANION GAP SERPL CALC-SCNC: 12 MMOL/L — SIGNIFICANT CHANGE UP (ref 7–14)
ANION GAP SERPL CALC-SCNC: 13 MMOL/L — SIGNIFICANT CHANGE UP (ref 7–14)
ANION GAP SERPL CALC-SCNC: 13 MMOL/L — SIGNIFICANT CHANGE UP (ref 7–14)
ANION GAP SERPL CALC-SCNC: 14 MMOL/L
ANION GAP SERPL CALC-SCNC: 14 MMOL/L — SIGNIFICANT CHANGE UP (ref 7–14)
APPEARANCE UR: CLEAR — SIGNIFICANT CHANGE UP
APPEARANCE: CLEAR
APTT BLD: 27.2 SEC — SIGNIFICANT CHANGE UP (ref 27–36.3)
AST SERPL-CCNC: 25 U/L
AST SERPL-CCNC: 26 U/L — SIGNIFICANT CHANGE UP (ref 4–32)
AST SERPL-CCNC: 27 U/L — SIGNIFICANT CHANGE UP (ref 4–32)
AST SERPL-CCNC: 30 U/L
B PERT DNA SPEC QL NAA+PROBE: SIGNIFICANT CHANGE UP
B PERT+PARAPERT DNA PNL SPEC NAA+PROBE: SIGNIFICANT CHANGE UP
BASOPHILS # BLD AUTO: 0.02 K/UL
BASOPHILS # BLD AUTO: 0.04 K/UL — SIGNIFICANT CHANGE UP (ref 0–0.2)
BASOPHILS NFR BLD AUTO: 0.3 %
BASOPHILS NFR BLD AUTO: 0.6 % — SIGNIFICANT CHANGE UP (ref 0–2)
BILIRUB SERPL-MCNC: 0.2 MG/DL
BILIRUB SERPL-MCNC: 0.2 MG/DL — SIGNIFICANT CHANGE UP (ref 0.2–1.2)
BILIRUB SERPL-MCNC: 0.3 MG/DL
BILIRUB SERPL-MCNC: 0.3 MG/DL — SIGNIFICANT CHANGE UP (ref 0.2–1.2)
BILIRUB UR-MCNC: NEGATIVE — SIGNIFICANT CHANGE UP
BILIRUBIN URINE: NEGATIVE
BLOOD URINE: NEGATIVE
BORDETELLA PARAPERTUSSIS (RAPRVP): SIGNIFICANT CHANGE UP
BUN SERPL-MCNC: 17 MG/DL
BUN SERPL-MCNC: 18 MG/DL — SIGNIFICANT CHANGE UP (ref 7–23)
BUN SERPL-MCNC: 19 MG/DL — SIGNIFICANT CHANGE UP (ref 7–23)
BUN SERPL-MCNC: 22 MG/DL
BUN SERPL-MCNC: 24 MG/DL — HIGH (ref 7–23)
BUN SERPL-MCNC: 25 MG/DL — HIGH (ref 7–23)
BUN SERPL-MCNC: 26 MG/DL — HIGH (ref 7–23)
BUN SERPL-MCNC: 28 MG/DL — HIGH (ref 7–23)
BUN SERPL-MCNC: 29 MG/DL — HIGH (ref 7–23)
BUN SERPL-MCNC: 30 MG/DL — HIGH (ref 7–23)
BUN SERPL-MCNC: 32 MG/DL — HIGH (ref 7–23)
C PNEUM DNA SPEC QL NAA+PROBE: SIGNIFICANT CHANGE UP
CALCIUM SERPL-MCNC: 8.8 MG/DL — SIGNIFICANT CHANGE UP (ref 8.4–10.5)
CALCIUM SERPL-MCNC: 8.9 MG/DL — SIGNIFICANT CHANGE UP (ref 8.4–10.5)
CALCIUM SERPL-MCNC: 9 MG/DL — SIGNIFICANT CHANGE UP (ref 8.4–10.5)
CALCIUM SERPL-MCNC: 9.1 MG/DL — SIGNIFICANT CHANGE UP (ref 8.4–10.5)
CALCIUM SERPL-MCNC: 9.2 MG/DL — SIGNIFICANT CHANGE UP (ref 8.4–10.5)
CALCIUM SERPL-MCNC: 9.2 MG/DL — SIGNIFICANT CHANGE UP (ref 8.4–10.5)
CALCIUM SERPL-MCNC: 9.5 MG/DL
CALCIUM SERPL-MCNC: 9.5 MG/DL
CHLORIDE SERPL-SCNC: 102 MMOL/L
CHLORIDE SERPL-SCNC: 102 MMOL/L — SIGNIFICANT CHANGE UP (ref 98–107)
CHLORIDE SERPL-SCNC: 103 MMOL/L — SIGNIFICANT CHANGE UP (ref 98–107)
CHLORIDE SERPL-SCNC: 104 MMOL/L
CHLORIDE SERPL-SCNC: 104 MMOL/L — SIGNIFICANT CHANGE UP (ref 98–107)
CHLORIDE SERPL-SCNC: 105 MMOL/L — SIGNIFICANT CHANGE UP (ref 98–107)
CHLORIDE SERPL-SCNC: 106 MMOL/L — SIGNIFICANT CHANGE UP (ref 98–107)
CHLORIDE SERPL-SCNC: 108 MMOL/L — HIGH (ref 98–107)
CO2 SERPL-SCNC: 22 MMOL/L — SIGNIFICANT CHANGE UP (ref 22–31)
CO2 SERPL-SCNC: 24 MMOL/L — SIGNIFICANT CHANGE UP (ref 22–31)
CO2 SERPL-SCNC: 25 MMOL/L — SIGNIFICANT CHANGE UP (ref 22–31)
CO2 SERPL-SCNC: 26 MMOL/L
CO2 SERPL-SCNC: 26 MMOL/L — SIGNIFICANT CHANGE UP (ref 22–31)
CO2 SERPL-SCNC: 26 MMOL/L — SIGNIFICANT CHANGE UP (ref 22–31)
CO2 SERPL-SCNC: 27 MMOL/L
CO2 SERPL-SCNC: 27 MMOL/L — SIGNIFICANT CHANGE UP (ref 22–31)
CO2 SERPL-SCNC: 27 MMOL/L — SIGNIFICANT CHANGE UP (ref 22–31)
COLOR SPEC: YELLOW — SIGNIFICANT CHANGE UP
COLOR: YELLOW
CREAT SERPL-MCNC: 0.77 MG/DL — SIGNIFICANT CHANGE UP (ref 0.5–1.3)
CREAT SERPL-MCNC: 0.83 MG/DL — SIGNIFICANT CHANGE UP (ref 0.5–1.3)
CREAT SERPL-MCNC: 0.89 MG/DL — SIGNIFICANT CHANGE UP (ref 0.5–1.3)
CREAT SERPL-MCNC: 0.95 MG/DL — SIGNIFICANT CHANGE UP (ref 0.5–1.3)
CREAT SERPL-MCNC: 1 MG/DL — SIGNIFICANT CHANGE UP (ref 0.5–1.3)
CREAT SERPL-MCNC: 1.01 MG/DL — SIGNIFICANT CHANGE UP (ref 0.5–1.3)
CREAT SERPL-MCNC: 1.08 MG/DL
CREAT SERPL-MCNC: 1.15 MG/DL
CREAT SERPL-MCNC: 1.15 MG/DL — SIGNIFICANT CHANGE UP (ref 0.5–1.3)
CREAT SERPL-MCNC: 1.19 MG/DL — SIGNIFICANT CHANGE UP (ref 0.5–1.3)
CREAT SERPL-MCNC: 1.21 MG/DL — SIGNIFICANT CHANGE UP (ref 0.5–1.3)
CULTURE RESULTS: SIGNIFICANT CHANGE UP
D DIMER BLD IA.RAPID-MCNC: 594 NG/ML DDU — HIGH
DIFF PNL FLD: NEGATIVE — SIGNIFICANT CHANGE UP
EGFR: 41 ML/MIN/1.73M2 — LOW
EGFR: 42 ML/MIN/1.73M2 — LOW
EGFR: 44 ML/MIN/1.73M2 — LOW
EGFR: 47 ML/MIN/1.73M2
EGFR: 51 ML/MIN/1.73M2 — LOW
EGFR: 52 ML/MIN/1.73M2 — LOW
EGFR: 55 ML/MIN/1.73M2 — LOW
EGFR: 59 ML/MIN/1.73M2 — LOW
EGFR: 64 ML/MIN/1.73M2 — SIGNIFICANT CHANGE UP
EGFR: 71 ML/MIN/1.73M2 — SIGNIFICANT CHANGE UP
EOSINOPHIL # BLD AUTO: 0.05 K/UL
EOSINOPHIL # BLD AUTO: 0.1 K/UL — SIGNIFICANT CHANGE UP (ref 0–0.5)
EOSINOPHIL NFR BLD AUTO: 0.8 %
EOSINOPHIL NFR BLD AUTO: 1.5 % — SIGNIFICANT CHANGE UP (ref 0–6)
FLUAV SUBTYP SPEC NAA+PROBE: SIGNIFICANT CHANGE UP
FLUBV RNA SPEC QL NAA+PROBE: SIGNIFICANT CHANGE UP
FOLATE SERPL-MCNC: 20 NG/ML — HIGH (ref 3.1–17.5)
FOLATE SERPL-MCNC: >20 NG/ML
GLUCOSE QUALITATIVE U: NEGATIVE
GLUCOSE SERPL-MCNC: 100 MG/DL — HIGH (ref 70–99)
GLUCOSE SERPL-MCNC: 104 MG/DL — HIGH (ref 70–99)
GLUCOSE SERPL-MCNC: 108 MG/DL — HIGH (ref 70–99)
GLUCOSE SERPL-MCNC: 111 MG/DL — HIGH (ref 70–99)
GLUCOSE SERPL-MCNC: 113 MG/DL — HIGH (ref 70–99)
GLUCOSE SERPL-MCNC: 143 MG/DL — HIGH (ref 70–99)
GLUCOSE SERPL-MCNC: 77 MG/DL
GLUCOSE SERPL-MCNC: 91 MG/DL — SIGNIFICANT CHANGE UP (ref 70–99)
GLUCOSE SERPL-MCNC: 92 MG/DL — SIGNIFICANT CHANGE UP (ref 70–99)
GLUCOSE SERPL-MCNC: 93 MG/DL
GLUCOSE SERPL-MCNC: 99 MG/DL — SIGNIFICANT CHANGE UP (ref 70–99)
GLUCOSE UR QL: NEGATIVE — SIGNIFICANT CHANGE UP
HADV DNA SPEC QL NAA+PROBE: SIGNIFICANT CHANGE UP
HCOV 229E RNA SPEC QL NAA+PROBE: SIGNIFICANT CHANGE UP
HCOV HKU1 RNA SPEC QL NAA+PROBE: SIGNIFICANT CHANGE UP
HCOV NL63 RNA SPEC QL NAA+PROBE: SIGNIFICANT CHANGE UP
HCOV OC43 RNA SPEC QL NAA+PROBE: SIGNIFICANT CHANGE UP
HCT VFR BLD CALC: 33.1 % — LOW (ref 34.5–45)
HCT VFR BLD CALC: 35.7 % — SIGNIFICANT CHANGE UP (ref 34.5–45)
HCT VFR BLD CALC: 36.4 % — SIGNIFICANT CHANGE UP (ref 34.5–45)
HCT VFR BLD CALC: 36.8 % — SIGNIFICANT CHANGE UP (ref 34.5–45)
HCT VFR BLD CALC: 37.6 % — SIGNIFICANT CHANGE UP (ref 34.5–45)
HCT VFR BLD CALC: 38 % — SIGNIFICANT CHANGE UP (ref 34.5–45)
HCT VFR BLD CALC: 39.1 % — SIGNIFICANT CHANGE UP (ref 34.5–45)
HCT VFR BLD CALC: 39.3 %
HCT VFR BLD CALC: 39.3 % — SIGNIFICANT CHANGE UP (ref 34.5–45)
HCT VFR BLD CALC: 39.6 % — SIGNIFICANT CHANGE UP (ref 34.5–45)
HGB BLD-MCNC: 11.1 G/DL — LOW (ref 11.5–15.5)
HGB BLD-MCNC: 11.8 G/DL — SIGNIFICANT CHANGE UP (ref 11.5–15.5)
HGB BLD-MCNC: 11.9 G/DL — SIGNIFICANT CHANGE UP (ref 11.5–15.5)
HGB BLD-MCNC: 11.9 G/DL — SIGNIFICANT CHANGE UP (ref 11.5–15.5)
HGB BLD-MCNC: 12.3 G/DL — SIGNIFICANT CHANGE UP (ref 11.5–15.5)
HGB BLD-MCNC: 12.5 G/DL
HGB BLD-MCNC: 12.5 G/DL — SIGNIFICANT CHANGE UP (ref 11.5–15.5)
HGB BLD-MCNC: 12.7 G/DL — SIGNIFICANT CHANGE UP (ref 11.5–15.5)
HGB BLD-MCNC: 12.7 G/DL — SIGNIFICANT CHANGE UP (ref 11.5–15.5)
HGB BLD-MCNC: 12.8 G/DL — SIGNIFICANT CHANGE UP (ref 11.5–15.5)
HMPV RNA SPEC QL NAA+PROBE: SIGNIFICANT CHANGE UP
HPIV1 RNA SPEC QL NAA+PROBE: SIGNIFICANT CHANGE UP
HPIV2 RNA SPEC QL NAA+PROBE: SIGNIFICANT CHANGE UP
HPIV3 RNA SPEC QL NAA+PROBE: SIGNIFICANT CHANGE UP
HPIV4 RNA SPEC QL NAA+PROBE: SIGNIFICANT CHANGE UP
IANC: 4.07 K/UL — SIGNIFICANT CHANGE UP (ref 1.8–7.4)
IMM GRANULOCYTES NFR BLD AUTO: 0.8 %
IMM GRANULOCYTES NFR BLD AUTO: 0.8 % — SIGNIFICANT CHANGE UP (ref 0–0.9)
INR BLD: 0.99 RATIO — SIGNIFICANT CHANGE UP (ref 0.88–1.16)
KETONES UR-MCNC: NEGATIVE — SIGNIFICANT CHANGE UP
KETONES URINE: NEGATIVE
LDLC SERPL DIRECT ASSAY-MCNC: 117 MG/DL
LEUKOCYTE ESTERASE UR-ACNC: NEGATIVE — SIGNIFICANT CHANGE UP
LEUKOCYTE ESTERASE URINE: ABNORMAL
LYMPHOCYTES # BLD AUTO: 1.45 K/UL
LYMPHOCYTES # BLD AUTO: 1.5 K/UL — SIGNIFICANT CHANGE UP (ref 1–3.3)
LYMPHOCYTES # BLD AUTO: 23.1 % — SIGNIFICANT CHANGE UP (ref 13–44)
LYMPHOCYTES NFR BLD AUTO: 24.5 %
M PNEUMO DNA SPEC QL NAA+PROBE: SIGNIFICANT CHANGE UP
MAGNESIUM SERPL-MCNC: 2 MG/DL — SIGNIFICANT CHANGE UP (ref 1.6–2.6)
MAGNESIUM SERPL-MCNC: 2.1 MG/DL — SIGNIFICANT CHANGE UP (ref 1.6–2.6)
MAGNESIUM SERPL-MCNC: 2.2 MG/DL — SIGNIFICANT CHANGE UP (ref 1.6–2.6)
MAGNESIUM SERPL-MCNC: 2.3 MG/DL — SIGNIFICANT CHANGE UP (ref 1.6–2.6)
MAN DIFF?: NORMAL
MCHC RBC-ENTMCNC: 30.3 PG — SIGNIFICANT CHANGE UP (ref 27–34)
MCHC RBC-ENTMCNC: 30.5 PG — SIGNIFICANT CHANGE UP (ref 27–34)
MCHC RBC-ENTMCNC: 30.7 PG — SIGNIFICANT CHANGE UP (ref 27–34)
MCHC RBC-ENTMCNC: 30.8 PG — SIGNIFICANT CHANGE UP (ref 27–34)
MCHC RBC-ENTMCNC: 30.9 PG
MCHC RBC-ENTMCNC: 30.9 PG — SIGNIFICANT CHANGE UP (ref 27–34)
MCHC RBC-ENTMCNC: 31 PG — SIGNIFICANT CHANGE UP (ref 27–34)
MCHC RBC-ENTMCNC: 31 PG — SIGNIFICANT CHANGE UP (ref 27–34)
MCHC RBC-ENTMCNC: 31.1 PG — SIGNIFICANT CHANGE UP (ref 27–34)
MCHC RBC-ENTMCNC: 31.1 PG — SIGNIFICANT CHANGE UP (ref 27–34)
MCHC RBC-ENTMCNC: 31.8 GM/DL
MCHC RBC-ENTMCNC: 32 GM/DL — SIGNIFICANT CHANGE UP (ref 32–36)
MCHC RBC-ENTMCNC: 32.3 GM/DL — SIGNIFICANT CHANGE UP (ref 32–36)
MCHC RBC-ENTMCNC: 32.7 GM/DL — SIGNIFICANT CHANGE UP (ref 32–36)
MCHC RBC-ENTMCNC: 32.7 GM/DL — SIGNIFICANT CHANGE UP (ref 32–36)
MCHC RBC-ENTMCNC: 33.1 GM/DL — SIGNIFICANT CHANGE UP (ref 32–36)
MCHC RBC-ENTMCNC: 33.4 GM/DL — SIGNIFICANT CHANGE UP (ref 32–36)
MCHC RBC-ENTMCNC: 33.5 GM/DL — SIGNIFICANT CHANGE UP (ref 32–36)
MCV RBC AUTO: 92.2 FL — SIGNIFICANT CHANGE UP (ref 80–100)
MCV RBC AUTO: 92.5 FL — SIGNIFICANT CHANGE UP (ref 80–100)
MCV RBC AUTO: 92.5 FL — SIGNIFICANT CHANGE UP (ref 80–100)
MCV RBC AUTO: 93.8 FL — SIGNIFICANT CHANGE UP (ref 80–100)
MCV RBC AUTO: 94.7 FL — SIGNIFICANT CHANGE UP (ref 80–100)
MCV RBC AUTO: 94.9 FL — SIGNIFICANT CHANGE UP (ref 80–100)
MCV RBC AUTO: 95.2 FL — SIGNIFICANT CHANGE UP (ref 80–100)
MCV RBC AUTO: 95.8 FL — SIGNIFICANT CHANGE UP (ref 80–100)
MCV RBC AUTO: 96.1 FL — SIGNIFICANT CHANGE UP (ref 80–100)
MCV RBC AUTO: 97.3 FL
MONOCYTES # BLD AUTO: 0.72 K/UL — SIGNIFICANT CHANGE UP (ref 0–0.9)
MONOCYTES # BLD AUTO: 0.85 K/UL
MONOCYTES NFR BLD AUTO: 11.1 % — SIGNIFICANT CHANGE UP (ref 2–14)
MONOCYTES NFR BLD AUTO: 14.3 %
NEUTROPHILS # BLD AUTO: 3.51 K/UL
NEUTROPHILS # BLD AUTO: 4.07 K/UL — SIGNIFICANT CHANGE UP (ref 1.8–7.4)
NEUTROPHILS NFR BLD AUTO: 59.3 %
NEUTROPHILS NFR BLD AUTO: 62.9 % — SIGNIFICANT CHANGE UP (ref 43–77)
NITRITE UR-MCNC: NEGATIVE — SIGNIFICANT CHANGE UP
NITRITE URINE: NEGATIVE
NRBC # BLD: 0 /100 WBCS — SIGNIFICANT CHANGE UP (ref 0–0)
NRBC # FLD: 0 K/UL — SIGNIFICANT CHANGE UP (ref 0–0)
PH UR: 6.5 — SIGNIFICANT CHANGE UP (ref 5–8)
PH URINE: 7
PHOSPHATE SERPL-MCNC: 3.2 MG/DL — SIGNIFICANT CHANGE UP (ref 2.5–4.5)
PHOSPHATE SERPL-MCNC: 3.3 MG/DL — SIGNIFICANT CHANGE UP (ref 2.5–4.5)
PHOSPHATE SERPL-MCNC: 3.3 MG/DL — SIGNIFICANT CHANGE UP (ref 2.5–4.5)
PHOSPHATE SERPL-MCNC: 3.4 MG/DL — SIGNIFICANT CHANGE UP (ref 2.5–4.5)
PHOSPHATE SERPL-MCNC: 3.5 MG/DL — SIGNIFICANT CHANGE UP (ref 2.5–4.5)
PHOSPHATE SERPL-MCNC: 4.3 MG/DL — SIGNIFICANT CHANGE UP (ref 2.5–4.5)
PLATELET # BLD AUTO: 191 K/UL — SIGNIFICANT CHANGE UP (ref 150–400)
PLATELET # BLD AUTO: 201 K/UL — SIGNIFICANT CHANGE UP (ref 150–400)
PLATELET # BLD AUTO: 210 K/UL — SIGNIFICANT CHANGE UP (ref 150–400)
PLATELET # BLD AUTO: 217 K/UL — SIGNIFICANT CHANGE UP (ref 150–400)
PLATELET # BLD AUTO: 218 K/UL — SIGNIFICANT CHANGE UP (ref 150–400)
PLATELET # BLD AUTO: 224 K/UL
PLATELET # BLD AUTO: 228 K/UL — SIGNIFICANT CHANGE UP (ref 150–400)
PLATELET # BLD AUTO: 230 K/UL — SIGNIFICANT CHANGE UP (ref 150–400)
PLATELET # BLD AUTO: 230 K/UL — SIGNIFICANT CHANGE UP (ref 150–400)
PLATELET # BLD AUTO: 235 K/UL — SIGNIFICANT CHANGE UP (ref 150–400)
POTASSIUM SERPL-MCNC: 3.6 MMOL/L — SIGNIFICANT CHANGE UP (ref 3.5–5.3)
POTASSIUM SERPL-MCNC: 3.7 MMOL/L — SIGNIFICANT CHANGE UP (ref 3.5–5.3)
POTASSIUM SERPL-MCNC: 3.7 MMOL/L — SIGNIFICANT CHANGE UP (ref 3.5–5.3)
POTASSIUM SERPL-MCNC: 3.8 MMOL/L — SIGNIFICANT CHANGE UP (ref 3.5–5.3)
POTASSIUM SERPL-MCNC: 4.3 MMOL/L — SIGNIFICANT CHANGE UP (ref 3.5–5.3)
POTASSIUM SERPL-SCNC: 3.6 MMOL/L — SIGNIFICANT CHANGE UP (ref 3.5–5.3)
POTASSIUM SERPL-SCNC: 3.7 MMOL/L — SIGNIFICANT CHANGE UP (ref 3.5–5.3)
POTASSIUM SERPL-SCNC: 3.7 MMOL/L — SIGNIFICANT CHANGE UP (ref 3.5–5.3)
POTASSIUM SERPL-SCNC: 3.8 MMOL/L — SIGNIFICANT CHANGE UP (ref 3.5–5.3)
POTASSIUM SERPL-SCNC: 4.3 MMOL/L — SIGNIFICANT CHANGE UP (ref 3.5–5.3)
POTASSIUM SERPL-SCNC: 4.5 MMOL/L
POTASSIUM SERPL-SCNC: 5 MMOL/L
PROT SERPL-MCNC: 7.3 G/DL — SIGNIFICANT CHANGE UP (ref 6–8.3)
PROT SERPL-MCNC: 7.4 G/DL — SIGNIFICANT CHANGE UP (ref 6–8.3)
PROT SERPL-MCNC: 7.5 G/DL
PROT SERPL-MCNC: 7.7 G/DL
PROT UR-MCNC: ABNORMAL
PROTEIN URINE: ABNORMAL
PROTHROM AB SERPL-ACNC: 11.5 SEC — SIGNIFICANT CHANGE UP (ref 10.5–13.4)
RAPID RVP RESULT: SIGNIFICANT CHANGE UP
RBC # BLD: 3.58 M/UL — LOW (ref 3.8–5.2)
RBC # BLD: 3.84 M/UL — SIGNIFICANT CHANGE UP (ref 3.8–5.2)
RBC # BLD: 3.87 M/UL — SIGNIFICANT CHANGE UP (ref 3.8–5.2)
RBC # BLD: 3.88 M/UL — SIGNIFICANT CHANGE UP (ref 3.8–5.2)
RBC # BLD: 3.96 M/UL — SIGNIFICANT CHANGE UP (ref 3.8–5.2)
RBC # BLD: 4.04 M/UL
RBC # BLD: 4.11 M/UL — SIGNIFICANT CHANGE UP (ref 3.8–5.2)
RBC # BLD: 4.12 M/UL — SIGNIFICANT CHANGE UP (ref 3.8–5.2)
RBC # BLD: 4.13 M/UL — SIGNIFICANT CHANGE UP (ref 3.8–5.2)
RBC # BLD: 4.13 M/UL — SIGNIFICANT CHANGE UP (ref 3.8–5.2)
RBC # FLD: 13.1 % — SIGNIFICANT CHANGE UP (ref 10.3–14.5)
RBC # FLD: 13.2 %
RBC # FLD: 13.2 % — SIGNIFICANT CHANGE UP (ref 10.3–14.5)
RBC # FLD: 13.4 % — SIGNIFICANT CHANGE UP (ref 10.3–14.5)
RBC # FLD: 13.4 % — SIGNIFICANT CHANGE UP (ref 10.3–14.5)
RBC # FLD: 13.5 % — SIGNIFICANT CHANGE UP (ref 10.3–14.5)
RBC # FLD: 13.6 % — SIGNIFICANT CHANGE UP (ref 10.3–14.5)
RBC # FLD: 13.7 % — SIGNIFICANT CHANGE UP (ref 10.3–14.5)
RSV RNA SPEC QL NAA+PROBE: SIGNIFICANT CHANGE UP
RV+EV RNA SPEC QL NAA+PROBE: SIGNIFICANT CHANGE UP
SARS-COV-2 RNA SPEC QL NAA+PROBE: DETECTED
SARS-COV-2 RNA SPEC QL NAA+PROBE: DETECTED
SARS-COV-2 RNA SPEC QL NAA+PROBE: SIGNIFICANT CHANGE UP
SODIUM SERPL-SCNC: 140 MMOL/L — SIGNIFICANT CHANGE UP (ref 135–145)
SODIUM SERPL-SCNC: 141 MMOL/L — SIGNIFICANT CHANGE UP (ref 135–145)
SODIUM SERPL-SCNC: 141 MMOL/L — SIGNIFICANT CHANGE UP (ref 135–145)
SODIUM SERPL-SCNC: 142 MMOL/L
SODIUM SERPL-SCNC: 142 MMOL/L
SODIUM SERPL-SCNC: 142 MMOL/L — SIGNIFICANT CHANGE UP (ref 135–145)
SODIUM SERPL-SCNC: 142 MMOL/L — SIGNIFICANT CHANGE UP (ref 135–145)
SODIUM SERPL-SCNC: 143 MMOL/L — SIGNIFICANT CHANGE UP (ref 135–145)
SODIUM SERPL-SCNC: 144 MMOL/L — SIGNIFICANT CHANGE UP (ref 135–145)
SODIUM SERPL-SCNC: 145 MMOL/L — SIGNIFICANT CHANGE UP (ref 135–145)
SODIUM SERPL-SCNC: 146 MMOL/L — HIGH (ref 135–145)
SP GR SPEC: 1.02 — SIGNIFICANT CHANGE UP (ref 1.01–1.05)
SPECIFIC GRAVITY URINE: 1.02
SPECIMEN SOURCE: SIGNIFICANT CHANGE UP
T3 SERPL-MCNC: 37 NG/DL — LOW (ref 80–200)
T4 AB SER-ACNC: 10.41 UG/DL — SIGNIFICANT CHANGE UP (ref 5.1–13)
T4 FREE SERPL DIALY-MCNC: 2.7 NG/DL — HIGH
T4 FREE SERPL-MCNC: 1.8 NG/DL — SIGNIFICANT CHANGE UP (ref 0.9–1.8)
THYROPEROXIDASE AB SERPL-ACNC: <10 IU/ML — SIGNIFICANT CHANGE UP
TSH SERPL-ACNC: 12.3 UIU/ML
TSH SERPL-ACNC: 14.8 UIU/ML
TSH SERPL-MCNC: 19.73 UIU/ML — HIGH (ref 0.27–4.2)
TSH SERPL-MCNC: 6.14 UIU/ML — HIGH (ref 0.27–4.2)
UROBILINOGEN FLD QL: SIGNIFICANT CHANGE UP
UROBILINOGEN URINE: NORMAL
VALPROATE SERPL-MCNC: 18.5 UG/ML — LOW (ref 50–100)
VIT B12 SERPL-MCNC: 643 PG/ML — SIGNIFICANT CHANGE UP (ref 200–900)
VIT B12 SERPL-MCNC: 651 PG/ML
WBC # BLD: 5.44 K/UL — SIGNIFICANT CHANGE UP (ref 3.8–10.5)
WBC # BLD: 5.5 K/UL — SIGNIFICANT CHANGE UP (ref 3.8–10.5)
WBC # BLD: 5.59 K/UL — SIGNIFICANT CHANGE UP (ref 3.8–10.5)
WBC # BLD: 5.74 K/UL — SIGNIFICANT CHANGE UP (ref 3.8–10.5)
WBC # BLD: 6.37 K/UL — SIGNIFICANT CHANGE UP (ref 3.8–10.5)
WBC # BLD: 6.48 K/UL — SIGNIFICANT CHANGE UP (ref 3.8–10.5)
WBC # BLD: 6.51 K/UL — SIGNIFICANT CHANGE UP (ref 3.8–10.5)
WBC # BLD: 6.54 K/UL — SIGNIFICANT CHANGE UP (ref 3.8–10.5)
WBC # BLD: 6.83 K/UL — SIGNIFICANT CHANGE UP (ref 3.8–10.5)
WBC # FLD AUTO: 5.44 K/UL — SIGNIFICANT CHANGE UP (ref 3.8–10.5)
WBC # FLD AUTO: 5.5 K/UL — SIGNIFICANT CHANGE UP (ref 3.8–10.5)
WBC # FLD AUTO: 5.59 K/UL — SIGNIFICANT CHANGE UP (ref 3.8–10.5)
WBC # FLD AUTO: 5.74 K/UL — SIGNIFICANT CHANGE UP (ref 3.8–10.5)
WBC # FLD AUTO: 5.93 K/UL
WBC # FLD AUTO: 6.37 K/UL — SIGNIFICANT CHANGE UP (ref 3.8–10.5)
WBC # FLD AUTO: 6.48 K/UL — SIGNIFICANT CHANGE UP (ref 3.8–10.5)
WBC # FLD AUTO: 6.51 K/UL — SIGNIFICANT CHANGE UP (ref 3.8–10.5)
WBC # FLD AUTO: 6.54 K/UL — SIGNIFICANT CHANGE UP (ref 3.8–10.5)
WBC # FLD AUTO: 6.83 K/UL — SIGNIFICANT CHANGE UP (ref 3.8–10.5)

## 2022-01-01 PROCEDURE — 99215 OFFICE O/P EST HI 40 MIN: CPT | Mod: 95

## 2022-01-01 PROCEDURE — 99233 SBSQ HOSP IP/OBS HIGH 50: CPT

## 2022-01-01 PROCEDURE — 99448 NTRPROF PH1/NTRNET/EHR 21-30: CPT

## 2022-01-01 PROCEDURE — 92136 OPHTHALMIC BIOMETRY: CPT

## 2022-01-01 PROCEDURE — 99214 OFFICE O/P EST MOD 30 MIN: CPT | Mod: 95

## 2022-01-01 PROCEDURE — 70450 CT HEAD/BRAIN W/O DYE: CPT | Mod: 26

## 2022-01-01 PROCEDURE — 99214 OFFICE O/P EST MOD 30 MIN: CPT | Mod: 25

## 2022-01-01 PROCEDURE — 99239 HOSP IP/OBS DSCHRG MGMT >30: CPT

## 2022-01-01 PROCEDURE — G2212 PROLONG OUTPT/OFFICE VIS: CPT | Mod: 95

## 2022-01-01 PROCEDURE — 99214 OFFICE O/P EST MOD 30 MIN: CPT | Mod: GC

## 2022-01-01 PROCEDURE — 99232 SBSQ HOSP IP/OBS MODERATE 35: CPT

## 2022-01-01 PROCEDURE — 99443: CPT

## 2022-01-01 PROCEDURE — 99024 POSTOP FOLLOW-UP VISIT: CPT

## 2022-01-01 PROCEDURE — 71045 X-RAY EXAM CHEST 1 VIEW: CPT | Mod: 26

## 2022-01-01 PROCEDURE — 99223 1ST HOSP IP/OBS HIGH 75: CPT

## 2022-01-01 PROCEDURE — 68761 CLOSE TEAR DUCT OPENING: CPT | Mod: E2,E4

## 2022-01-01 PROCEDURE — 99284 EMERGENCY DEPT VISIT MOD MDM: CPT

## 2022-01-01 PROCEDURE — 93010 ELECTROCARDIOGRAM REPORT: CPT

## 2022-01-01 PROCEDURE — 92012 INTRM OPH EXAM EST PATIENT: CPT

## 2022-01-01 PROCEDURE — 17000 DESTRUCT PREMALG LESION: CPT | Mod: GC

## 2022-01-01 PROCEDURE — 92025 CPTRIZED CORNEAL TOPOGRAPHY: CPT

## 2022-01-01 PROCEDURE — 93000 ELECTROCARDIOGRAM COMPLETE: CPT

## 2022-01-01 PROCEDURE — 99205 OFFICE O/P NEW HI 60 MIN: CPT | Mod: 95

## 2022-01-01 PROCEDURE — 99497 ADVNCD CARE PLAN 30 MIN: CPT | Mod: 95,25

## 2022-01-01 PROCEDURE — 99497 ADVNCD CARE PLAN 30 MIN: CPT | Mod: 25

## 2022-01-01 PROCEDURE — 99212 OFFICE O/P EST SF 10 MIN: CPT | Mod: 25,GC

## 2022-01-01 PROCEDURE — 68761 CLOSE TEAR DUCT OPENING: CPT | Mod: E4,E2

## 2022-01-01 PROCEDURE — 92012 INTRM OPH EXAM EST PATIENT: CPT | Mod: 25

## 2022-01-01 PROCEDURE — 66984 XCAPSL CTRC RMVL W/O ECP: CPT | Mod: LT

## 2022-01-01 PROCEDURE — 99483 ASSMT & CARE PLN PT COG IMP: CPT

## 2022-01-01 PROCEDURE — 90792 PSYCH DIAG EVAL W/MED SRVCS: CPT

## 2022-01-01 PROCEDURE — 17003 DESTRUCT PREMALG LES 2-14: CPT | Mod: GC

## 2022-01-01 RX ORDER — TRAZODONE HYDROCHLORIDE 50 MG/1
50 TABLET ORAL
Qty: 15 | Refills: 0 | Status: DISCONTINUED | COMMUNITY
Start: 2022-01-01 | End: 2022-01-01

## 2022-01-01 RX ORDER — LISINOPRIL 2.5 MG/1
40 TABLET ORAL DAILY
Refills: 0 | Status: DISCONTINUED | OUTPATIENT
Start: 2022-01-01 | End: 2022-01-01

## 2022-01-01 RX ORDER — AMLODIPINE BESYLATE 2.5 MG/1
2.5 TABLET ORAL DAILY
Refills: 0 | Status: DISCONTINUED | OUTPATIENT
Start: 2022-01-01 | End: 2022-01-01

## 2022-01-01 RX ORDER — HALOPERIDOL DECANOATE 100 MG/ML
2.5 INJECTION INTRAMUSCULAR ONCE
Refills: 0 | Status: COMPLETED | OUTPATIENT
Start: 2022-01-01 | End: 2022-01-01

## 2022-01-01 RX ORDER — LEVOTHYROXINE SODIUM 125 MCG
1 TABLET ORAL
Qty: 0 | Refills: 0 | DISCHARGE

## 2022-01-01 RX ORDER — LEVOTHYROXINE SODIUM 125 MCG
1 TABLET ORAL
Qty: 30 | Refills: 0
Start: 2022-01-01

## 2022-01-01 RX ORDER — OLANZAPINE 15 MG/1
1.25 TABLET, FILM COATED ORAL EVERY 8 HOURS
Refills: 0 | Status: DISCONTINUED | OUTPATIENT
Start: 2022-01-01 | End: 2022-01-01

## 2022-01-01 RX ORDER — SODIUM CHLORIDE 50 MG/ML
5 SOLUTION/ DROPS OPHTHALMIC
Qty: 45 | Refills: 0 | Status: ACTIVE | COMMUNITY
Start: 2022-01-01

## 2022-01-01 RX ORDER — SENNA PLUS 8.6 MG/1
2 TABLET ORAL AT BEDTIME
Refills: 0 | Status: DISCONTINUED | OUTPATIENT
Start: 2022-01-01 | End: 2022-01-01

## 2022-01-01 RX ORDER — ALENDRONATE SODIUM 70 MG/1
1 TABLET ORAL
Qty: 0 | Refills: 0 | DISCHARGE

## 2022-01-01 RX ORDER — POLYETHYLENE GLYCOL 3350 17 G/17G
17 POWDER, FOR SOLUTION ORAL
Qty: 0 | Refills: 0 | DISCHARGE
Start: 2022-01-01

## 2022-01-01 RX ORDER — AMIODARONE HYDROCHLORIDE 400 MG/1
1 TABLET ORAL
Qty: 30 | Refills: 0
Start: 2022-01-01 | End: 2022-11-16

## 2022-01-01 RX ORDER — DIVALPROEX SODIUM 500 MG/1
125 TABLET, DELAYED RELEASE ORAL AT BEDTIME
Refills: 0 | Status: DISCONTINUED | OUTPATIENT
Start: 2022-01-01 | End: 2022-01-01

## 2022-01-01 RX ORDER — ACETAMINOPHEN 500 MG
650 TABLET ORAL ONCE
Refills: 0 | Status: COMPLETED | OUTPATIENT
Start: 2022-01-01 | End: 2022-01-01

## 2022-01-01 RX ORDER — DIVALPROEX SODIUM 500 MG/1
1 TABLET, DELAYED RELEASE ORAL
Qty: 90 | Refills: 0
Start: 2022-01-01 | End: 2023-01-15

## 2022-01-01 RX ORDER — POLYETHYLENE GLYCOL 3350 17 G/17G
17 POWDER, FOR SOLUTION ORAL
Refills: 0 | Status: DISCONTINUED | OUTPATIENT
Start: 2022-01-01 | End: 2022-01-01

## 2022-01-01 RX ORDER — ATORVASTATIN CALCIUM 80 MG/1
1 TABLET, FILM COATED ORAL
Qty: 0 | Refills: 0 | DISCHARGE

## 2022-01-01 RX ORDER — ATORVASTATIN CALCIUM 80 MG/1
1 TABLET, FILM COATED ORAL
Qty: 30 | Refills: 0
Start: 2022-01-01 | End: 2022-11-16

## 2022-01-01 RX ORDER — LEVOTHYROXINE SODIUM 125 MCG
90 TABLET ORAL ONCE
Refills: 0 | Status: COMPLETED | OUTPATIENT
Start: 2022-01-01 | End: 2022-01-01

## 2022-01-01 RX ORDER — LIDOCAINE 4 G/100G
1 CREAM TOPICAL
Qty: 0 | Refills: 0 | DISCHARGE

## 2022-01-01 RX ORDER — AMLODIPINE BESYLATE 2.5 MG/1
1 TABLET ORAL
Qty: 30 | Refills: 0
Start: 2022-01-01 | End: 2022-11-16

## 2022-01-01 RX ORDER — AMLODIPINE BESYLATE 2.5 MG/1
1 TABLET ORAL
Qty: 0 | Refills: 0 | DISCHARGE

## 2022-01-01 RX ORDER — GLYCERIN ADULT
1 SUPPOSITORY, RECTAL RECTAL
Qty: 0 | Refills: 0 | DISCHARGE

## 2022-01-01 RX ORDER — PROPRANOLOL HCL 160 MG
1 CAPSULE, EXTENDED RELEASE 24HR ORAL
Qty: 60 | Refills: 0
Start: 2022-01-01 | End: 2022-11-16

## 2022-01-01 RX ORDER — ONDANSETRON 8 MG/1
4 TABLET, FILM COATED ORAL EVERY 8 HOURS
Refills: 0 | Status: DISCONTINUED | OUTPATIENT
Start: 2022-01-01 | End: 2022-01-01

## 2022-01-01 RX ORDER — SENNA PLUS 8.6 MG/1
2 TABLET ORAL
Qty: 0 | Refills: 0 | DISCHARGE
Start: 2022-01-01

## 2022-01-01 RX ORDER — AMLODIPINE BESYLATE 2.5 MG/1
1 TABLET ORAL
Qty: 30 | Refills: 0
Start: 2022-01-01 | End: 2022-11-17

## 2022-01-01 RX ORDER — ENOXAPARIN SODIUM 100 MG/ML
30 INJECTION SUBCUTANEOUS EVERY 24 HOURS
Refills: 0 | Status: DISCONTINUED | OUTPATIENT
Start: 2022-01-01 | End: 2022-01-01

## 2022-01-01 RX ORDER — DIVALPROEX SODIUM 500 MG/1
250 TABLET, DELAYED RELEASE ORAL AT BEDTIME
Refills: 0 | Status: DISCONTINUED | OUTPATIENT
Start: 2022-01-01 | End: 2022-01-01

## 2022-01-01 RX ORDER — LEVOTHYROXINE SODIUM 0.12 MG/1
125 TABLET ORAL
Qty: 90 | Refills: 0 | Status: ACTIVE | COMMUNITY
Start: 2022-01-01

## 2022-01-01 RX ORDER — ATORVASTATIN CALCIUM 80 MG/1
1 TABLET, FILM COATED ORAL
Qty: 30 | Refills: 0
Start: 2022-01-01 | End: 2022-11-17

## 2022-01-01 RX ORDER — MIRTAZAPINE 7.5 MG/1
7.5 TABLET, FILM COATED ORAL
Qty: 30 | Refills: 2 | Status: DISCONTINUED | COMMUNITY
Start: 2022-01-01 | End: 2022-01-01

## 2022-01-01 RX ORDER — ALENDRONATE SODIUM 70 MG/1
70 TABLET ORAL
Qty: 12 | Refills: 3 | Status: ACTIVE | COMMUNITY
Start: 2019-11-08 | End: 1900-01-01

## 2022-01-01 RX ORDER — OLANZAPINE 15 MG/1
1.25 TABLET, FILM COATED ORAL ONCE
Refills: 0 | Status: DISCONTINUED | OUTPATIENT
Start: 2022-01-01 | End: 2022-01-01

## 2022-01-01 RX ORDER — PROPRANOLOL HCL 160 MG
1 CAPSULE, EXTENDED RELEASE 24HR ORAL
Qty: 0 | Refills: 0 | DISCHARGE

## 2022-01-01 RX ORDER — VALPROIC ACID (AS SODIUM SALT) 250 MG/5ML
250 SOLUTION, ORAL ORAL AT BEDTIME
Refills: 0 | Status: DISCONTINUED | OUTPATIENT
Start: 2022-01-01 | End: 2022-01-01

## 2022-01-01 RX ORDER — TUBERCULIN PURIFIED PROTEIN DERIVATIVE 5 [IU]/.1ML
5 INJECTION, SOLUTION INTRADERMAL ONCE
Refills: 0 | Status: COMPLETED | OUTPATIENT
Start: 2022-01-01 | End: 2022-01-01

## 2022-01-01 RX ORDER — DIVALPROEX SODIUM 500 MG/1
125 TABLET, DELAYED RELEASE ORAL
Refills: 0 | Status: DISCONTINUED | OUTPATIENT
Start: 2022-01-01 | End: 2022-01-01

## 2022-01-01 RX ORDER — AMIODARONE HYDROCHLORIDE 400 MG/1
200 TABLET ORAL DAILY
Refills: 0 | Status: DISCONTINUED | OUTPATIENT
Start: 2022-01-01 | End: 2022-01-01

## 2022-01-01 RX ORDER — HALOPERIDOL DECANOATE 100 MG/ML
2.5 INJECTION INTRAMUSCULAR ONCE
Refills: 0 | Status: DISCONTINUED | OUTPATIENT
Start: 2022-01-01 | End: 2022-01-01

## 2022-01-01 RX ORDER — PSYLLIUM SEED (WITH DEXTROSE)
1 POWDER (GRAM) ORAL
Refills: 0 | Status: DISCONTINUED | OUTPATIENT
Start: 2022-01-01 | End: 2022-01-01

## 2022-01-01 RX ORDER — ATORVASTATIN CALCIUM 80 MG/1
20 TABLET, FILM COATED ORAL AT BEDTIME
Refills: 0 | Status: DISCONTINUED | OUTPATIENT
Start: 2022-01-01 | End: 2022-01-01

## 2022-01-01 RX ORDER — RISPERIDONE 0.25 MG/1
0.25 TABLET, FILM COATED ORAL
Qty: 30 | Refills: 0 | Status: ACTIVE | COMMUNITY
Start: 2022-01-01

## 2022-01-01 RX ORDER — RISPERIDONE 4 MG/1
1 TABLET ORAL
Qty: 0 | Refills: 0 | DISCHARGE

## 2022-01-01 RX ORDER — LEVOTHYROXINE SODIUM 125 MCG
125 TABLET ORAL DAILY
Refills: 0 | Status: DISCONTINUED | OUTPATIENT
Start: 2022-01-01 | End: 2022-01-01

## 2022-01-01 RX ORDER — RISPERIDONE 4 MG/1
0.25 TABLET ORAL ONCE
Refills: 0 | Status: COMPLETED | OUTPATIENT
Start: 2022-01-01 | End: 2022-01-01

## 2022-01-01 RX ORDER — PROPRANOLOL HCL 160 MG
1 CAPSULE, EXTENDED RELEASE 24HR ORAL
Qty: 60 | Refills: 0
Start: 2022-01-01 | End: 2022-11-17

## 2022-01-01 RX ORDER — ACETAMINOPHEN 500 MG
650 TABLET ORAL EVERY 6 HOURS
Refills: 0 | Status: DISCONTINUED | OUTPATIENT
Start: 2022-01-01 | End: 2022-01-01

## 2022-01-01 RX ORDER — LANOLIN ALCOHOL/MO/W.PET/CERES
3 CREAM (GRAM) TOPICAL AT BEDTIME
Refills: 0 | Status: DISCONTINUED | OUTPATIENT
Start: 2022-01-01 | End: 2022-01-01

## 2022-01-01 RX ORDER — LISINOPRIL 2.5 MG/1
1 TABLET ORAL
Qty: 30 | Refills: 0
Start: 2022-01-01 | End: 2022-11-16

## 2022-01-01 RX ORDER — AMIODARONE HYDROCHLORIDE 400 MG/1
1 TABLET ORAL
Qty: 30 | Refills: 0
Start: 2022-01-01 | End: 2022-11-17

## 2022-01-01 RX ORDER — L.ACIDOPH/B.ANIMALIS/B.LONGUM 15B CELL
1 CAPSULE ORAL
Qty: 0 | Refills: 0 | DISCHARGE

## 2022-01-01 RX ORDER — LISINOPRIL 2.5 MG/1
1 TABLET ORAL
Qty: 0 | Refills: 0 | DISCHARGE

## 2022-01-01 RX ORDER — AMIODARONE HYDROCHLORIDE 400 MG/1
1 TABLET ORAL
Qty: 0 | Refills: 0 | DISCHARGE

## 2022-01-01 RX ORDER — DIVALPROEX SODIUM 500 MG/1
250 TABLET, DELAYED RELEASE ORAL
Refills: 0 | Status: DISCONTINUED | OUTPATIENT
Start: 2022-01-01 | End: 2022-01-01

## 2022-01-01 RX ORDER — DIVALPROEX SODIUM 500 MG/1
1 TABLET, DELAYED RELEASE ORAL
Qty: 30 | Refills: 0
Start: 2022-01-01 | End: 2022-11-17

## 2022-01-01 RX ORDER — LISINOPRIL 2.5 MG/1
1 TABLET ORAL
Qty: 30 | Refills: 0
Start: 2022-01-01 | End: 2022-11-17

## 2022-01-01 RX ADMIN — DIVALPROEX SODIUM 250 MILLIGRAM(S): 500 TABLET, DELAYED RELEASE ORAL at 22:48

## 2022-01-01 RX ADMIN — Medication 2 DROP(S): at 06:20

## 2022-01-01 RX ADMIN — Medication 1 PACKET(S): at 05:25

## 2022-01-01 RX ADMIN — Medication 2 DROP(S): at 06:47

## 2022-01-01 RX ADMIN — Medication 2 DROP(S): at 11:16

## 2022-01-01 RX ADMIN — LISINOPRIL 40 MILLIGRAM(S): 2.5 TABLET ORAL at 05:58

## 2022-01-01 RX ADMIN — Medication 2 DROP(S): at 02:12

## 2022-01-01 RX ADMIN — Medication 1 PACKET(S): at 05:12

## 2022-01-01 RX ADMIN — SENNA PLUS 2 TABLET(S): 8.6 TABLET ORAL at 20:11

## 2022-01-01 RX ADMIN — Medication 1 DROP(S): at 13:55

## 2022-01-01 RX ADMIN — AMIODARONE HYDROCHLORIDE 200 MILLIGRAM(S): 400 TABLET ORAL at 05:41

## 2022-01-01 RX ADMIN — Medication 650 MILLIGRAM(S): at 13:45

## 2022-01-01 RX ADMIN — Medication 2 DROP(S): at 11:17

## 2022-01-01 RX ADMIN — LISINOPRIL 40 MILLIGRAM(S): 2.5 TABLET ORAL at 05:57

## 2022-01-01 RX ADMIN — Medication 2 DROP(S): at 16:54

## 2022-01-01 RX ADMIN — Medication 1 TABLET(S): at 17:11

## 2022-01-01 RX ADMIN — Medication 0.5 MILLIGRAM(S): at 03:06

## 2022-01-01 RX ADMIN — LISINOPRIL 40 MILLIGRAM(S): 2.5 TABLET ORAL at 05:40

## 2022-01-01 RX ADMIN — Medication 1 TABLET(S): at 12:25

## 2022-01-01 RX ADMIN — ENOXAPARIN SODIUM 30 MILLIGRAM(S): 100 INJECTION SUBCUTANEOUS at 05:43

## 2022-01-01 RX ADMIN — AMIODARONE HYDROCHLORIDE 200 MILLIGRAM(S): 400 TABLET ORAL at 10:27

## 2022-01-01 RX ADMIN — AMIODARONE HYDROCHLORIDE 200 MILLIGRAM(S): 400 TABLET ORAL at 05:56

## 2022-01-01 RX ADMIN — Medication 1 PACKET(S): at 05:47

## 2022-01-01 RX ADMIN — Medication 125 MICROGRAM(S): at 05:41

## 2022-01-01 RX ADMIN — POLYETHYLENE GLYCOL 3350 17 GRAM(S): 17 POWDER, FOR SOLUTION ORAL at 16:48

## 2022-01-01 RX ADMIN — AMLODIPINE BESYLATE 2.5 MILLIGRAM(S): 2.5 TABLET ORAL at 06:59

## 2022-01-01 RX ADMIN — ATORVASTATIN CALCIUM 20 MILLIGRAM(S): 80 TABLET, FILM COATED ORAL at 22:08

## 2022-01-01 RX ADMIN — Medication 125 MICROGRAM(S): at 05:39

## 2022-01-01 RX ADMIN — ENOXAPARIN SODIUM 30 MILLIGRAM(S): 100 INJECTION SUBCUTANEOUS at 05:37

## 2022-01-01 RX ADMIN — ENOXAPARIN SODIUM 30 MILLIGRAM(S): 100 INJECTION SUBCUTANEOUS at 05:40

## 2022-01-01 RX ADMIN — AMLODIPINE BESYLATE 2.5 MILLIGRAM(S): 2.5 TABLET ORAL at 05:48

## 2022-01-01 RX ADMIN — AMIODARONE HYDROCHLORIDE 200 MILLIGRAM(S): 400 TABLET ORAL at 05:12

## 2022-01-01 RX ADMIN — Medication 650 MILLIGRAM(S): at 12:17

## 2022-01-01 RX ADMIN — ATORVASTATIN CALCIUM 20 MILLIGRAM(S): 80 TABLET, FILM COATED ORAL at 21:43

## 2022-01-01 RX ADMIN — Medication 1 TABLET(S): at 11:58

## 2022-01-01 RX ADMIN — LISINOPRIL 40 MILLIGRAM(S): 2.5 TABLET ORAL at 05:22

## 2022-01-01 RX ADMIN — Medication 2 DROP(S): at 11:49

## 2022-01-01 RX ADMIN — Medication 1 TABLET(S): at 11:12

## 2022-01-01 RX ADMIN — POLYETHYLENE GLYCOL 3350 17 GRAM(S): 17 POWDER, FOR SOLUTION ORAL at 17:23

## 2022-01-01 RX ADMIN — Medication 1 TABLET(S): at 17:14

## 2022-01-01 RX ADMIN — SENNA PLUS 2 TABLET(S): 8.6 TABLET ORAL at 22:49

## 2022-01-01 RX ADMIN — Medication 125 MICROGRAM(S): at 06:11

## 2022-01-01 RX ADMIN — Medication 2 DROP(S): at 05:55

## 2022-01-01 RX ADMIN — Medication 1 PACKET(S): at 17:15

## 2022-01-01 RX ADMIN — Medication 1 DROP(S): at 21:48

## 2022-01-01 RX ADMIN — SENNA PLUS 2 TABLET(S): 8.6 TABLET ORAL at 21:44

## 2022-01-01 RX ADMIN — Medication 1 PACKET(S): at 05:19

## 2022-01-01 RX ADMIN — Medication 1 DROP(S): at 13:09

## 2022-01-01 RX ADMIN — Medication 1 DROP(S): at 21:49

## 2022-01-01 RX ADMIN — ENOXAPARIN SODIUM 30 MILLIGRAM(S): 100 INJECTION SUBCUTANEOUS at 05:34

## 2022-01-01 RX ADMIN — DIVALPROEX SODIUM 125 MILLIGRAM(S): 500 TABLET, DELAYED RELEASE ORAL at 22:23

## 2022-01-01 RX ADMIN — Medication 125 MICROGRAM(S): at 05:22

## 2022-01-01 RX ADMIN — AMIODARONE HYDROCHLORIDE 200 MILLIGRAM(S): 400 TABLET ORAL at 05:38

## 2022-01-01 RX ADMIN — ATORVASTATIN CALCIUM 20 MILLIGRAM(S): 80 TABLET, FILM COATED ORAL at 21:21

## 2022-01-01 RX ADMIN — AMLODIPINE BESYLATE 2.5 MILLIGRAM(S): 2.5 TABLET ORAL at 06:11

## 2022-01-01 RX ADMIN — Medication 2 DROP(S): at 17:01

## 2022-01-01 RX ADMIN — LISINOPRIL 40 MILLIGRAM(S): 2.5 TABLET ORAL at 06:48

## 2022-01-01 RX ADMIN — SENNA PLUS 2 TABLET(S): 8.6 TABLET ORAL at 21:41

## 2022-01-01 RX ADMIN — LISINOPRIL 40 MILLIGRAM(S): 2.5 TABLET ORAL at 06:19

## 2022-01-01 RX ADMIN — AMLODIPINE BESYLATE 2.5 MILLIGRAM(S): 2.5 TABLET ORAL at 06:23

## 2022-01-01 RX ADMIN — ENOXAPARIN SODIUM 30 MILLIGRAM(S): 100 INJECTION SUBCUTANEOUS at 05:23

## 2022-01-01 RX ADMIN — LISINOPRIL 40 MILLIGRAM(S): 2.5 TABLET ORAL at 06:59

## 2022-01-01 RX ADMIN — AMLODIPINE BESYLATE 2.5 MILLIGRAM(S): 2.5 TABLET ORAL at 05:12

## 2022-01-01 RX ADMIN — ATORVASTATIN CALCIUM 20 MILLIGRAM(S): 80 TABLET, FILM COATED ORAL at 21:36

## 2022-01-01 RX ADMIN — Medication 1 PACKET(S): at 17:25

## 2022-01-01 RX ADMIN — AMLODIPINE BESYLATE 2.5 MILLIGRAM(S): 2.5 TABLET ORAL at 05:42

## 2022-01-01 RX ADMIN — Medication 1 PACKET(S): at 05:57

## 2022-01-01 RX ADMIN — Medication 1 PACKET(S): at 06:12

## 2022-01-01 RX ADMIN — POLYETHYLENE GLYCOL 3350 17 GRAM(S): 17 POWDER, FOR SOLUTION ORAL at 17:18

## 2022-01-01 RX ADMIN — Medication 1 DROP(S): at 05:12

## 2022-01-01 RX ADMIN — POLYETHYLENE GLYCOL 3350 17 GRAM(S): 17 POWDER, FOR SOLUTION ORAL at 05:47

## 2022-01-01 RX ADMIN — POLYETHYLENE GLYCOL 3350 17 GRAM(S): 17 POWDER, FOR SOLUTION ORAL at 20:11

## 2022-01-01 RX ADMIN — SENNA PLUS 2 TABLET(S): 8.6 TABLET ORAL at 21:36

## 2022-01-01 RX ADMIN — Medication 2 DROP(S): at 10:55

## 2022-01-01 RX ADMIN — SENNA PLUS 2 TABLET(S): 8.6 TABLET ORAL at 22:08

## 2022-01-01 RX ADMIN — Medication 1 PACKET(S): at 08:21

## 2022-01-01 RX ADMIN — LISINOPRIL 40 MILLIGRAM(S): 2.5 TABLET ORAL at 06:23

## 2022-01-01 RX ADMIN — POLYETHYLENE GLYCOL 3350 17 GRAM(S): 17 POWDER, FOR SOLUTION ORAL at 16:53

## 2022-01-01 RX ADMIN — POLYETHYLENE GLYCOL 3350 17 GRAM(S): 17 POWDER, FOR SOLUTION ORAL at 05:42

## 2022-01-01 RX ADMIN — Medication 125 MICROGRAM(S): at 05:20

## 2022-01-01 RX ADMIN — DIVALPROEX SODIUM 125 MILLIGRAM(S): 500 TABLET, DELAYED RELEASE ORAL at 17:57

## 2022-01-01 RX ADMIN — Medication 90 MICROGRAM(S): at 03:35

## 2022-01-01 RX ADMIN — Medication 1 PACKET(S): at 17:06

## 2022-01-01 RX ADMIN — POLYETHYLENE GLYCOL 3350 17 GRAM(S): 17 POWDER, FOR SOLUTION ORAL at 05:23

## 2022-01-01 RX ADMIN — Medication 1 PACKET(S): at 17:08

## 2022-01-01 RX ADMIN — ENOXAPARIN SODIUM 30 MILLIGRAM(S): 100 INJECTION SUBCUTANEOUS at 05:18

## 2022-01-01 RX ADMIN — AMLODIPINE BESYLATE 2.5 MILLIGRAM(S): 2.5 TABLET ORAL at 05:20

## 2022-01-01 RX ADMIN — Medication 1 DROP(S): at 17:23

## 2022-01-01 RX ADMIN — HALOPERIDOL DECANOATE 2.5 MILLIGRAM(S): 100 INJECTION INTRAMUSCULAR at 20:10

## 2022-01-01 RX ADMIN — Medication 1 PACKET(S): at 17:19

## 2022-01-01 RX ADMIN — Medication 1 TABLET(S): at 11:18

## 2022-01-01 RX ADMIN — AMIODARONE HYDROCHLORIDE 200 MILLIGRAM(S): 400 TABLET ORAL at 05:40

## 2022-01-01 RX ADMIN — Medication 1 DROP(S): at 20:12

## 2022-01-01 RX ADMIN — POLYETHYLENE GLYCOL 3350 17 GRAM(S): 17 POWDER, FOR SOLUTION ORAL at 17:28

## 2022-01-01 RX ADMIN — ENOXAPARIN SODIUM 30 MILLIGRAM(S): 100 INJECTION SUBCUTANEOUS at 05:38

## 2022-01-01 RX ADMIN — ENOXAPARIN SODIUM 30 MILLIGRAM(S): 100 INJECTION SUBCUTANEOUS at 05:25

## 2022-01-01 RX ADMIN — AMIODARONE HYDROCHLORIDE 200 MILLIGRAM(S): 400 TABLET ORAL at 05:18

## 2022-01-01 RX ADMIN — Medication 2 DROP(S): at 05:47

## 2022-01-01 RX ADMIN — Medication 1 PACKET(S): at 17:30

## 2022-01-01 RX ADMIN — Medication 1 PACKET(S): at 18:03

## 2022-01-01 RX ADMIN — Medication 2 DROP(S): at 00:53

## 2022-01-01 RX ADMIN — Medication 1 TABLET(S): at 13:48

## 2022-01-01 RX ADMIN — ATORVASTATIN CALCIUM 20 MILLIGRAM(S): 80 TABLET, FILM COATED ORAL at 21:46

## 2022-01-01 RX ADMIN — SENNA PLUS 2 TABLET(S): 8.6 TABLET ORAL at 21:28

## 2022-01-01 RX ADMIN — POLYETHYLENE GLYCOL 3350 17 GRAM(S): 17 POWDER, FOR SOLUTION ORAL at 05:19

## 2022-01-01 RX ADMIN — AMIODARONE HYDROCHLORIDE 200 MILLIGRAM(S): 400 TABLET ORAL at 06:58

## 2022-01-01 RX ADMIN — AMLODIPINE BESYLATE 2.5 MILLIGRAM(S): 2.5 TABLET ORAL at 05:47

## 2022-01-01 RX ADMIN — Medication 1 PACKET(S): at 05:42

## 2022-01-01 RX ADMIN — TUBERCULIN PURIFIED PROTEIN DERIVATIVE 5 UNIT(S): 5 INJECTION, SOLUTION INTRADERMAL at 14:30

## 2022-01-01 RX ADMIN — Medication 1 DROP(S): at 05:37

## 2022-01-01 RX ADMIN — Medication 2 DROP(S): at 00:24

## 2022-01-01 RX ADMIN — DIVALPROEX SODIUM 125 MILLIGRAM(S): 500 TABLET, DELAYED RELEASE ORAL at 17:55

## 2022-01-01 RX ADMIN — AMLODIPINE BESYLATE 2.5 MILLIGRAM(S): 2.5 TABLET ORAL at 05:39

## 2022-01-01 RX ADMIN — POLYETHYLENE GLYCOL 3350 17 GRAM(S): 17 POWDER, FOR SOLUTION ORAL at 17:55

## 2022-01-01 RX ADMIN — AMLODIPINE BESYLATE 2.5 MILLIGRAM(S): 2.5 TABLET ORAL at 06:20

## 2022-01-01 RX ADMIN — Medication 2 DROP(S): at 17:08

## 2022-01-01 RX ADMIN — ENOXAPARIN SODIUM 30 MILLIGRAM(S): 100 INJECTION SUBCUTANEOUS at 05:20

## 2022-01-01 RX ADMIN — Medication 1 DROP(S): at 12:25

## 2022-01-01 RX ADMIN — SENNA PLUS 2 TABLET(S): 8.6 TABLET ORAL at 21:52

## 2022-01-01 RX ADMIN — AMLODIPINE BESYLATE 2.5 MILLIGRAM(S): 2.5 TABLET ORAL at 05:41

## 2022-01-01 RX ADMIN — ATORVASTATIN CALCIUM 20 MILLIGRAM(S): 80 TABLET, FILM COATED ORAL at 23:29

## 2022-01-01 RX ADMIN — Medication 2 DROP(S): at 11:23

## 2022-01-01 RX ADMIN — RISPERIDONE 0.25 MILLIGRAM(S): 4 TABLET ORAL at 19:24

## 2022-01-01 RX ADMIN — AMLODIPINE BESYLATE 2.5 MILLIGRAM(S): 2.5 TABLET ORAL at 05:38

## 2022-01-01 RX ADMIN — POLYETHYLENE GLYCOL 3350 17 GRAM(S): 17 POWDER, FOR SOLUTION ORAL at 05:37

## 2022-01-01 RX ADMIN — Medication 1 PACKET(S): at 05:37

## 2022-01-01 RX ADMIN — ENOXAPARIN SODIUM 30 MILLIGRAM(S): 100 INJECTION SUBCUTANEOUS at 06:18

## 2022-01-01 RX ADMIN — Medication 1 DROP(S): at 05:40

## 2022-01-01 RX ADMIN — Medication 1 TABLET(S): at 11:31

## 2022-01-01 RX ADMIN — Medication 2 DROP(S): at 17:15

## 2022-01-01 RX ADMIN — DIVALPROEX SODIUM 250 MILLIGRAM(S): 500 TABLET, DELAYED RELEASE ORAL at 22:09

## 2022-01-01 RX ADMIN — ATORVASTATIN CALCIUM 20 MILLIGRAM(S): 80 TABLET, FILM COATED ORAL at 21:49

## 2022-01-01 RX ADMIN — Medication 1 PACKET(S): at 17:22

## 2022-01-01 RX ADMIN — Medication 1 PACKET(S): at 17:54

## 2022-01-01 RX ADMIN — Medication 1 TABLET(S): at 11:53

## 2022-01-01 RX ADMIN — ATORVASTATIN CALCIUM 20 MILLIGRAM(S): 80 TABLET, FILM COATED ORAL at 22:24

## 2022-01-01 RX ADMIN — DIVALPROEX SODIUM 125 MILLIGRAM(S): 500 TABLET, DELAYED RELEASE ORAL at 17:22

## 2022-01-01 RX ADMIN — DIVALPROEX SODIUM 250 MILLIGRAM(S): 500 TABLET, DELAYED RELEASE ORAL at 21:44

## 2022-01-01 RX ADMIN — LISINOPRIL 40 MILLIGRAM(S): 2.5 TABLET ORAL at 05:23

## 2022-01-01 RX ADMIN — Medication 1 PACKET(S): at 17:46

## 2022-01-01 RX ADMIN — AMLODIPINE BESYLATE 2.5 MILLIGRAM(S): 2.5 TABLET ORAL at 05:56

## 2022-01-01 RX ADMIN — AMLODIPINE BESYLATE 2.5 MILLIGRAM(S): 2.5 TABLET ORAL at 05:22

## 2022-01-01 RX ADMIN — Medication 1 PACKET(S): at 20:12

## 2022-01-01 RX ADMIN — POLYETHYLENE GLYCOL 3350 17 GRAM(S): 17 POWDER, FOR SOLUTION ORAL at 05:21

## 2022-01-01 RX ADMIN — Medication 650 MILLIGRAM(S): at 22:29

## 2022-01-01 RX ADMIN — Medication 1 PACKET(S): at 05:21

## 2022-01-01 RX ADMIN — Medication 2 DROP(S): at 05:25

## 2022-01-01 RX ADMIN — AMIODARONE HYDROCHLORIDE 200 MILLIGRAM(S): 400 TABLET ORAL at 06:48

## 2022-01-01 RX ADMIN — Medication 1 PACKET(S): at 06:18

## 2022-01-01 RX ADMIN — ATORVASTATIN CALCIUM 20 MILLIGRAM(S): 80 TABLET, FILM COATED ORAL at 21:52

## 2022-01-01 RX ADMIN — Medication 1 DROP(S): at 21:27

## 2022-01-01 RX ADMIN — AMIODARONE HYDROCHLORIDE 200 MILLIGRAM(S): 400 TABLET ORAL at 06:11

## 2022-01-01 RX ADMIN — Medication 2 DROP(S): at 17:23

## 2022-01-01 RX ADMIN — LISINOPRIL 40 MILLIGRAM(S): 2.5 TABLET ORAL at 05:41

## 2022-01-01 RX ADMIN — LISINOPRIL 40 MILLIGRAM(S): 2.5 TABLET ORAL at 05:19

## 2022-01-01 RX ADMIN — ATORVASTATIN CALCIUM 20 MILLIGRAM(S): 80 TABLET, FILM COATED ORAL at 21:27

## 2022-01-01 RX ADMIN — AMIODARONE HYDROCHLORIDE 200 MILLIGRAM(S): 400 TABLET ORAL at 05:21

## 2022-01-01 RX ADMIN — ATORVASTATIN CALCIUM 20 MILLIGRAM(S): 80 TABLET, FILM COATED ORAL at 22:05

## 2022-01-01 RX ADMIN — Medication 1 PACKET(S): at 16:49

## 2022-01-01 RX ADMIN — ATORVASTATIN CALCIUM 20 MILLIGRAM(S): 80 TABLET, FILM COATED ORAL at 22:48

## 2022-01-01 RX ADMIN — Medication 650 MILLIGRAM(S): at 23:29

## 2022-01-01 RX ADMIN — Medication 2 DROP(S): at 22:09

## 2022-01-01 RX ADMIN — AMLODIPINE BESYLATE 2.5 MILLIGRAM(S): 2.5 TABLET ORAL at 05:40

## 2022-01-01 RX ADMIN — POLYETHYLENE GLYCOL 3350 17 GRAM(S): 17 POWDER, FOR SOLUTION ORAL at 06:12

## 2022-01-01 RX ADMIN — LISINOPRIL 40 MILLIGRAM(S): 2.5 TABLET ORAL at 05:48

## 2022-01-01 RX ADMIN — Medication 125 MICROGRAM(S): at 05:18

## 2022-01-01 RX ADMIN — AMLODIPINE BESYLATE 2.5 MILLIGRAM(S): 2.5 TABLET ORAL at 05:19

## 2022-01-01 RX ADMIN — SENNA PLUS 2 TABLET(S): 8.6 TABLET ORAL at 22:03

## 2022-01-01 RX ADMIN — Medication 1 TABLET(S): at 12:00

## 2022-01-01 RX ADMIN — Medication 1 TABLET(S): at 11:32

## 2022-01-01 RX ADMIN — Medication 125 MICROGRAM(S): at 05:56

## 2022-01-01 RX ADMIN — Medication 125 MICROGRAM(S): at 06:48

## 2022-01-01 RX ADMIN — Medication 2 DROP(S): at 18:03

## 2022-01-01 RX ADMIN — Medication 2 DROP(S): at 05:22

## 2022-01-01 RX ADMIN — Medication 1 DROP(S): at 13:48

## 2022-01-01 RX ADMIN — Medication 1 PACKET(S): at 17:14

## 2022-01-01 RX ADMIN — AMIODARONE HYDROCHLORIDE 200 MILLIGRAM(S): 400 TABLET ORAL at 05:22

## 2022-01-01 RX ADMIN — Medication 125 MICROGRAM(S): at 06:19

## 2022-01-01 RX ADMIN — Medication 0.5 MILLIGRAM(S): at 21:52

## 2022-01-01 RX ADMIN — Medication 1 PACKET(S): at 17:23

## 2022-01-01 RX ADMIN — Medication 2 DROP(S): at 06:11

## 2022-01-01 RX ADMIN — AMLODIPINE BESYLATE 2.5 MILLIGRAM(S): 2.5 TABLET ORAL at 06:48

## 2022-01-01 RX ADMIN — Medication 1 PACKET(S): at 05:39

## 2022-01-01 RX ADMIN — SENNA PLUS 2 TABLET(S): 8.6 TABLET ORAL at 22:05

## 2022-01-01 RX ADMIN — ENOXAPARIN SODIUM 30 MILLIGRAM(S): 100 INJECTION SUBCUTANEOUS at 05:21

## 2022-01-01 RX ADMIN — LISINOPRIL 40 MILLIGRAM(S): 2.5 TABLET ORAL at 06:11

## 2022-01-01 RX ADMIN — Medication 1 DROP(S): at 05:39

## 2022-01-01 RX ADMIN — SENNA PLUS 2 TABLET(S): 8.6 TABLET ORAL at 21:48

## 2022-01-01 RX ADMIN — ATORVASTATIN CALCIUM 20 MILLIGRAM(S): 80 TABLET, FILM COATED ORAL at 22:03

## 2022-01-01 RX ADMIN — Medication 1 PACKET(S): at 05:46

## 2022-01-01 RX ADMIN — ENOXAPARIN SODIUM 30 MILLIGRAM(S): 100 INJECTION SUBCUTANEOUS at 06:12

## 2022-01-01 RX ADMIN — AMIODARONE HYDROCHLORIDE 200 MILLIGRAM(S): 400 TABLET ORAL at 06:01

## 2022-01-01 RX ADMIN — Medication 650 MILLIGRAM(S): at 11:36

## 2022-01-01 RX ADMIN — Medication 0.5 MILLIGRAM(S): at 13:12

## 2022-01-01 RX ADMIN — AMLODIPINE BESYLATE 2.5 MILLIGRAM(S): 2.5 TABLET ORAL at 05:23

## 2022-01-01 RX ADMIN — DIVALPROEX SODIUM 250 MILLIGRAM(S): 500 TABLET, DELAYED RELEASE ORAL at 23:29

## 2022-01-01 RX ADMIN — Medication 0.5 MILLIGRAM(S): at 18:26

## 2022-01-01 RX ADMIN — Medication 125 MICROGRAM(S): at 05:42

## 2022-01-01 RX ADMIN — AMIODARONE HYDROCHLORIDE 200 MILLIGRAM(S): 400 TABLET ORAL at 05:48

## 2022-01-01 RX ADMIN — OLANZAPINE 1.25 MILLIGRAM(S): 15 TABLET, FILM COATED ORAL at 18:07

## 2022-01-01 RX ADMIN — Medication 2 DROP(S): at 05:38

## 2022-01-01 RX ADMIN — Medication 125 MICROGRAM(S): at 06:59

## 2022-01-01 RX ADMIN — Medication 2 DROP(S): at 17:31

## 2022-01-01 RX ADMIN — Medication 0.5 MILLIGRAM(S): at 18:22

## 2022-01-01 RX ADMIN — LISINOPRIL 40 MILLIGRAM(S): 2.5 TABLET ORAL at 05:12

## 2022-01-01 RX ADMIN — Medication 1 DROP(S): at 05:46

## 2022-01-01 RX ADMIN — LISINOPRIL 40 MILLIGRAM(S): 2.5 TABLET ORAL at 05:46

## 2022-01-01 RX ADMIN — SENNA PLUS 2 TABLET(S): 8.6 TABLET ORAL at 21:49

## 2022-01-01 RX ADMIN — Medication 1 DROP(S): at 05:20

## 2022-01-01 RX ADMIN — AMIODARONE HYDROCHLORIDE 200 MILLIGRAM(S): 400 TABLET ORAL at 05:46

## 2022-01-01 RX ADMIN — Medication 2 DROP(S): at 11:57

## 2022-01-01 RX ADMIN — Medication 2 DROP(S): at 05:41

## 2022-01-01 RX ADMIN — ATORVASTATIN CALCIUM 20 MILLIGRAM(S): 80 TABLET, FILM COATED ORAL at 21:48

## 2022-01-01 RX ADMIN — SENNA PLUS 2 TABLET(S): 8.6 TABLET ORAL at 21:20

## 2022-01-01 RX ADMIN — Medication 1 TABLET(S): at 11:17

## 2022-01-01 RX ADMIN — Medication 125 MICROGRAM(S): at 05:12

## 2022-01-01 RX ADMIN — Medication 650 MILLIGRAM(S): at 17:24

## 2022-01-01 RX ADMIN — Medication 2 DROP(S): at 12:00

## 2022-01-01 RX ADMIN — Medication 125 MICROGRAM(S): at 05:58

## 2022-01-01 RX ADMIN — Medication 1 PACKET(S): at 17:57

## 2022-01-01 RX ADMIN — SENNA PLUS 2 TABLET(S): 8.6 TABLET ORAL at 22:23

## 2022-01-01 RX ADMIN — ENOXAPARIN SODIUM 30 MILLIGRAM(S): 100 INJECTION SUBCUTANEOUS at 05:39

## 2022-01-01 RX ADMIN — Medication 0.5 MILLIGRAM(S): at 13:17

## 2022-01-01 RX ADMIN — Medication 1 PACKET(S): at 05:43

## 2022-01-01 RX ADMIN — Medication 125 MICROGRAM(S): at 16:44

## 2022-01-01 RX ADMIN — ATORVASTATIN CALCIUM 20 MILLIGRAM(S): 80 TABLET, FILM COATED ORAL at 22:16

## 2022-01-01 RX ADMIN — AMIODARONE HYDROCHLORIDE 200 MILLIGRAM(S): 400 TABLET ORAL at 06:19

## 2022-01-01 RX ADMIN — Medication 1 PACKET(S): at 05:20

## 2022-01-01 RX ADMIN — Medication 3 MILLIGRAM(S): at 21:36

## 2022-01-01 RX ADMIN — ENOXAPARIN SODIUM 30 MILLIGRAM(S): 100 INJECTION SUBCUTANEOUS at 06:47

## 2022-01-01 RX ADMIN — Medication 1 DROP(S): at 22:04

## 2022-01-01 RX ADMIN — Medication 0.5 MILLIGRAM(S): at 21:25

## 2022-01-01 RX ADMIN — Medication 2 DROP(S): at 17:06

## 2022-01-01 RX ADMIN — Medication 1 TABLET(S): at 18:03

## 2022-01-01 RX ADMIN — Medication 125 MICROGRAM(S): at 05:48

## 2022-01-01 RX ADMIN — DIVALPROEX SODIUM 125 MILLIGRAM(S): 500 TABLET, DELAYED RELEASE ORAL at 17:18

## 2022-01-01 RX ADMIN — Medication 1 DROP(S): at 14:29

## 2022-01-01 RX ADMIN — Medication 2 DROP(S): at 17:46

## 2022-01-01 RX ADMIN — Medication 3 MILLIGRAM(S): at 22:08

## 2022-01-01 RX ADMIN — Medication 1 DROP(S): at 21:44

## 2022-01-01 RX ADMIN — Medication 1 TABLET(S): at 11:06

## 2022-01-01 RX ADMIN — POLYETHYLENE GLYCOL 3350 17 GRAM(S): 17 POWDER, FOR SOLUTION ORAL at 17:57

## 2022-01-01 RX ADMIN — Medication 2 DROP(S): at 01:08

## 2022-01-01 RX ADMIN — ENOXAPARIN SODIUM 30 MILLIGRAM(S): 100 INJECTION SUBCUTANEOUS at 05:56

## 2022-01-01 RX ADMIN — LISINOPRIL 40 MILLIGRAM(S): 2.5 TABLET ORAL at 05:38

## 2022-01-01 RX ADMIN — ATORVASTATIN CALCIUM 20 MILLIGRAM(S): 80 TABLET, FILM COATED ORAL at 20:11

## 2022-01-01 RX ADMIN — LISINOPRIL 40 MILLIGRAM(S): 2.5 TABLET ORAL at 05:20

## 2022-01-01 RX ADMIN — Medication 650 MILLIGRAM(S): at 14:40

## 2022-01-01 RX ADMIN — Medication 1 TABLET(S): at 14:32

## 2022-01-01 RX ADMIN — AMIODARONE HYDROCHLORIDE 200 MILLIGRAM(S): 400 TABLET ORAL at 05:20

## 2022-01-01 RX ADMIN — ENOXAPARIN SODIUM 30 MILLIGRAM(S): 100 INJECTION SUBCUTANEOUS at 05:47

## 2022-01-01 RX ADMIN — Medication 2 DROP(S): at 11:52

## 2022-01-01 RX ADMIN — Medication 2 DROP(S): at 11:11

## 2022-01-01 RX ADMIN — Medication 2 DROP(S): at 05:58

## 2022-01-01 RX ADMIN — ENOXAPARIN SODIUM 30 MILLIGRAM(S): 100 INJECTION SUBCUTANEOUS at 06:00

## 2022-01-01 RX ADMIN — Medication 650 MILLIGRAM(S): at 11:17

## 2022-01-01 RX ADMIN — DIVALPROEX SODIUM 250 MILLIGRAM(S): 500 TABLET, DELAYED RELEASE ORAL at 22:26

## 2022-01-01 RX ADMIN — Medication 650 MILLIGRAM(S): at 18:30

## 2022-01-01 RX ADMIN — Medication 1 DROP(S): at 15:09

## 2022-01-01 RX ADMIN — POLYETHYLENE GLYCOL 3350 17 GRAM(S): 17 POWDER, FOR SOLUTION ORAL at 18:03

## 2022-01-01 RX ADMIN — Medication 1 TABLET(S): at 11:23

## 2022-01-01 RX ADMIN — Medication 2 DROP(S): at 05:21

## 2022-01-01 RX ADMIN — Medication 1 TABLET(S): at 17:25

## 2022-01-01 RX ADMIN — POLYETHYLENE GLYCOL 3350 17 GRAM(S): 17 POWDER, FOR SOLUTION ORAL at 05:40

## 2022-01-01 RX ADMIN — Medication 650 MILLIGRAM(S): at 11:52

## 2022-01-01 RX ADMIN — Medication 650 MILLIGRAM(S): at 12:15

## 2022-01-01 RX ADMIN — ATORVASTATIN CALCIUM 20 MILLIGRAM(S): 80 TABLET, FILM COATED ORAL at 21:40

## 2022-01-01 RX ADMIN — Medication 125 MICROGRAM(S): at 05:40

## 2022-01-01 RX ADMIN — Medication 2 DROP(S): at 16:53

## 2022-01-01 RX ADMIN — SENNA PLUS 2 TABLET(S): 8.6 TABLET ORAL at 23:29

## 2022-01-01 RX ADMIN — Medication 1 PACKET(S): at 17:02

## 2022-01-01 RX ADMIN — Medication 1 TABLET(S): at 20:11

## 2022-01-01 RX ADMIN — Medication 125 MICROGRAM(S): at 05:46

## 2022-01-01 RX ADMIN — SENNA PLUS 2 TABLET(S): 8.6 TABLET ORAL at 22:16

## 2022-01-01 RX ADMIN — Medication 125 MICROGRAM(S): at 05:47

## 2022-01-01 RX ADMIN — DIVALPROEX SODIUM 125 MILLIGRAM(S): 500 TABLET, DELAYED RELEASE ORAL at 22:04

## 2022-01-01 RX ADMIN — Medication 650 MILLIGRAM(S): at 10:52

## 2022-01-01 RX ADMIN — POLYETHYLENE GLYCOL 3350 17 GRAM(S): 17 POWDER, FOR SOLUTION ORAL at 05:39

## 2022-01-01 RX ADMIN — Medication 1 DROP(S): at 05:48

## 2022-01-01 RX ADMIN — Medication 1 TABLET(S): at 11:49

## 2022-01-01 RX ADMIN — DIVALPROEX SODIUM 250 MILLIGRAM(S): 500 TABLET, DELAYED RELEASE ORAL at 20:38

## 2022-01-01 RX ADMIN — POLYETHYLENE GLYCOL 3350 17 GRAM(S): 17 POWDER, FOR SOLUTION ORAL at 05:12

## 2022-01-01 RX ADMIN — HALOPERIDOL DECANOATE 2.5 MILLIGRAM(S): 100 INJECTION INTRAMUSCULAR at 21:30

## 2022-01-01 RX ADMIN — Medication 1 PACKET(S): at 05:40

## 2022-01-01 RX ADMIN — Medication 2 DROP(S): at 23:01

## 2022-01-01 RX ADMIN — DIVALPROEX SODIUM 125 MILLIGRAM(S): 500 TABLET, DELAYED RELEASE ORAL at 17:25

## 2022-01-01 RX ADMIN — DIVALPROEX SODIUM 250 MILLIGRAM(S): 500 TABLET, DELAYED RELEASE ORAL at 21:37

## 2022-01-01 RX ADMIN — LISINOPRIL 40 MILLIGRAM(S): 2.5 TABLET ORAL at 05:39

## 2022-01-01 RX ADMIN — AMLODIPINE BESYLATE 2.5 MILLIGRAM(S): 2.5 TABLET ORAL at 05:59

## 2022-01-01 RX ADMIN — AMIODARONE HYDROCHLORIDE 200 MILLIGRAM(S): 400 TABLET ORAL at 05:39

## 2022-01-01 RX ADMIN — Medication 2 DROP(S): at 11:06

## 2022-01-01 RX ADMIN — Medication 3 MILLIGRAM(S): at 22:48

## 2022-01-01 RX ADMIN — LISINOPRIL 40 MILLIGRAM(S): 2.5 TABLET ORAL at 05:42

## 2022-01-01 RX ADMIN — Medication 1 PACKET(S): at 16:53

## 2022-01-01 RX ADMIN — Medication 2 DROP(S): at 17:25

## 2022-01-01 RX ADMIN — POLYETHYLENE GLYCOL 3350 17 GRAM(S): 17 POWDER, FOR SOLUTION ORAL at 17:02

## 2022-01-01 RX ADMIN — AMLODIPINE BESYLATE 2.5 MILLIGRAM(S): 2.5 TABLET ORAL at 05:46

## 2022-01-01 RX ADMIN — Medication 1 DROP(S): at 21:35

## 2022-01-01 SDOH — SOCIAL STABILITY - SOCIAL INSECURITY: DEPENDENT RELATIVE NEEDING CARE AT HOME: Z63.6

## 2022-01-20 PROBLEM — K58.9 IBS (IRRITABLE BOWEL SYNDROME): Status: ACTIVE | Noted: 2020-10-29

## 2022-01-20 NOTE — PHYSICAL EXAM
[Normal] : alert, in no acute distress [Sclera] : the sclera and conjunctiva were normal [Normal Outer Ear/Nose] : the ears and nose were normal in appearance [Normal Appearance] : the appearance of the neck was normal [No Respiratory Distress] : no respiratory distress [No Acc Muscle Use] : no accessory muscle use [Respiration, Rhythm And Depth] : normal respiratory rhythm and effort [Auscultation Breath Sounds / Voice Sounds] : lungs were clear to auscultation bilaterally [Normal S1, S2] : normal S1 and S2 [Heart Rate And Rhythm] : heart rate was normal and rhythm regular [Edema] : edema was not present [Abdomen Tenderness] : non-tender [Abdomen Soft] : soft [No Clubbing, Cyanosis] : no clubbing or cyanosis of the fingernails [Involuntary Movements] : no involuntary movements were seen [Normal Color / Pigmentation] : normal skin color and pigmentation [Normal Affect] : the affect was normal [Normal Mood] : the mood was normal [de-identified] : wheelchair

## 2022-01-20 NOTE — HISTORY OF PRESENT ILLNESS
[FreeTextEntry1] : 89yoF seen with her dtr and caregiver Volodymyr for follow up\par \par \par episodes of delusions,worse in evening and in night\par episodic. they have been redirecting. no new medications.  they have not noticed association with Bentyl use.\par did not tolerate medications well in past with Seroquel\par example delusion: dog feces in room\par when she gets confused develops "panic attack" as she realizes that she can't remember her thought\par sleep disturbance: denies pain, denies nocturia, discussed sleep hygiene.  no effect with melatonin but open to trying again\par very concerned about side effects\par \par hx of CVA\par hemorrhagic\par symptoms were loss of peripheral vision, which resolved\par \par hx of anxiety: was taking benzodiazepines. remains off medications and is stable \par hx of depression: was on paxil and mirtazapine in 2016\par \par constipation: \par ibs- DR. Tsang \par psyllium tablets \par miralax gave her diarrhea\par only take bentyl rarely\par \par also with new bilateral hearing aides\par \par blood pressure well controlled today. reports adherent with medications \par \par OP:\par started fosamax 11/2019 and tolerating well,\par Dexa due 2021\par  \par hx of multiple fragility fractures:\par VCF s/p vertebral augmentation of T9 on 8/25/21\par pelvic fracture after fall 6/2019\par sept 2014 : pelvic fracture\par oct 2014 : coccyx fracture, L1 compression fracture - s/p kyphoplasty\par may 2015 : c2 fracture\par 2015 fell and was hospitalized at [facility], where all her w/u negative, also had CT angio neck done which ruled out any injury to blood vessels from c2 fracture and she was discharged home.\par oct 2015 right proximal humerus fracture\par July 2018: L3 VCF on MRI\par \par hx of L cataract surgery\par Macular degeneration\par \par Chronic gait instability:\par walks with walker\par denies recent falls- using wheelchair\par \par Afib / HTN / HLD\par - On Norvasc 5mg daily, Lisinopril 40mg daily, and Propranolol 40mg BID \par - On amiodarone\par - On Llipitor 20\par -not on AC due to falls and increase bleeding risk\par \par multiple skin cancers:follows with dermatology\par \par  used to teach piano\par

## 2022-01-20 NOTE — ASSESSMENT
[FreeTextEntry1] : Delusions:  episodic associated with anxiety.  c/w HHA and supportive care and redirection, no medications at this time.  \par -will check labwork work\par -next visit cognitive assessment\par \par OP: with multiple fragility fractures, cw fosamax (started 2019, hx of taking prior for <1 yr), dexa due 2021, will order\par \par ibs- fluctuates between diarrhea and constipation; has been taking bentyl for years, but rarely (2x monthly). discussed risks associated with medication and PIM for geriatrics. family is aware and will be cautious with use. continue with psyllium and colace\par \par htn: controlled c/w current medications, f/u labwork\par \par afib:controlled. remains on amio, follows with cardiology\par \par walker use for unsteady gait\par \par hypothyroidism: stable c/w levo, check tsh\par \par next visit memory testing

## 2022-02-11 PROBLEM — R26.89 IMPAIRED GAIT AND MOBILITY: Status: ACTIVE | Noted: 2022-01-01

## 2022-02-11 NOTE — HISTORY OF PRESENT ILLNESS
[Any fall with injury in past year] : Patient reported fall with injury in the past year [Moderate] : Stage: Moderate [Reviewed no changes] : Reviewed, no changes [Independent] : transferring/mobility [] : Assistance needed with traveling/transport [Memory Lapses Or Loss] : stable memory impairment [Patient Observed To Be Agitated] : stable agitation [Sleep Disturbances] : stable sleep disturbances [Fixed Beliefs Contradicted By Reality (Delusions)] : stable delusions [FreeTextEntry1] : minimal  assistance   [CornellScale] : 5/30 [AdvancecareDate] : 2/11/2022

## 2022-02-11 NOTE — ASSESSMENT
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

## 2022-02-11 NOTE — PHYSICAL EXAM
[Alert] : alert [Normal Appearance] : the appearance of the neck was normal [Supple] : the neck was supple [No Respiratory Distress] : no respiratory distress [No Acc Muscle Use] : no accessory muscle use [Respiration, Rhythm And Depth] : normal respiratory rhythm and effort [Auscultation Breath Sounds / Voice Sounds] : lungs were clear to auscultation bilaterally [Normal S1, S2] : normal S1 and S2 [Heart Rate And Rhythm] : heart rate was normal and rhythm regular [Carotids Normal] : carotid pulses were normal with no bruits [Edema] : edema was not present [Bowel Sounds] : normal bowel sounds [Abdomen Tenderness] : non-tender [Abdomen Soft] : soft [Sensation] : the sensory exam was normal to light touch and pinprick [5] : hip extension 5/5 [Oriented To Time, Place, And Person] : oriented to person, place, and time [Oriented to Person] : oriented to person [Oriented to Place] : oriented to place [___ / 5] : Visuospatial / Executive: [unfilled] / 5 [0 / 0] : Memory: 0 / 0 [___ / 3] : Attention (Serial 7 subtraction): [unfilled] / 3 [___ / 1] : Fluency: [unfilled] / 1 [___ / 2] : Abstraction: [unfilled] / 2 [___ / 5] : Delayed Recall: [unfilled] / 5 [___ / 6] : Orientation: [unfilled] / 6 [JVD] : there was jugular-venous distention [Oriented to Time] : disoriented to time [de-identified] : wears  bilateral hearing aids [de-identified] : emotional lability [MocaTotal] : 11 [FreeTextEntry1] : 2/11/2022

## 2022-02-18 PROBLEM — Z01.818 PRE-OPERATIVE CLEARANCE: Status: ACTIVE | Noted: 2019-02-12

## 2022-02-18 NOTE — PHYSICAL EXAM
[Sclera] : the sclera and conjunctiva were normal [EOMI] : extraocular movements were intact [Normal Outer Ear/Nose] : the ears and nose were normal in appearance [Edema] : edema was not present [Normal] : normal bowel sounds, non-tender [Cervical Lymph Nodes Enlarged Posterior Bilaterally] : posterior cervical [Supraclavicular Lymph Nodes Enlarged Bilaterally] : supraclavicular [Cervical Lymph Nodes Enlarged Anterior Bilaterally] : anterior cervical, supraclavicular [No Clubbing, Cyanosis] : no clubbing or cyanosis of the fingernails [Involuntary Movements] : no involuntary movements were seen [Normal Color / Pigmentation] : normal skin color and pigmentation [No Focal Deficits] : no focal deficits [Normal Affect] : the affect was normal

## 2022-02-18 NOTE — ASSESSMENT
[Procedure Low Risk] : the procedure risk is low [Patient Low Risk] : the patient is a low surgical risk [Optimized for Surgery] : the patient is optimized for surgery [Continue] : Continue medications as currently directed [FreeTextEntry1] : 89yo with Moderate to severe dementia, hypothyroidism, and impaired gait who is medically cleared to proceed with planned surgery.

## 2022-02-18 NOTE — HISTORY OF PRESENT ILLNESS
[Preoperative Visit] : for a medical evaluation prior to surgery [Scheduled Procedure ___] : a [unfilled] [Date of Surgery ___] : on [unfilled] [Good] : Good [Fever] : no fever [Chills] : no chills [Fatigue] : no fatigue [Chest Pain] : no chest pain [Cough] : no cough [Dyspnea] : no dyspnea [Dysuria] : no dysuria [Nausea] : no nausea [Vomiting] : no vomiting [Diarrhea] : no diarrhea [Abdominal Pain] : no abdominal pain [Easy Bruising] : no easy bruising [Lower Extremity Swelling] : no lower extremity swelling [Diabetes] : no diabetes [Anti-Platelet Agents] : no anti-platelet agents [Nicotine Dependence] : no nicotine dependence [Renal Disease] : no renal disease [Sleep Apnea] : no sleep apnea [Frequent use of NSAIDs] : no use of NSAIDs [Bleeding Disorder] : no bleeding disorder [Frequent Aspirin Use] : no frequent aspirin use [Prior Anesthesia] : Prior anesthesia [Prev Anesthesia Reaction] : no previous anesthesia reaction [FreeTextEntry1] : 89yo with Moderate to severe dementia, hypothyroidism, and impaired gait seen for preop clearance.

## 2022-04-11 PROBLEM — Z11.59 SCREENING FOR VIRAL DISEASE: Status: ACTIVE | Noted: 2020-06-25
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
## 2022-05-04 PROBLEM — R26.81 UNSTABLE GAIT: Status: ACTIVE | Noted: 2022-01-01

## 2022-05-04 PROBLEM — M53.3 SACRAL BACK PAIN: Status: ACTIVE | Noted: 2022-01-01

## 2022-05-04 NOTE — REVIEW OF SYSTEMS
[Chest Pain] : no chest pain [Palpitations] : no palpitations [Shortness Of Breath] : no shortness of breath [Cough] : no cough [Abdominal Pain] : no abdominal pain [Constipation] : no constipation [As Noted in HPI] : as noted in HPI

## 2022-05-04 NOTE — PHYSICAL EXAM
[Alert] : alert [No Acute Distress] : in no acute distress [Sclera] : the sclera and conjunctiva were normal [PERRL] : pupils were equal in size, round, and reactive to light [Normal Oral Mucosa] : normal oral mucosa [No Oral Pallor] : no oral pallor [No Oral Cyanosis] : no oral cyanosis [No Respiratory Distress] : no respiratory distress [No Acc Muscle Use] : no accessory muscle use [Respiration, Rhythm And Depth] : normal respiratory rhythm and effort [Edema] : edema was not present [Normal Affect] : the affect was normal [Normal Mood] : the mood was normal [de-identified] : smiling, responding appropriately, elderly female [de-identified] : unable to assess  [de-identified] : appears soft [de-identified] : able to ambulate w/o pain [de-identified] : ataxic gait, using rollator

## 2022-05-04 NOTE — ASSESSMENT
[FreeTextEntry1] : -direct pt. time 1:30-2pm\par -available for urgent calls/PRN\par \par \par ROBINA Howard-BC

## 2022-05-04 NOTE — HISTORY OF PRESENT ILLNESS
[Home] : at home, [unfilled] , at the time of the visit. [Medical Office: ([facility])___] : at the medical office located in  [FreeTextEntry3] : Horace,daughter [FreeTextEntry1] : Ms. PHYLLIS BASSEN is a 89yo F with being seen via telehealth for acute visit for fall.Accompanied by daughter Vani. Pt. has A 24/7 care.\par \par Reported fall occurred 5/2 during the night, frequent night wakening. "lost her balance." Unwitnessed fall. Reports no acute pain. Fell pretty hard on my back. Able to walk at baseline. Denies injury to head, daughter report pt. is at baseline.\par \par Pt. today reports neck pain, sacral pain acute on chronic. Able to ambulate with walker. Not in any severe pain. Reports pain in at 5/10 neck and lower back alleviated by Tylenol and 1 motrin. \par \par \par Denies HA/dizziness, SOB/CP, abdominal pain, dysuria, reports daily BMs regular. eating at baseline. \par Pt. reports minimal pain on ambulation. \par -does not want to go to the hospital \par \par \par #unsteady gait\par - reports always unsteady on her feet\par \par Hospice? 8/5/2021\par \par #ACP\par -MOST in chart\par \par \par Plan\par -xray sacral and cervical and spine r/ fx,coordianted with SPX\par -Tylenol ES TID for pain\par -PT for gait strengthening, declined\par -provided [facility] for worsening symptoms \par 1:30-2pm\par  [One fall no injury in past year] : Patient reported one fall in the past year without injury [Walker] : walker [Grab Bars] : grab bars [de-identified] : using rollator [Reviewed no changes] : Reviewed, no changes [AdvancecareDate] : 05/4/200 [FreeTextEntry4] : KESHA in chart

## 2022-06-23 PROBLEM — R79.89 LOW VITAMIN D LEVEL: Status: ACTIVE | Noted: 2022-01-01

## 2022-06-27 PROBLEM — Z13.29 SCREENING FOR THYROID DISORDER: Status: ACTIVE | Noted: 2022-01-01

## 2022-06-27 NOTE — PHYSICAL EXAM
[Alert] : alert [No Acute Distress] : in no acute distress [Sclera] : the sclera and conjunctiva were normal [PERRL] : pupils were equal in size, round, and reactive to light [Normal Oral Mucosa] : normal oral mucosa [No Oral Cyanosis] : no oral cyanosis [Normal Appearance] : the appearance of the neck was normal [No Respiratory Distress] : no respiratory distress [No Acc Muscle Use] : no accessory muscle use [Respiration, Rhythm And Depth] : normal respiratory rhythm and effort [Normal PMI] : the apical impulse was abnormal [Normal S1, S2] : normal S1 and S2 [Heart Rate And Rhythm] : heart rate was normal and rhythm regular [___ +] : bilateral [unfilled]+ pitting edema to the ankles [Bowel Sounds] : normal bowel sounds [Abdomen Tenderness] : non-tender [Abdomen Soft] : soft [Cervical Lymph Nodes Enlarged Posterior Bilaterally] : posterior cervical [Supraclavicular Lymph Nodes Enlarged Bilaterally] : supraclavicular [Cervical Lymph Nodes Enlarged Anterior Bilaterally] : anterior cervical, supraclavicular [Axillary Lymph Nodes Enlarged Bilaterally] : axillary [No CVA Tenderness] : no CVA  tenderness [No Spinal Tenderness] : no spinal tenderness [] : no rash [Normal Turgor] : normal skin turgor [Normal Affect] : the affect was normal [Normal Mood] : the mood was normal [de-identified] : elderly frail older adult, remained in wheelchair, quiet [de-identified] : needs 1-2 assist with ambulation, mainly sedentary  [de-identified] : pale, healed skin tears noted on b/l LE, dry skin [de-identified] : quiet, smiling [MocaTotal] : 9 [FreeTextEntry1] : 6/23/22

## 2022-06-27 NOTE — HISTORY OF PRESENT ILLNESS
[One fall no injury in past year] : Patient reported one fall in the past year without injury [Full assistance needed] : Assistance needed managing medications [] : Assistance needed managing finances. [FAST Score: ____] : Functional Assessment Scale (FAST) Score: [unfilled] [Walker] : walker [Wheelchair] : wheelchair [Smoke Detector] : smoke detector [Carbon Monoxide Detector] : carbon monoxide detector [Night Light] : night light [0] : 1) Little interest or pleasure doing things: Not at all (0) [1] : 2) Feeling down, depressed, or hopeless for several days (1) [PHQ-2 Negative - No further assessment needed] : PHQ-2 Negative - No further assessment needed [Moderate] : Stage: Moderate [Worse] : Status: Worse [Reviewed no changes] : Reviewed, no changes [I will adhere to the patient's wishes.] : I will adhere to the patient's wishes. [FreeTextEntry1] : Ms. PHYLLIS BASSEN is a 89 yo Fwith PMHx dementia at moderate stage being seen for a follow up visit for dementia. Pt. was accompanied by Wil,son and Vani, daughter who assisted in hx intake due to pt.'s dementia. \par \par \par \par #dementia \par -HHA 24/7 at home\par -reports behaviors, frequent night wakening, very 1 hour\par -daughter reports HHA and herself can't get rest due to frequent night wakening\par -process of changing/adding HHA s?\par -daughter lives next door \par -occasional hallucinations, sees cat in the house. Other time it happened with poodles.\par -reports she sleeps a lot\par -son reports cannot separate dream from reality \par -reports behaviors often happen during night time \par -due to interrupted sleep, she naps a lot during the day \par -pt. was a  \par -use to play bridge \par -pt. stated " I am pretty normal" \par -pt. gets fixated on the topic \par -[facility] today 9/30 6/23/22\par \par #insomnia\par -have tried melatonin 5mg then increased  to 10mg, no change with OTC on sleep\par -tried CBD oil and it did not work \par \par \par #constipation\par -chronic, but stable with fiber meds\par \par #caregiver burden \par -significant\par \par #ACP \par -MOLST in place\par -wants to be at home as long as possible \par \par Plan\par -labs;TSH,CBC,CMP, B12, vit d\par -mirtazipine 7.5mg trial, d/w Dr. Villaseñor and agreed\par -f/u in 1 month and plan for EKG at f/u \par \par Denies pain. Denies acute visits/falls. Denies HA/dizziness, SOB/CP, abdominal pain, dysuria, reports daily BMs regular. \par -right foot is slightly swollen, chronic \par -healed skin tears on lower legs [Guns at Home] : no guns at home [Anti-Slip Measures] : no anti-slip measures [Driving Concerns] : not driving or driving without noted concerns [FreeTextEntry8] : "most of the time she makes it, sometimes with stool" [de-identified] : 1 [FreeTextEntry7] : pill box  [de-identified] : 0 [de-identified] : sensory activated night lights [AdvancecareDate] : 6/23/22 [FreeTextEntry4] : #ACP \par -MOLST in place\par -wants to be at home as long as possible

## 2022-06-27 NOTE — REVIEW OF SYSTEMS
[As Noted in HPI] : as noted in HPI [Fever] : no fever [Chills] : no chills [Eye Pain] : no eye pain [Sore Throat] : no sore throat [Chest Pain] : no chest pain [Palpitations] : no palpitations [Constipation] : no constipation

## 2022-06-27 NOTE — ASSESSMENT
[FreeTextEntry1] : -Educated daughter when behaviors occur to acknowledge patient's emotions and redirect behavior with distractions, address physical needs. Provided examples.. Provided examples.Family agreed to try this non-pharmacological approach. \par \par \par -Safety: \par -pt. is accompanied 24/7 and reports safety concerns are not an issue at the moment. . Counseled that pt. is a high fall risk. Continue will fall safety precautions. \par \par -follow up in 1 month\par -available for urgent visits and as needed by phone. \par -I have spent 15 minutes  coordination of care for the patient regarding dementia \par -direct patient time 3:30-4:30pm\par \par  ROBINA Howard-BC

## 2022-06-28 PROBLEM — S81.801A LEG WOUND, RIGHT: Status: ACTIVE | Noted: 2022-01-01

## 2022-06-28 NOTE — PHYSICAL EXAM
[Alert] : alert [No Acute Distress] : in no acute distress [No Respiratory Distress] : no respiratory distress [No Acc Muscle Use] : no accessory muscle use [Respiration, Rhythm And Depth] : normal respiratory rhythm and effort [No Focal Deficits] : no focal deficits [Normal Affect] : the affect was normal [Normal Mood] : the mood was normal [Normal Gait] : abnormal gait [de-identified] : well-appearing older adult [de-identified] : right lower extremity scab (approximately 2x2cm ?) with pink periwound area; no purulent drainage, seems to be in healing stages

## 2022-06-28 NOTE — REASON FOR VISIT
[Acute] : an acute visit [Family Member] : family member [FreeTextEntry1] : wound on leg [FreeTextEntry3] : Vani, daughter

## 2022-06-28 NOTE — ASSESSMENT
[FreeTextEntry1] : follow up visit with Chioma Burden NP scheduled in 1 month\par \par Shanae Johnston, ROBINA-BC

## 2022-06-28 NOTE — HISTORY OF PRESENT ILLNESS
[Home] : at home, [unfilled] , at the time of the visit. [Medical Office: ([facility])___] : at the medical office located in  [Family Member] : family member [FreeTextEntry3] : Vani, daughter [FreeTextEntry1] : PHYLLIS BASSEN is a 89 yo woman with PMH of moderate stage dementia, hemorrhagic CVA, anxiety and depression, afib, HTN, osteoporosis, IBS, and hypothyroidism who presents today for acute visit for wound on right leg. Patient presents with daughter, Vani who provided additional history. \par \par Wound on right leg:\par -patient has friable skin and has scabs on legs\par -patient was last seen in person on 6/23 by Chioma Burden NP who multiple scabs on legs and gave family cavilon samples \par -daughter states that one scab on right leg looks a bit red around the edges\par -reportedly no warmth to touch, or pain associated with wound\par -no purulent drainage\par -no systemic signs of infection including fever, malaise, decreased PO intake, lethargy \par \par Insomnia:\par -patient was started on mirtazapine 7.5 mg on 6/23/22\par -took one dose but states that patient had bad reaction to it; slept for 10 hours but woke up very confused/lethargic \par -decided to stop medication\par -to help with night time awakenings, family is inquiring about [facility] agencies that they can private pay for \par \par  \par

## 2022-06-28 NOTE — REVIEW OF SYSTEMS
[Skin Wound] : skin wound [Feeling Poorly] : not feeling poorly [Feeling Tired] : not feeling tired [Nasal Discharge] : no nasal discharge [Sore Throat] : no sore throat [Chest Pain] : no chest pain [Palpitations] : no palpitations [Shortness Of Breath] : no shortness of breath [Wheezing] : no wheezing [Cough] : no cough [SOB on Exertion] : no shortness of breath during exertion [Abdominal Pain] : no abdominal pain No [Vomiting] : no vomiting [Constipation] : no constipation [Diarrhea] : no diarrhea [Limb Pain] : no limb pain

## 2022-08-09 NOTE — PHYSICAL EXAM
[Alert] : alert [No Acute Distress] : in no acute distress [No Respiratory Distress] : no respiratory distress [No Acc Muscle Use] : no accessory muscle use [Respiration, Rhythm And Depth] : normal respiratory rhythm and effort [Normal Gait] : abnormal gait [Normal Affect] : the affect was normal [Normal Mood] : the mood was normal [de-identified] : able to move both arms up and down with full ROM w/o pain; able to walk with walker w/o pain; no change in gait

## 2022-08-09 NOTE — CURRENT MEDS
[] : does not miss any medication doses [Yes] : Reviewed medication list for presence of high-risk medications. [Patient/caregiver able to verbalize understanding of medications, including indications and side effects] : Patient/caregiver able to verbalize understanding of medications, including indications and side effects

## 2022-08-09 NOTE — REASON FOR VISIT
[Home] : at home, [unfilled] , at the time of the visit. [Medical Office: ([facility])___] : at the medical office located in  [Patient] : the patient [This encounter was initiated by telehealth (audio with video) and converted to telephone (audio only) due to technical difficulties.] : This encounter was initiated by telehealth (audio with video) and converted to telephone (audio only) due to technical difficulties. [Acute] : an acute visit [Formal Caregiver] : formal caregiver [Family Member] : family member [FreeTextEntry1] : fall [FreeTextEntry3] : HHA and daughter, Vani

## 2022-08-09 NOTE — HISTORY OF PRESENT ILLNESS
[Name: ___] : Health Care Proxy's Name: [unfilled]  [FreeTextEntry1] : PHYLLIS BASSEN is a 89 yo woman with PMH of dementia, hemorrhagic CVA, anxiety, depression, afib, HTN, osteoporosis, IBS, and hypothyroidism who presents today for evaluation after a fall she had 2 days ago.\par \par Fall:\par -happened 2 days ago\par -witnessed by HHA\par -states that she was walking with walker, became unsteady and slid down on her right side\par -HHA blunted fall by supporting her as she was falling down\par -states that she did hit her right arm pretty hard on her walker though\par -did not hit her head, no LOC\par -no evidence of injury \par -states that she has been walking around with walker just fine, is not complaining of pain and is able to move her arms up and down without limitation \par -daughter would like to ensure that there is no fracture with imaging to be safe\par  [With Patient/Caregiver] : , with patient/caregiver [Reviewed no changes] : Reviewed, no changes [Last Verification Date: ___] : Last Verification Date: [unfilled] [I will adhere to the patient's wishes.] : I will adhere to the patient's wishes. [AdvancecareDate] : 08/22 [FreeTextEntry4] : HCP: Daughter Vani\par secondary HCP: Wil \par \par MOLST in chart: DNR/DNI/NFT

## 2022-08-09 NOTE — REVIEW OF SYSTEMS
[Feeling Poorly] : not feeling poorly [Feeling Tired] : not feeling tired [Nasal Discharge] : no nasal discharge [Sore Throat] : no sore throat [Chest Pain] : no chest pain [Shortness Of Breath] : no shortness of breath [Cough] : no cough [Abdominal Pain] : no abdominal pain [Limb Pain] : no limb pain [FreeTextEntry1] : limited ROS d/t hx of dementia

## 2022-08-17 PROBLEM — Z85.89 HISTORY OF SQUAMOUS CELL CARCINOMA: Status: ACTIVE | Noted: 2021-01-01

## 2022-09-02 PROBLEM — R26.0 ATAXIC GAIT: Status: ACTIVE | Noted: 2022-01-01

## 2022-09-12 PROBLEM — Z63.6 CAREGIVER BURDEN: Status: ACTIVE | Noted: 2022-01-01

## 2022-09-14 PROBLEM — F51.05 INSOMNIA DUE TO MENTAL DISORDER: Status: ACTIVE | Noted: 2022-01-01

## 2022-09-14 PROBLEM — F22 DELUSIONS: Status: ACTIVE | Noted: 2022-01-01

## 2022-09-14 NOTE — HISTORY OF PRESENT ILLNESS
[FreeTextEntry1] : 89 yo woman with PMH of advanced dementia, hemorrhagic CVA, anxiety, depression, afib, HTN, osteoporosis, IBS, and hypothyroidism presenting for assistance with management of agitation and insomnia in setting of dementia. \par \par Agitation: \par Patient experiences sundowning in evenings starting at ~4PM with yelling, cursing, physical agitation.\par Patient has tried Seroquel, Risperidone, Trazodone.\par Most medications had reverse effect causing increased agitation.\par Risperidone caused excessive drowsiness and "ball of sadness" despite decreasing to less than half of the lowest dose.\par \par Sleep: \par Patient goes to sleep consistently at 8:30PM but then wakes up between 12-2AM, sometimes able to get an extra hour or two but then up for the day, and with increased agitation. Agitation improves by morning. Medications as above have not improved sleep.\par \par Daughter went to dispensary for trial of CBD/THC. Pharmacist started patient on 20:1 CBD/THC liquid drops during day. Improved symptoms, but daughter and HHA have been administering usually noon time and then again sometimes at 4pm if patient showing symptoms of agitation. Also started patient on 1:1 of CBD/THC administering at nighttime. Tincture works but only short term. Tried pills of 1:1 but caused excessive drowsiness during the day.

## 2022-09-14 NOTE — REASON FOR VISIT
[Initial Evaluation] : an initial evaluation [Home] : at home, [unfilled] , at the time of the visit. [Medical Office: ([facility])___] : at the medical office located in  [Family Member] : family member [Patient] : the patient

## 2022-09-14 NOTE — ASSESSMENT
[FreeTextEntry1] : RTC telehealth appointment next week\par \par Lea Sanchez MD \par Discussed with Dr Brown

## 2022-09-14 NOTE — END OF VISIT
[Time Spent: ___ minutes] : I have spent [unfilled] minutes of time on the encounter. [] : Fellow [FreeTextEntry3] : agree w/ assessment and plan\par medical marijuana recommendation given for scheduled dosing\par patient is sensitive to meds\par will f/u in 2 weeks to assess 6:1 thc dose

## 2022-09-14 NOTE — PHYSICAL EXAM
[General Appearance - In No Acute Distress] : in no acute distress [Sclera] : the sclera and conjunctiva were normal [Outer Ear] : the ears and nose were normal in appearance [Neck Appearance] : the appearance of the neck was normal [] : no respiratory distress [Respiration, Rhythm And Depth] : normal respiratory rhythm and effort [Exaggerated Use Of Accessory Muscles For Inspiration] : no accessory muscle use [Nail Clubbing] : no clubbing  or cyanosis of the fingernails [Involuntary Movements] : no involuntary movements were seen [Skin Color & Pigmentation] : normal skin color and pigmentation [No Focal Deficits] : no focal deficits [Affect] : the affect was normal [Mood] : the mood was normal

## 2022-09-20 PROBLEM — F03.91 DEMENTIA WITH BEHAVIORAL DISTURBANCE: Status: ACTIVE | Noted: 2022-01-01

## 2022-09-20 PROBLEM — Z51.5 ENCOUNTER FOR PALLIATIVE CARE: Status: ACTIVE | Noted: 2022-01-01

## 2022-09-20 PROBLEM — G47.01 INSOMNIA DUE TO MEDICAL CONDITION: Status: ACTIVE | Noted: 2022-01-01

## 2022-09-20 NOTE — REASON FOR VISIT
[Follow-Up] : a follow-up visit [Home] : at home, [unfilled] , at the time of the visit. [Medical Office: ([facility])___] : at the medical office located in  [Family Member] : family member [Patient] : the patient [FreeTextEntry4] : Daughter

## 2022-09-20 NOTE — REASON FOR VISIT
[Follow-Up] : a follow-up visit [Home] : at home, [unfilled] , at the time of the visit. [Medical Office: ([facility])___] : at the medical office located in  [Family Member] : family member [Patient] : the patient [FreeTextEntry4] : Daughter

## 2022-09-28 PROBLEM — F03.90 DEMENTIA: Status: ACTIVE | Noted: 2022-01-01

## 2022-09-28 PROBLEM — R45.1 AGITATION: Status: ACTIVE | Noted: 2021-08-06

## 2022-09-28 PROBLEM — R76.12 REACTION TO QUANTIFERON-TB TEST: Status: ACTIVE | Noted: 2022-01-01

## 2022-09-28 PROBLEM — R30.0 DYSURIA: Status: ACTIVE | Noted: 2022-01-01

## 2022-09-28 NOTE — ED PROVIDER NOTE - NS ED ATTENDING STATEMENT MOD
This was a shared visit with the LOUIS. I reviewed and verified the documentation and independently performed the documented:

## 2022-09-28 NOTE — ED ADULT NURSE NOTE - NSIMPLEMENTINTERV_GEN_ALL_ED
Implemented All Fall with Harm Risk Interventions:  [place] to call system. Call bell, personal items and telephone within reach. Instruct patient to call for assistance. Room bathroom lighting operational. Non-slip footwear when patient is off stretcher. Physically safe environment: no spills, clutter or unnecessary equipment. Stretcher in lowest position, wheels locked, appropriate side rails in place. Provide visual cue, wrist band, yellow gown, etc. Monitor gait and stability. Monitor for mental status changes and reorient to person, place, and time. Review medications for side effects contributing to fall risk. Reinforce activity limits and safety measures with patient and family. Provide visual clues: red socks.

## 2022-09-28 NOTE — ED PROVIDER NOTE - ATTENDING APP SHARED VISIT CONTRIBUTION OF CARE
I have evaluated the patient and agree with the documentation and assessment as made by the LOUIS. We have discussed plan of care and work up for the patient.   This was a shared visit with the LOUIS. I reviewed and verified the documentation and independently performed the documented:   History, Exam and Medical Decision Making.    89F, HTN, GERD, dementia who is brought in by family for worsening dementia. Over the past few months, PMD has tried a variety of meds to treat patient's dementia (seroquel, trazodone, risperidone). Also takes THC with mild relief. Episodes of agitation are episodic and usually resolve after meds, however for the past two days, agitation has been persistent despite risperidone. Was sent in by PMD to evaluate for medical causes of agitation. No recent falls/trauma. Physical: afebrile, elderly, rrr, ctabl, abdomen soft, trace peripheral edema, oriented to self. Plan: labs, urine, cxr, RVP. Dispo pending.

## 2022-09-28 NOTE — H&P ADULT - ASSESSMENT
89 year old female with PMH of TIA, HTN, hypothyroidism, GERD, multiple falls with vertebral fractures, moderate to severe dementia presents with worsening dementia and increased agitation 89 year old female with PMH of TIA, afib not on a/c, HTN, hypothyroidism, GERD, multiple falls with vertebral fractures, moderate to severe dementia presents with worsening agitation likely 2/2 progressive dementia, r/o organic causes

## 2022-09-28 NOTE — H&P ADULT - NSHPLABSRESULTS_GEN_ALL_CORE
( @ 19:00)                      12.3  6.48 )-----------( 230                 37.6    Neutrophils = 4.07 (62.9%)  Lymphocytes = 1.50 (23.1%)  Eosinophils = 0.10 (1.5%)  Basophils = 0.04 (0.6%)  Monocytes = 0.72 (11.1%)  Bands = --%        142  |  106  |  24<H>  ----------------------------<  108<H>  4.3   |  26  |  1.21    Ca    9.1      28 Sep 2022 19:00    TPro  7.4  /  Alb  4.1  /  TBili  0.2  /  DBili  x   /  AST  27  /  ALT  14  /  AlkPhos  71            RVP:( @ 18:30)  NotDetec        Urinalysis Basic - ( 28 Sep 2022 19:30 )    Color: Yellow / Appearance: Clear / S.020 / pH: x  Gluc: x / Ketone: Negative  / Bili: Negative / Urobili: <2 mg/dL   Blood: x / Protein: Trace / Nitrite: Negative   Leuk Esterase: Negative / RBC: x / WBC x   Sq Epi: x / Non Sq Epi: x / Bacteria: x    CXR: clear lungs  Labs and imaging reviewed  EKG: NSR, QTc 527 ( @ 19:00)                      12.3  6.48 )-----------( 230                 37.6    Neutrophils = 4.07 (62.9%)  Lymphocytes = 1.50 (23.1%)  Eosinophils = 0.10 (1.5%)  Basophils = 0.04 (0.6%)  Monocytes = 0.72 (11.1%)  Bands = --%        142  |  106  |  24<H>  ----------------------------<  108<H>  4.3   |  26  |  1.21    Ca    9.1      28 Sep 2022 19:00    TPro  7.4  /  Alb  4.1  /  TBili  0.2  /  DBili  x   /  AST  27  /  ALT  14  /  AlkPhos  71            RVP:( @ 18:30)  NotDetec        Urinalysis Basic - ( 28 Sep 2022 19:30 )    Color: Yellow / Appearance: Clear / S.020 / pH: x  Gluc: x / Ketone: Negative  / Bili: Negative / Urobili: <2 mg/dL   Blood: x / Protein: Trace / Nitrite: Negative   Leuk Esterase: Negative / RBC: x / WBC x   Sq Epi: x / Non Sq Epi: x / Bacteria: x    CXR: clear lungs  Labs and imaging reviewed  EKG: NSR, QTc 527. T wave inversion avl unchanged when compared to baseline EKG (qtc at that time 508)

## 2022-09-28 NOTE — ASSESSMENT
[FreeTextEntry1] : agitation :\par acute new over this past weekend\par also associated with some changes in her urination\par plan to check home labs and UA to rule out metabolic or infectious etiology\par currently calm-  but given severity of agitation, with causing danger to self, recommend using Risperdal PRN for severe agitation.  counselled on sedating side effect.  but will not want to use standing.  c/w other medications\par \par dementia:\par patient will need a Memory level unit care within [facility]\par will add on TB screening\par they will send paperwork once they get further along in search\par \par

## 2022-09-28 NOTE — ED PROVIDER NOTE - PROGRESS NOTE DETAILS
Evita Burk MD PGY1: I have been given all relevant clinical information from the previous provider and am assuming care of this patient at time of shift change.     Patient agitated, yelling out. Daughter reports patient received 0.25mg risperidone at home for agitation. Last dose 1530 today. 0.25mg risperidone ordered here. DaughterVani can be reached at [phone] (cell) or [phone] ([place])

## 2022-09-28 NOTE — H&P ADULT - PROBLEM SELECTOR PLAN 1
-Pt p/w worsening agitation, not well controlled with risperidone   -labs wnl, no evidence of infection. Pending CT head to r/o for structural causes   -suspect likely 2/2 progression of dementia   -given qt prolongation, will hold on haldol for now and treat with small doses of ativan prn  -hold additional risperidone for now  - evaluation in AM

## 2022-09-28 NOTE — H&P ADULT - PROBLEM SELECTOR PLAN 2
-qtc on outpt ekg 508, likely 2/2 risperdone use  -avoid additional anti-psychotic use for now and treat with ativan for agitation  -serial EKG to trend  -check mag

## 2022-09-28 NOTE — ED PROVIDER NOTE - OBJECTIVE STATEMENT
89 year old female with PMH of TIA, HTN Thyroid disease, GERD, multiple falls with vertebral fractures, moderate to severe dementia presents to the ED accompanied by daughter who states pt had a virtual visit with her PMD today for worsening dementia and increased agitation at home. Per daughter, they have tried different anti-psychotics from Seroquel to recently Risperidone, none of which has been helping with her worsening dementia. Pt has been more agitated, yelling and screaming at night, briefly responds to risperidone but quickly becomes agitated. Also complains of vague headache. Denies hx of recent fall or trauma. Sent in by PMD to exclude an organic cause. Denies any other complaints.

## 2022-09-28 NOTE — H&P ADULT - NSHPPHYSICALEXAM_GEN_ALL_CORE
GENERAL APPEARANCE: Well developed, well nourished, alert and uncooperative. NAD.   HEENT:  PERRL, EOMI. External auditory canals normal, hearing grossly intact.  NECK: Neck supple, non-tender without lymphadenopathy, masses or thyromegaly.  CARDIAC: Normal S1 and S2. No S3, S4 or murmurs. Rhythm is regular.  LUNGS: Clear to auscultation and percussion without rales, rhonchi, wheezing or diminished breath sounds.  ABDOMEN: Positive bowel sounds. Soft, nondistended, nontender. No guarding or rebound.   EXTREMITIES: No significant deformity or joint abnormality. No edema. Peripheral pulses intact. No varicosities.  NEUROLOGICAL: Not cooperative with neuro exam, moving all extremities spontaneously     SKIN: Skin normal color, texture and turgor with no rashes noted  PSYCHIATRIC: AOx0-1. Agitated, combative

## 2022-09-28 NOTE — H&P ADULT - HISTORY OF PRESENT ILLNESS
Pt a limited historian due to advanced dementia, history obtained from daughter at bedside    89 year old female with PMH of TIA, HTN, hypothyroidism, GERD, multiple falls with vertebral fractures, moderate to severe dementia presents to the ED accompanied by daughter who states pt had a virtual visit with her PMD today for worsening dementia and increased agitation at home. Per daughter, they have tried different anti-psychotics from Seroquel to recently Risperidone, none of which has been helping with her worsening dementia. Pt has been more agitated, yelling and screaming at night, briefly responds to risperidone but effects only last about 4 hours. Pt also complained of vague headache. Denies hx of recent fall or trauma. Sent in by PMD to exclude any organic cause.

## 2022-09-28 NOTE — PHYSICAL EXAM
[Alert] : alert [Sclera] : the sclera and conjunctiva were normal [No Respiratory Distress] : no respiratory distress [No Acc Muscle Use] : no accessory muscle use [Respiration, Rhythm And Depth] : normal respiratory rhythm and effort [de-identified] : calm, sitting in recliner, eating a cookie

## 2022-09-28 NOTE — HISTORY OF PRESENT ILLNESS
[FreeTextEntry1] : 89yoF with dementia with new severe agitation.\par \par nain dtr and son ashvin(on phone) providing history\par \par last 6 weeks have been better-  until last week\[place]\par sunday night at 3am with screaming very agitated, they were unable to calm her down\par continued throughout day with only short breaks with naps.\par \par saturday she had reduced urine output-  HHA today states she has been urinating\par didn't have BM today or yesterday- but she remains on stool softeners \par \par no sick contacts\par no cold like sytmpoms\par no diarrhea\par no new skin breakdown\par \par [place] [facility] [place]-\par he is touring the facility tomorrow\par \par  [Home] : at home, [unfilled] , at the time of the visit. [Medical Office: ([facility])___] : at the medical office located in  [Family Member] : family member [Formal Caregiver] : formal caregiver [FreeTextEntry3] : zoya Ludwig

## 2022-09-28 NOTE — ED ADULT TRIAGE NOTE - CHIEF COMPLAINT QUOTE
Pt BIBEMS from home h/o dementia for worsening mental status over the past 2 days, pt has been aggressive/yelling at home more frequently. Pt endorses a headache, no dizziness, accompanied with son at present moment.

## 2022-09-28 NOTE — ED PROVIDER NOTE - CLINICAL SUMMARY MEDICAL DECISION MAKING FREE TEXT BOX
89 year old female with PMH of TIA, HTN Thyroid disease, GERD, multiple falls with vertebral fractures, moderate to severe dementia presents to the ED accompanied by daughter who states pt had a virtual visit with her PMD today for worsening dementia and increased agitation at home. HD stable. Pt is currently calm and cooperative. Imp: Worsening dementia and agitation, r/o an organic cause. Plan for labs, UA, CT head, CXR, reassess.

## 2022-09-29 NOTE — PROGRESS NOTE ADULT - PROBLEM SELECTOR PLAN 1
- Pt p/w worsening agitation, not well controlled with risperidone  suspect likely 2/2 progression of dementia    - labs wnl, UA negative, RVP neg, VSS; no signs of active infection   - CTH negative for acute stroke or hemorrhage, notable for ischemic changes consistent with elderly age   - will consult psych for further optimization for meds - Pt p/w worsening agitation, not well controlled with risperidone suspect likely 2/2 progression of dementia    - labs wnl, UA negative, RVP neg, VSS; no signs of active infection   - CTH negative for acute stroke or hemorrhage, notable for ischemic changes consistent with elderly age   - will consult psych for further optimization for meds

## 2022-09-29 NOTE — PROGRESS NOTE ADULT - PROBLEM SELECTOR PLAN 2
-QTC on outpt ekg 508, likely 2/2 risperidone use  -avoid additional anti-psychotic use for now and treat with ativan for agitation - TSH elevated at 19.43 with normal free T4 10.41, spoke with son on administration of LTX and was told that patient was taking medication after eating breakfast which may be cause of decreased absorption, however given worsening agitation may be contributing to psychiatric disease   - per allscripts last TSH also elevated ar 14-->12, per family no changes to LTX medications recently   - will continue with home LTX at this time, will consult endocrine for further eval  - TPO ab pending

## 2022-09-29 NOTE — BH CONSULTATION LIAISON ASSESSMENT NOTE - NSBHCHARTREVIEWVS_PSY_A_CORE FT
Vital Signs Last 24 Hrs  T(C): 36.6 (29 Sep 2022 16:39), Max: 36.7 (29 Sep 2022 11:36)  T(F): 97.8 (29 Sep 2022 16:39), Max: 98.1 (29 Sep 2022 11:36)  HR: 66 (29 Sep 2022 16:39) (54 - 79)  BP: 138/60 (29 Sep 2022 16:39) (138/60 - 163/82)  BP(mean): 104 (29 Sep 2022 06:00) (104 - 107)  RR: 17 (29 Sep 2022 16:39) (15 - 18)  SpO2: 100% (29 Sep 2022 16:39) (100% - 100%)    Parameters below as of 29 Sep 2022 16:39  Patient On (Oxygen Delivery Method): room air  O2 Flow (L/min): 5

## 2022-09-29 NOTE — PATIENT PROFILE ADULT - FALL HARM RISK - HARM RISK INTERVENTIONS
Assistance with ambulation/Assistance OOB with selected safe patient handling equipment/Communicate Risk of Fall with Harm to all staff/Discuss with provider need for PT consult/Monitor for mental status changes/Monitor gait and stability/Reinforce activity limits and safety measures with patient and family/Reorient to person, place and time as needed/Review medications for side effects contributing to fall risk/Sit up slowly, dangle for a short time, stand at bedside before walking/Tailored Fall Risk Interventions/Toileting schedule using arm’s reach rule for commode and bathroom/Use of alarms - bed, chair and/or voice tab/Visual Cue: Yellow wristband and red socks/Bed in lowest position, wheels locked, appropriate side rails in place/Call bell, personal items and telephone in reach/Instruct patient to call for assistance before getting out of bed or chair/Non-slip footwear when patient is out of bed/[place] to call system/Physically safe environment - no spills, clutter or unnecessary equipment/Purposeful Proactive Rounding/Room/bathroom lighting operational, light cord in reach Assistance with ambulation/Assistance OOB with selected safe patient handling equipment/Communicate Risk of Fall with Harm to all staff/Monitor for mental status changes/Move patient closer to nurses' station/Reinforce activity limits and safety measures with patient and family/Reorient to person, place and time as needed/Review medications for side effects contributing to fall risk/Sit up slowly, dangle for a short time, stand at bedside before walking/Tailored Fall Risk Interventions/Toileting schedule using arm’s reach rule for commode and bathroom/Use of alarms - bed, chair and/or voice tab/Visual Cue: Yellow wristband and red socks/Bed in lowest position, wheels locked, appropriate side rails in place/Call bell, personal items and telephone in reach/Instruct patient to call for assistance before getting out of bed or chair/Non-slip footwear when patient is out of bed/[place] to call system/Physically safe environment - no spills, clutter or unnecessary equipment/Purposeful Proactive Rounding/Room/bathroom lighting operational, light cord in reach

## 2022-09-29 NOTE — PROGRESS NOTE ADULT - PROBLEM SELECTOR PLAN 5
- TSH elevated at 19.43 with normal free T4 10.41   - given patients age, higher TSH levels in older adults may be associated with health benefits than problems  - will continue with home LTX at this time - c/w amlodipine, propranolol, lisinopril

## 2022-09-29 NOTE — PROGRESS NOTE ADULT - PROBLEM SELECTOR PLAN 3
- c/w propanolol and amiodarone   - not on a/c likely due to recurrent falls - QTC on outpt ekg 508, likely 2/2 risperidone use  - repeat EKG in AM to evaluate   - avoid further QTc prolonging meds, has ativan PRN for agitation, please attempt to redirect patient prior to administration

## 2022-09-29 NOTE — BH CONSULTATION LIAISON ASSESSMENT NOTE - RISK ASSESSMENT
risk: agitation, confusion  Protective: no SI or HI, no hx of SA, support from family, domiciled, no substance use

## 2022-09-29 NOTE — CONSULT NOTE ADULT - PROBLEM SELECTOR RECOMMENDATION 9
Patient with underlying advanced dementia now with acute worsening of agitation.   Frequent reassurance and verbal orientation  Family members or other familiar persons by his bedside  Monitor for constipation, urinary retention, pain, hunger, thirst etc.  Promote sleep wake cycle and reorientation as indicated  Minimize use of benzodiazepines, opioids, anticholinergics, and other deliriogenic agents whenever possible. Patient requires support with ADLs. Please assist with ADLs PRN.  Skin care as per protocol.

## 2022-09-29 NOTE — BH CONSULTATION LIAISON ASSESSMENT NOTE - CURRENT MEDICATION
MEDICATIONS  (STANDING):  aMIOdarone    Tablet 200 milliGRAM(s) Oral daily  amLODIPine   Tablet 2.5 milliGRAM(s) Oral daily  atorvastatin 20 milliGRAM(s) Oral at bedtime  enoxaparin Injectable 30 milliGRAM(s) SubCutaneous every 24 hours  levothyroxine 125 MICROGram(s) Oral daily  lisinopril 40 milliGRAM(s) Oral daily  multivitamin 1 Tablet(s) Oral daily  propranolol 40 milliGRAM(s) Oral two times a day    MEDICATIONS  (PRN):  acetaminophen     Tablet .. 650 milliGRAM(s) Oral every 6 hours PRN Temp greater or equal to 38C (100.4F), Mild Pain (1 - 3)  aluminum hydroxide/magnesium hydroxide/simethicone Suspension 30 milliLiter(s) Oral every 4 hours PRN Dyspepsia  LORazepam   Injectable 0.5 milliGRAM(s) IV Push every 4 hours PRN Agitation  melatonin 3 milliGRAM(s) Oral at bedtime PRN Insomnia  ondansetron Injectable 4 milliGRAM(s) IV Push every 8 hours PRN Nausea and/or Vomiting

## 2022-09-29 NOTE — CONSULT NOTE ADULT - PROBLEM SELECTOR RECOMMENDATION 5
Thank you for allowing us to participate in your patient's care. Please page [phone] for any questions/concerns. Patient is DNR/DNI.   Spoke to son Wil at bedside and introduced role of inpatient dara/pal team. Wil shared that patient with advanced dementia and family has been requiring more assistance at home.   Attempted to reach out to daughter Vani but unable to reach at this time as she is working per son-in-law; will reattempt tomorrow.   Case reviewed with outpatient geriatric team and primary team.   Thank you for allowing us to participate in your patient's care. Please page [phone] for any questions/concerns.

## 2022-09-29 NOTE — BH CONSULTATION LIAISON ASSESSMENT NOTE - HPI (INCLUDE ILLNESS QUALITY, SEVERITY, DURATION, TIMING, CONTEXT, MODIFYING FACTORS, ASSOCIATED SIGNS AND SYMPTOMS)
Patient is a 89 year old female  with PMH of TIA, afib not on a/c, HTN, hypothyroidism, GERD, multiple falls with vertebral fractures, moderate to severe dementia presents with worsening agitation likely 2/2 progressive dementia, admitted to r/o organic causes. Patient comes from home, lives with 24/7 aid as well as daughter in same complex. Patient with no previous psychiatric hx prior to dementia. Has had medication trials given by her geriatrician. SPoke with daughter and son. Daughter reports patient has tried Seroquel, Remeron, and trazodone all which made patient agitation worse. Trailed risperidone which helped for a bit but was inconsistent and patient still had outbreaks for agitation and yelling. This past week patient was uncontrollable, yelling and agitated prompting there ED visit. Patient mostly gets agitated in the evening but timing can be unpredictable. No aggression towards others. NO harm to self.     patient was seen and assessed at bedside. patient is currently smiling, calm, cooperative. Aware of her name and her family, when given options states she is in the hospital. Patient is loose on exam, no rigidity. Accepting medications.

## 2022-09-29 NOTE — PROGRESS NOTE ADULT - SUBJECTIVE AND OBJECTIVE BOX
[facility] Division of Hospital Medicine  Elma Sethi M.D  Pager [phone]  Available via MS Teams    SUBJECTIVE / OVERNIGHT EVENTS: No acute events overnight. Sarahnet laying comfortably in bed, attempting to remove gown but able to be redirected. Denies any pain. AAO x1 to self, states she is in [place]. Denies any SOB, chest pain, nausea or vomiting     ADDITIONAL REVIEW OF SYSTEMS:    MEDICATIONS  (STANDING):  aMIOdarone    Tablet 200 milliGRAM(s) Oral daily  amLODIPine   Tablet 2.5 milliGRAM(s) Oral daily  atorvastatin 20 milliGRAM(s) Oral at bedtime  levothyroxine 125 MICROGram(s) Oral daily  lisinopril 40 milliGRAM(s) Oral daily  multivitamin 1 Tablet(s) Oral daily  propranolol 40 milliGRAM(s) Oral two times a day    MEDICATIONS  (PRN):  acetaminophen     Tablet .. 650 milliGRAM(s) Oral every 6 hours PRN Temp greater or equal to 38C (100.4F), Mild Pain (1 - 3)  aluminum hydroxide/magnesium hydroxide/simethicone Suspension 30 milliLiter(s) Oral every 4 hours PRN Dyspepsia  LORazepam   Injectable 0.5 milliGRAM(s) IV Push every 4 hours PRN Agitation  melatonin 3 milliGRAM(s) Oral at bedtime PRN Insomnia  ondansetron Injectable 4 milliGRAM(s) IV Push every 8 hours PRN Nausea and/or Vomiting      I&O's Summary      PHYSICAL EXAM:  Vital Signs Last 24 Hrs  T(C): 36.7 (29 Sep 2022 11:36), Max: 36.7 (28 Sep 2022 17:25)  T(F): 98.1 (29 Sep 2022 11:36), Max: 98.1 (29 Sep 2022 11:36)  HR: 79 (29 Sep 2022 11:36) (54 - 96)  BP: 163/82 (29 Sep 2022 11:36) (145/87 - 170/78)  BP(mean): 104 (29 Sep 2022 06:00) (104 - 107)  RR: 18 (29 Sep 2022 11:36) (15 - 18)  SpO2: 100% (29 Sep 2022 11:36) (100% - 100%)    Parameters below as of 29 Sep 2022 11:36  Patient On (Oxygen Delivery Method): room air      CONSTITUTIONAL: NAD, frail elderly female, laying in bed comfortably   EYES: PERRLA; conjunctiva and sclera clear  ENMT: Moist oral mucosa, no pharyngeal injection or exudates; normal dentition  NECK: Supple, no palpable masses; no thyromegaly  RESPIRATORY: Normal respiratory effort; lungs are clear to auscultation bilaterally  CARDIOVASCULAR: Regular rate and rhythm, normal S1 and S2, no murmur/rub/gallop; No lower extremity edema; Peripheral pulses are 2+ bilaterally  ABDOMEN: Nontender to palpation, normoactive bowel sounds, no rebound/guarding; No hepatosplenomegaly  MUSCULOSKELETAL:  Normal gait; no clubbing or cyanosis of digits; no joint swelling or tenderness to palpation  PSYCH: A+O to person, place, and time; affect appropriate  NEUROLOGY: CN 2-12 are intact and symmetric; no gross sensory deficits   SKIN: No rashes; no palpable lesions    LABS:                        11.1   5.59  )-----------( 191      ( 29 Sep 2022 05:00 )             33.1         145  |  108<H>  |  19  ----------------------------<  91  3.6   |  24  |  1.00    Ca    9.0      29 Sep 2022 05:00  Phos  3.5       Mg     2.10         TPro  7.4  /  Alb  4.1  /  TBili  0.2  /  DBili  x   /  AST  27  /  ALT  14  /  AlkPhos  71            Urinalysis Basic - ( 28 Sep 2022 19:30 )    Color: Yellow / Appearance: Clear / S.020 / pH: x  Gluc: x / Ketone: Negative  / Bili: Negative / Urobili: <2 mg/dL   Blood: x / Protein: Trace / Nitrite: Negative   Leuk Esterase: Negative / RBC: x / WBC x   Sq Epi: x / Non Sq Epi: x / Bacteria: x        SARS-CoV-2: NotDetec (28 Sep 2022 18:30)      RADIOLOGY & ADDITIONAL TESTS:  New Results Reviewed Today:   New Imaging Personally Reviewed Today:  New Electrocardiogram Personally Reviewed Today:  Prior or Outpatient Records Reviewed Today:    COMMUNICATION:  Care Discussed with Consultants/Other Providers and Details of Discussion:  Discussions with Patient/Family:  PCP Communication:     [facility]  Elma Sethi M.D  Pager [phone]  Available via MS Teams    SUBJECTIVE / OVERNIGHT EVENTS: No acute events overnight. Sarahnet laying comfortably in bed, attempting to remove gown but able to be redirected. Denies any pain. AAO x1 to self, states she is in [place]. Denies any SOB, chest pain, nausea or vomiting     ADDITIONAL REVIEW OF SYSTEMS:    MEDICATIONS  (STANDING):  aMIOdarone    Tablet 200 milliGRAM(s) Oral daily  amLODIPine   Tablet 2.5 milliGRAM(s) Oral daily  atorvastatin 20 milliGRAM(s) Oral at bedtime  levothyroxine 125 MICROGram(s) Oral daily  lisinopril 40 milliGRAM(s) Oral daily  multivitamin 1 Tablet(s) Oral daily  propranolol 40 milliGRAM(s) Oral two times a day    MEDICATIONS  (PRN):  acetaminophen     Tablet .. 650 milliGRAM(s) Oral every 6 hours PRN Temp greater or equal to 38C (100.4F), Mild Pain (1 - 3)  aluminum hydroxide/magnesium hydroxide/simethicone Suspension 30 milliLiter(s) Oral every 4 hours PRN Dyspepsia  LORazepam   Injectable 0.5 milliGRAM(s) IV Push every 4 hours PRN Agitation  melatonin 3 milliGRAM(s) Oral at bedtime PRN Insomnia  ondansetron Injectable 4 milliGRAM(s) IV Push every 8 hours PRN Nausea and/or Vomiting      I&O's Summary      PHYSICAL EXAM:  Vital Signs Last 24 Hrs  T(C): 36.7 (29 Sep 2022 11:36), Max: 36.7 (28 Sep 2022 17:25)  T(F): 98.1 (29 Sep 2022 11:36), Max: 98.1 (29 Sep 2022 11:36)  HR: 79 (29 Sep 2022 11:36) (54 - 96)  BP: 163/82 (29 Sep 2022 11:36) (145/87 - 170/78)  BP(mean): 104 (29 Sep 2022 06:00) (104 - 107)  RR: 18 (29 Sep 2022 11:36) (15 - 18)  SpO2: 100% (29 Sep 2022 11:36) (100% - 100%)    Parameters below as of 29 Sep 2022 11:36  Patient On (Oxygen Delivery Method): room air      CONSTITUTIONAL: NAD, frail elderly female, laying in bed comfortably   EYES: PERRLA; conjunctiva and sclera clear  ENMT: Moist oral mucosa, no pharyngeal injection or exudates; normal dentition  NECK: Supple, no palpable masses; no thyromegaly  RESPIRATORY: Normal respiratory effort; lungs are clear to auscultation bilaterally  CARDIOVASCULAR: Regular rate and rhythm, normal S1 and S2, no murmur/rub/gallop; No lower extremity edema; Peripheral pulses are 2+ bilaterally  ABDOMEN: Nontender to palpation, normoactive bowel sounds, no rebound/guarding; No hepatosplenomegaly  MUSCULOSKELETAL:  Normal gait; no clubbing or cyanosis of digits; no joint swelling or tenderness to palpation  PSYCH: A+O to person, place, and time; affect appropriate  NEUROLOGY: CN 2-12 are intact and symmetric; no gross sensory deficits   SKIN: No rashes; no palpable lesions    LABS:                        11.1   5.59  )-----------( 191      ( 29 Sep 2022 05:00 )             33.1         145  |  108<H>  |  19  ----------------------------<  91  3.6   |  24  |  1.00    Ca    9.0      29 Sep 2022 05:00  Phos  3.5       Mg     2.10         TPro  7.4  /  Alb  4.1  /  TBili  0.2  /  DBili  x   /  AST  27  /  ALT  14  /  AlkPhos  71            Urinalysis Basic - ( 28 Sep 2022 19:30 )    Color: Yellow / Appearance: Clear / S.020 / pH: x  Gluc: x / Ketone: Negative  / Bili: Negative / Urobili: <2 mg/dL   Blood: x / Protein: Trace / Nitrite: Negative   Leuk Esterase: Negative / RBC: x / WBC x   Sq Epi: x / Non Sq Epi: x / Bacteria: x        SARS-CoV-2: NotDetec (28 Sep 2022 18:30)      RADIOLOGY & ADDITIONAL TESTS:  New Imaging Personally Reviewed Today: CTH     < from: CT Head No Cont (22 @ 21:35) >  BRAIN:    The brain  demonstrates moderately extensive confluent lesions   of diminished attenuation within the cerebral hemispheric white matter   that are typical for ischemic white matter disease.  This may be most   pronounced in the posterior centrum semiovale, periatrial and posterior   periventricular white matter.  In the right occipital lobe parasagittal   aspect there is a small focus of cortical and subcortical   encephalomalacia.  The remaining cerebral cortical gray-white matter   differentiation is maintained.  No acute cerebral cortical infarct is   seen.  No intracranial hemorrhage is found.  No mass effect is found in   the brain.    CSF SPACES:  The ventricles, sulci and basalcisterns  appear moderately   dilated reflecting diffuse brain volume loss.   No differential cerebral   cortical atrophy is recognized.  However, there is differential   dilatation of the atrium and occipital horns the lateral ventricles.  The   temporal horns of the lateral ventricles are dilated to a lesser degree.    The third ventricle is not substantially dilated.  The cerebral aqueduct   is patent.  The fourth ventricles are not dilated.    VESSELS: Intracranial vasculature is also significant for atherosclerotic   calcifications within the cavernous segments of the internal carotid   arteries.    HEAD AND NECK STRUCTURES:  The orbits are unremarkable.  The included   paranasal sinuses  are clear.  The nasal septum has shallow deviation   right to left.  The nasopharynx is symmetric.  The central skull base is   intact.  The temporal bones demonstrate patent petrous air cells.  The   calvarium appears unremarkable.      IMPRESSION:    1.  Right occipital small remote cortical and subcortical infarction    2.  Ischemic white matter disease not atypical for age    3.  Diffuse brain volume loss overall upper range typical for age noting   differential posterior cerebral hemispheric white matter volume loss    4.  Intracranial atherosclerosis    5.  Patient motion limited scan      < end of copied text >

## 2022-09-29 NOTE — CONSULT NOTE ADULT - PROBLEM SELECTOR RECOMMENDATION 3
Patient with advanced dementia - FAST 6E  Supportive care - Dara Psych consult pending  - management as per dara psych team

## 2022-09-29 NOTE — BH CONSULTATION LIAISON ASSESSMENT NOTE - NSBHCHARTREVIEWLAB_PSY_A_CORE FT
11.1   5.59  )-----------( 191      ( 29 Sep 2022 05:00 )             33.1   09-29    145  |  108<H>  |  19  ----------------------------<  91  3.6   |  24  |  1.00    Ca    9.0      29 Sep 2022 05:00  Phos  3.5     09-29  Mg     2.10     09-29    TPro  7.4  /  Alb  4.1  /  TBili  0.2  /  DBili  x   /  AST  27  /  ALT  14  /  AlkPhos  71  09-28

## 2022-09-29 NOTE — CONSULT NOTE ADULT - PROBLEM SELECTOR RECOMMENDATION 4
Attempted to contact primary HCP/daughter, Vani- will reattempt     KESHA in chart reviewed.   Patient is DNR/DNI Patient with advanced dementia with behavioral disturbance   Supportive care

## 2022-09-29 NOTE — PROGRESS NOTE ADULT - PROBLEM SELECTOR PLAN 6
DVT ppx: lovenox  Diet: Pureed Diet  Dispo: pending  evaluation, dispo home vs facility; PT consult pending DVT ppx: Lovenox  Diet: Pureed Diet  Dispo: pending  evaluation, dispo home vs facility; PT consult pending

## 2022-09-29 NOTE — PROGRESS NOTE ADULT - PROBLEM SELECTOR PLAN 4
-c/w amlodipine, propranolol, lisinopril - c/w propanolol and amiodarone   - not on a/c likely due to recurrent falls

## 2022-09-29 NOTE — CONSULT NOTE ADULT - SUBJECTIVE AND OBJECTIVE BOX
Reason for Consultation:	[] Pain		[] Goals of Care		[] Non-pain symptoms    [] End of life discussion		[] Other:    HPI:  Pt a limited historian due to advanced dementia, history obtained from daughter at bedside    89 year old female with PMH of TIA, HTN, hypothyroidism, GERD, multiple falls with vertebral fractures, moderate to severe dementia presents to the ED accompanied by daughter who states pt had a virtual visit with her PMD today for worsening dementia and increased agitation at home. Per daughter, they have tried different anti-psychotics from Seroquel to recently Risperidone, none of which has been helping with her worsening dementia. Pt has been more agitated, yelling and screaming at night, briefly responds to risperidone but effects only last about 4 hours. Pt also complained of vague headache. Denies hx of recent fall or trauma. Sent in by PMD to exclude any organic cause.  (28 Sep 2022 23:05)      Interval History:   Patient seen and examined at bedside. History limited due to advanced dementia. Son, Larry Bassen, also at bedside. According to son, the patient has been "doing great" up until three days ago. He is concerned about a reversible cause for agitation. The patient currently lives at home with 24-hour HHA. The family was in the process of applying for MINDY. At baseline, the patient ambulates with rolling walker, though still requiring assistance due to unsteady gait. The patient established care with outpatient palliative care in early 2022 and was started on THC for agitation.     PERTINENT PM/SXH:   Stroke    GERD (gastroesophageal reflux disease)    HLD (hyperlipidemia)    Hypothyroidism    Recurrent falls    HTN (hypertension)    Cardiovascular disease    HTN (hypertension)    H/O cardiac arrhythmia    H/O: hypothyroidism    H/O gastroesophageal reflux (GERD)      H/O kyphoplasty    S/P thyroidectomy      FAMILY HISTORY:  FH: breast cancer (Child)      ITEMS NOT CHECKED ARE NOT PRESENT    SOCIAL HISTORY:   Significant other/partner[ ]  Children[X]  Yarsanism/Spirituality:  Substance hx:  [ ]   Tobacco hx:  [ ]   Alcohol hx: [ ]   Home Opioid hx:  [ ] I-Stop Reference No:  Living Situation: [X]Home  [ ]Long term care  [ ]Rehab [ ]Other  Home Services: [X] HHA [ ] Visting RN [ ] Hospice      ADVANCE DIRECTIVES:    MOLST  [X]  Living Will  [ ]   DECISION MAKER(s):  [X] Health Care Proxy(s)  [ ] Surrogate(s)  [ ] Guardian           Name(s): Phone Number(s):  Primary: Willa Bassen (Daughter)- [phone]   Secondary: Lawrence Bassen (Son)     BASELINE (I)ADL(s) (prior to admission):  [place]: [ ]Total  [ ] Moderate [X]Dependent    Allergies    No Known Allergies    Intolerances    MEDICATIONS  (STANDING):  aMIOdarone    Tablet 200 milliGRAM(s) Oral daily  amLODIPine   Tablet 2.5 milliGRAM(s) Oral daily  atorvastatin 20 milliGRAM(s) Oral at bedtime  enoxaparin Injectable 30 milliGRAM(s) SubCutaneous every 24 hours  levothyroxine 125 MICROGram(s) Oral daily  lisinopril 40 milliGRAM(s) Oral daily  multivitamin 1 Tablet(s) Oral daily  propranolol 40 milliGRAM(s) Oral two times a day    MEDICATIONS  (PRN):  acetaminophen     Tablet .. 650 milliGRAM(s) Oral every 6 hours PRN Temp greater or equal to 38C (100.4F), Mild Pain (1 - 3)  aluminum hydroxide/magnesium hydroxide/simethicone Suspension 30 milliLiter(s) Oral every 4 hours PRN Dyspepsia  LORazepam   Injectable 0.5 milliGRAM(s) IV Push every 4 hours PRN Agitation  melatonin 3 milliGRAM(s) Oral at bedtime PRN Insomnia  ondansetron Injectable 4 milliGRAM(s) IV Push every 8 hours PRN Nausea and/or Vomiting      PRESENT SYMPTOMS: [ ]Unable to self-report due to altered mental status  [ ] CPOT [X] PAINADs [X] RDOS  Source if other than patient:  [ ]Family   [ ]Team     Pain: [ ]yes [X]no  QOL impact -   Location -                    Aggravating factors -  Quality -  Radiation -  Timing-  Severity (0-10 scale):  Minimal acceptable level (0-10 scale):     CPOT:    https://www.sccm.org/getattachment/wwa01u96-7n5c-1q8p-7b0x-2077s7635u9s/Critical-Care-Pain-Observation-Tool-(CPOT)      PAIN AD Score: 0    http://geriatrictoolkit.[place].[email or web address]/cog/painad.pdf (press ctrl +  left click to view)    Dyspnea:                           [ ]Mild [ ]Moderate [ ]Severe  RDOS: 0   0 to 2  minimal or no respiratory distress   3  mild distress  4 to 6 moderate distress  >7 severe distress  https://homecareinformation.net/handouts/hen/Respiratory_Distress_Observation_Scale.pdf (Ctrl +  left click to view)     Anxiety:                             [ ]Mild [ ]Moderate [ ]Severe  Agitation:                          [ ]Mild [X]Moderate [ ]Severe  Fatigue:                             [ ]Mild [ ]Moderate [ ]Severe  Nausea:                             [ ]Mild [ ]Moderate [ ]Severe  Loss of appetite:              [ ]Mild [ ]Moderate [ ]Severe  Constipation:                   [ ]Mild [ ]Moderate [ ]Severe  Diarrhea:                          [ ]Mild [ ]Moderate [ ]Severe  Pruritus:                            [ ]Mild [ ]Moderate [ ]Severe  Depression:                      [ ]Mild [ ]Moderate [ ]Severe    PCSSQ[Palliative Care Spiritual Screening Question]   Severity (0-10):  Score of 4 or > indicate consideration of Chaplaincy referral.    Chaplaincy Referral: [ ] yes [ ] refused [ ] following    Caregiver [place]? : [X] yes [ ] no [ ] Deferred [ ] Declined             Social work referral [ ] Patient & Family Centered Care Referral [ ]     Anticipatory Grief present?:  [ ] yes [ ] no  [ ] Deferred                  Social work referral [ ] Chaplaincy Referral[ ]    Other Symptoms:  [X]All other review of systems negative     Palliative Performance Status Version 2:      40   %    http://npcrc.org/files/news/palliative_performance_scale_ppsv2.pdf    PHYSICAL EXAM:  Vital Signs Last 24 Hrs  T(C): 36.7 (29 Sep 2022 11:36), Max: 36.7 (28 Sep 2022 17:25)  T(F): 98.1 (29 Sep 2022 11:36), Max: 98.1 (29 Sep 2022 11:36)  HR: 79 (29 Sep 2022 11:36) (54 - 96)  BP: 163/82 (29 Sep 2022 11:36) (145/87 - 170/78)  BP(mean): 104 (29 Sep 2022 06:00) (104 - 107)  RR: 18 (29 Sep 2022 11:36) (15 - 18)  SpO2: 100% (29 Sep 2022 11:36) (100% - 100%)    Parameters below as of 29 Sep 2022 11:36  Patient On (Oxygen Delivery Method): room air         I&O's Summary      GENERAL:  [X]Alert  [ ]Oriented   [ ]Lethargic  [ ]Cachexia  [ ]Unarousable  [X]Verbal  [ ]Non-Verbal  [X] No Distress  Behavioral:   [ ] Anxiety  [ ] Delirium [X] Agitation [ ] Calm  [ ] Other  HEENT:  [X]Normal  [ ] Temporal Wasting  [ ]Dry mouth   [ ]ET Tube/Trach  [ ]Oral lesions  [ ] Mucositis  PULMONARY:   [X]Clear [ ]Tachypnea  [ ]Audible excessive secretions   [ ]Rhonchi        [ ]Right [ ]Left [ ]Bilateral  [ ]Crackles        [ ]Right [ ]Left [ ]Bilateral  [ ]Wheezing     [ ]Right [ ]Left [ ]Bilateral  [ ]Diminished breath sounds [ ]right [ ]left [ ]bilateral  CARDIOVASCULAR:    [X]Regular [ ]Irregular [ ]Tachy  [ ]Deni [ ]Murmur [ ]Other  GASTROINTESTINAL:  [X]Soft  [ ]Distended   [ ]+BS  [X]Non tender [ ]Tender  [ ]PEG [ ]OGT/ NGT  Last BM:   GENITOURINARY:  [ ]Normal [X] Incontinent   [ ]Oliguria/Anuria   [ ]Brooks  MUSCULOSKELETAL:   [ ]Normal   [X]Weakness  [ ]Bed/Wheelchair bound [ ]Edema  [  ] amputation  [  ] contraction  NEUROLOGIC:   [ ]No focal deficits  [X]Cognitive impairment  [ ]Dysphagia [ ]Dysarthria [ ]Paresis [ ]Other   SKIN: See Nursing Skin Assessment for further details  [ ]Normal    [ ]Rash  [ ]Pressure ulcer(s)       Present on admission [ ]y [ ]n   [  ]  Wound    [  ] Hyperpigmentation    CRITICAL CARE:  [ ] Shock Present  [ ]Septic [ ]Cardiogenic [ ]Neurologic [ ]Hypovolemic  [ ]  Vasopressors [ ]  Inotropes   [ ]Respiratory failure present [ ]Mechanical ventilation [ ]Non-invasive ventilatory support [ ]High flow    [ ]Acute  [ ]Chronic [ ]Hypoxic  [ ]Hypercarbic [ ]Other  [ ]Other organ failure     LABS:                        11.1   5.59  )-----------( 191      ( 29 Sep 2022 05:00 )             33.1       145  |  108<H>  |  19  ----------------------------<  91  3.6   |  24  |  1.00    Ca    9.0      29 Sep 2022 05:00  Phos  3.5       Mg     2.10         TPro  7.4  /  Alb  4.1  /  TBili  0.2  /  DBili  x   /  AST  27  /  ALT  14  /  AlkPhos  71      Urinalysis Basic - ( 28 Sep 2022 19:30 )    Color: Yellow / Appearance: Clear / S.020 / pH: x  Gluc: x / Ketone: Negative  / Bili: Negative / Urobili: <2 mg/dL   Blood: x / Protein: Trace / Nitrite: Negative   Leuk Esterase: Negative / RBC: x / WBC x   Sq Epi: x / Non Sq Epi: x / Bacteria: x      CAPILLARY BLOOD GLUCOSE          RADIOLOGY & ADDITIONAL STUDIES:    PROTEIN CALORIE MALNUTRITION PRESENT: [ ]mild [ ]moderate [ ]severe [ ]underweight [ ]morbid obesity  https://www.andeal.org/vault/2440/web/files/ONC/Table_Clinical%20Characteristics%20to%20Document%20Malnutrition-White%20JV%20et%20al%2020.pdf    Height (cm): 154.9 (22 @ 17:25)  Weight (kg): 53.4 (22 @ 23:45)  BMI (kg/m2): 22.3 (22 @ 23:45)    [ ]PPSV2 < or = to 30% [ ]significant weight loss  [ ]poor nutritional intake  [ ]anasarca [ ]Artificial Nutrition      REFERRALS:   [ ]Chaplaincy  [ ]Hospice  [ ]Child Life  [ ]Social Work  [ ]Case management [ ]Holistic Therapy     Goals of Care Document:  HPI:  Pt a limited historian due to advanced dementia, history obtained from daughter at bedside    89 year old female with PMH of TIA, HTN, hypothyroidism, GERD, multiple falls with vertebral fractures, moderate to severe dementia presents to the ED accompanied by daughter who states pt had a virtual visit with her PMD today for worsening dementia and increased agitation at home. Per daughter, they have tried different anti-psychotics from Seroquel to recently Risperidone, none of which has been helping with her worsening dementia. Pt has been more agitated, yelling and screaming at night, briefly responds to risperidone but effects only last about 4 hours. Pt also complained of vague headache. Denies hx of recent fall or trauma. Sent in by PMD to exclude any organic cause.  (28 Sep 2022 23:05)    Interval History:   Patient seen and examined at bedside. History limited due to advanced dementia. Son, Larry Bassen, also at bedside. According to son, the patient has been "doing great" up until three days ago. He is concerned about a reversible cause for agitation. The patient currently lives at home with 24-hour HHA. The family was in the process of applying for facility care. At baseline, the patient able to ambulate unsteadily with rolling walker, though still also requiring assistance due to unsteady gait. The patient established care with outpatient palliative care in early 2022 and was started on THC for agitation.     PERTINENT PM/SXH:   Stroke  GERD (gastroesophageal reflux disease)  HLD (hyperlipidemia)  Hypothyroidism  Recurrent falls  HTN (hypertension)  Cardiovascular disease  HTN (hypertension)  H/O cardiac arrhythmia  H/O: hypothyroidism  H/O gastroesophageal reflux (GERD)  H/O kyphoplasty  S/P thyroidectomy    FAMILY HISTORY:  FH: breast cancer (Child)    ITEMS NOT CHECKED ARE NOT PRESENT    SOCIAL HISTORY:   Significant other/partner[ ]  Children[X]  Sikhism/Spirituality:  Substance hx:  [ ]   Tobacco hx:  [ ]   Alcohol hx: [ ]   Home Opioid hx:  [ ] I-Stop Reference No:  Living Situation: [X]Home  [ ]Long term care  [ ]Rehab [ ]Other  Home Services: [X] HHA [ ] Visting RN [ ] Hospice      ADVANCE DIRECTIVES:    MOLST  [X]  Living Will  [ ]   DECISION MAKER(s):  [X] Health Care Proxy(s)  [ ] Surrogate(s)  [ ] Guardian           Name(s): Phone Number(s):  Primary: Willa Bassen (Daughter)- [phone] ; [phone]  Secondary: Lawrence Bassen (Son)     BASELINE (I)ADL(s) (prior to admission):  [place]: [ ]Total  [ ] Moderate [X]Dependent    Allergies    No Known Allergies    Intolerances    MEDICATIONS  (STANDING):  aMIOdarone    Tablet 200 milliGRAM(s) Oral daily  amLODIPine   Tablet 2.5 milliGRAM(s) Oral daily  atorvastatin 20 milliGRAM(s) Oral at bedtime  enoxaparin Injectable 30 milliGRAM(s) SubCutaneous every 24 hours  levothyroxine 125 MICROGram(s) Oral daily  lisinopril 40 milliGRAM(s) Oral daily  multivitamin 1 Tablet(s) Oral daily  propranolol 40 milliGRAM(s) Oral two times a day    MEDICATIONS  (PRN):  acetaminophen     Tablet .. 650 milliGRAM(s) Oral every 6 hours PRN Temp greater or equal to 38C (100.4F), Mild Pain (1 - 3)  aluminum hydroxide/magnesium hydroxide/simethicone Suspension 30 milliLiter(s) Oral every 4 hours PRN Dyspepsia  LORazepam   Injectable 0.5 milliGRAM(s) IV Push every 4 hours PRN Agitation  melatonin 3 milliGRAM(s) Oral at bedtime PRN Insomnia  ondansetron Injectable 4 milliGRAM(s) IV Push every 8 hours PRN Nausea and/or Vomiting      PRESENT SYMPTOMS: [x ]Unable to self-report due to altered mental status  [ ] CPOT [X] PAINADs [X] RDOS  Source if other than patient:  [ ]Family   [ ]Team     Pain: [ ]yes [X]no  QOL impact -   Location -                    Aggravating factors -  Quality -  Radiation -  Timing-  Severity (0-10 scale):  Minimal acceptable level (0-10 scale):     CPOT:    https://www.sccm.org/getattachment/nyi28p04-0d8v-1f6x-1s6e-1828s3992m4w/Critical-Care-Pain-Observation-Tool-(CPOT)      PAIN AD Score: 0    http://geriatrictoolkit.[facility]/cog/painad.pdf (press ctrl +  left click to view)    Dyspnea:                           [ ]Mild [ ]Moderate [ ]Severe  RDOS: 0   0 to 2  minimal or no respiratory distress   3  mild distress  4 to 6 moderate distress  >7 severe distress  https://homecareinformation.net/handouts/hen/Respiratory_Distress_Observation_Scale.pdf (Ctrl +  left click to view)     Anxiety:                             [ ]Mild [ ]Moderate [ ]Severe  Agitation:                          [ ]Mild [X]Moderate [ ]Severe  Fatigue:                             [ ]Mild [ ]Moderate [ ]Severe  Nausea:                             [ ]Mild [ ]Moderate [ ]Severe  Loss of appetite:              [ ]Mild [ ]Moderate [ ]Severe  Constipation:                   [ ]Mild [ ]Moderate [ ]Severe  Diarrhea:                          [ ]Mild [ ]Moderate [ ]Severe      PCSSQ[Palliative Care Spiritual Screening Question]   Severity (0-10):  Score of 4 or > indicate consideration of Chaplaincy referral.    Chaplaincy Referral: [ ] yes [ ] refused [ ] following    Caregiver [place]? : [X] yes [ ] no [ ] Deferred [ ] Declined             Social work referral [ x] Patient & Family Centered Care Referral [ ]     Anticipatory Grief present?:  [x ] yes [ ] no  [ ] Deferred                  Social work referral [x ] Chaplaincy Referral[ ]    Other Symptoms:  [X]All other review of systems negative     Palliative Performance Status Version 2:      40   %    http://npcrc.org/files/news/palliative_performance_scale_ppsv2.pdf    PHYSICAL EXAM:  Vital Signs Last 24 Hrs  T(C): 36.7 (29 Sep 2022 11:36), Max: 36.7 (28 Sep 2022 17:25)  T(F): 98.1 (29 Sep 2022 11:36), Max: 98.1 (29 Sep 2022 11:36)  HR: 79 (29 Sep 2022 11:36) (54 - 96)  BP: 163/82 (29 Sep 2022 11:36) (145/87 - 170/78)  BP(mean): 104 (29 Sep 2022 06:00) (104 - 107)  RR: 18 (29 Sep 2022 11:36) (15 - 18)  SpO2: 100% (29 Sep 2022 11:36) (100% - 100%)    Parameters below as of 29 Sep 2022 11:36  Patient On (Oxygen Delivery Method): room air         I&O's Summary      GENERAL:  [X]Alert  [ ]Oriented   [ ]Lethargic  [ ]Cachexia  [ ]Unarousable  [X]Verbal  [ ]Non-Verbal  [X] No Distress  Behavioral:   [ ] Anxiety  [ ] Delirium [X] Agitation [ ] Calm  [ ] Other  HEENT:  [X]Normal  [ ] Temporal Wasting  [ ]Dry mouth   [ ]ET Tube/Trach  [ ]Oral lesions  [ ] Mucositis  PULMONARY:   [X]Clear [ ]Tachypnea  [ ]Audible excessive secretions   [ ]Rhonchi        [ ]Right [ ]Left [ ]Bilateral  [ ]Crackles        [ ]Right [ ]Left [ ]Bilateral  [ ]Wheezing     [ ]Right [ ]Left [ ]Bilateral  [ ]Diminished breath sounds [ ]right [ ]left [ ]bilateral  CARDIOVASCULAR:    [X]Regular [ ]Irregular [ ]Tachy  [ ]Deni [ ]Murmur [ ]Other  GASTROINTESTINAL:  [X]Soft  [ ]Distended   [ ]+BS  [X]Non tender [ ]Tender  [ ]PEG [ ]OGT/ NGT  Last BM: fecal incontinence   GENITOURINARY:  [ ]Normal [X] Incontinent   [ ]Oliguria/Anuria   [ ]Brooks  MUSCULOSKELETAL:   [ ]Normal   [X]Weakness  [ ]Bed/Wheelchair bound [ ]Edema  [  ] amputation  [  ] contraction  NEUROLOGIC:   [ x]No focal deficits  [X]Cognitive impairment  [ ]Dysphagia [ ]Dysarthria [ ]Paresis [ ]Other   SKIN: Incontinence Associated Dermatitis. See Nursing Skin Assessment for further details  [ ]Normal    [ ]Rash  [ ]Pressure ulcer(s)       Present on admission [ ]y [ ]n   [  ]  Wound    [  ] Hyperpigmentation    CRITICAL CARE:  [ ] Shock Present  [ ]Septic [ ]Cardiogenic [ ]Neurologic [ ]Hypovolemic  [ ]  Vasopressors [ ]  Inotropes   [ ]Respiratory failure present [ ]Mechanical ventilation [ ]Non-invasive ventilatory support [ ]High flow    [ ]Acute  [ ]Chronic [ ]Hypoxic  [ ]Hypercarbic [ ]Other  [ ]Other organ failure     LABS:                        11.1   5.59  )-----------( 191      ( 29 Sep 2022 05:00 )             33.1   -    145  |  108<H>  |  19  ----------------------------<  91  3.6   |  24  |  1.00    Ca    9.0      29 Sep 2022 05:00  Phos  3.5       Mg     2.10         TPro  7.4  /  Alb  4.1  /  TBili  0.2  /  DBili  x   /  AST  27  /  ALT  14  /  AlkPhos  71      Urinalysis Basic - ( 28 Sep 2022 19:30 )    Color: Yellow / Appearance: Clear / S.020 / pH: x  Gluc: x / Ketone: Negative  / Bili: Negative / Urobili: <2 mg/dL   Blood: x / Protein: Trace / Nitrite: Negative   Leuk Esterase: Negative / RBC: x / WBC x   Sq Epi: x / Non Sq Epi: x / Bacteria: x      CAPILLARY BLOOD GLUCOSE    RADIOLOGY & ADDITIONAL STUDIES:  CT Head 2022  1.  Right occipital small remote cortical and subcortical infarction  2.  Ischemic white matter disease not atypical for age  3.  Diffuse brain volume loss overall upper range typical for age noting   differential posterior cerebral hemispheric white matter volume loss  4.  Intracranial atherosclerosis  5.  Patient motion limited scan    CXRAY 2022  IMPRESSION:  Clear lungs.    PROTEIN CALORIE MALNUTRITION PRESENT: [ ]mild [ ]moderate [ ]severe [ ]underweight [ ]morbid obesity  https://www.andeal.org/vault/2440/web/files/ONC/Table_Clinical%20Characteristics%20to%20Document%20Malnutrition-White%20JV%20et%20al%2020.pdf    Height (cm): 154.9 (22 @ 17:25)  Weight (kg): 53.4 (22 @ 23:45)  BMI (kg/m2): 22.3 (22 @ 23:45)    [ ]PPSV2 < or = to 30% [ ]significant weight loss  [ ]poor nutritional intake  [ ]anasarca [ ]Artificial Nutrition      REFERRALS:   [ ]Chaplaincy  [ ]Hospice  [ ]Child Life  [ ]Social Work  [ x]Case management [ ]Holistic Therapy     ************************************************************************  PALLIATIVE MEDICINE COORDINATION OF CARE DOCUMENTATION  [x] Inpatient Consult  [ ] Other:  ************************************************************************    HPI reviewed   ------------------------------------------------------------------------    MEDICATION REVIEW:  --- Pls refer to current medicatons in the body of this note  ISTOP REFERENCE: [id number]  Others' Prescriptions  Patient Name: Phyllis BassenBirth Date: 1926  Address: [address] [place] [id number]: Female  Rx Written	Rx Dispensed	Drug	Quantity	Days Supply	Prescriber Name	Prescriber Linda #	Payment Method	Dispenser  2022	curaleaf (20:1) 5mgthc and 0.24 mgcbd/0.5 ml tct lemon	1	2	CalPradeep lo J, M D	[id number]	Cash	Curaleaf [facility]  2022	curaleaf (1:20) 0.24mg thc and 5mg cbd/0.5 ml tct orange	1	2	Pradeep Gallegos J, M D	[id number]	Cash	Curaleaf [facility]  2022	curaleaf (1:20) 0.24mg thc and 5mg cbd/0.5 ml tct orange	1	2	Pradeep Gallegos J, M D	[id number]	Cash	Curaleaf [facility]  2022	curaleaf (1:20) 0.24mg thc and 5mg cbd/0.5 ml tct orange	1	2	Pradeep Gallegos J, M D	[id number]	Cash	Curaleaf [facility]  2022	curaleaf (1:20) 0.24mg thc and 5mg cbd/0.5 ml tct orange	1	2	Pradeep Gallegos J, M D	[id number]	[facility]  2022	green capsule 5mg thc and 5mg cbd/capsule 30ct	1	5	Pradeep Gallegos J, M D	[id number]	[facility]  2022	curaleaf (1:1) 2.5mgthc and 2.5 mgcbd/0.5 ml tct (orange)	1	2	Pradeep Gallegos J, M D	[id number]	[facility]  2022	curaleaf (1:1) 2.5mgthc and 2.5 mgcbd/0.5 ml tct (orange)	1	2	Pradeep Gallegos J, M D	[id number]	[facility]  --- PRN usage: The patient HAS NOT used PRN's in the last 24h.  ------------------------------------------------------------------------  COORDINATION OF CARE:  --- Palliative Care consulted for: goals of care   --- Patient assessed: 2022  ADVANCE CARE PLANNING  --- Code status: DNR/DNI  --- MOLST reviewed in chart: Yes  --- HCP/ Surrogate: Daughter is primary HCP; son is secondary HCP  --- GOC document found in [facility]: Yes  --- HCP/ Living will/ Other advanced directives in [facility]: HCP paperwork found in physical chart. Living Will found in outpatient [employer] record.   ------------------------------------------------------------------------  CARE PROVIDER DOCUMENTATION:  --- SW/CM notes reviewed   --- Medicine notes reviewed   PLAN OF CARE  --- Known admissions in past year: 1  --- Current admit date: 2022  --- LOS: 1 day  --- LACE score: 9  --- Current dispo plan: TO BE DETERMINED  ------------------------------------------------------------------------  --- Chart reviewed: 30 Minutes [including time used to gather, review and transfer data to this note]    Prolonged services rendered, as part of this patient's care provided by Palliative Medicine, include: i.chart review for provider and ancillary service documentation, ii.pertinent diagnostics including laboratory and imaging studies,iii. medication review including PRN use, iv. admission history including previous palliative care encounters and GOC notes, v.advance care planning documents including HCP and MOLST forms in [facility]. Part of Palliative Medicine extended evaluation and management also involves coordination of care with our IDT, the primary and consulting codey, and unit CM/SW and Hospice if eligible. Recommendations based on the information gathered and discussed are outline in the AP of our notes.    ************************************************************************

## 2022-09-29 NOTE — BH CONSULTATION LIAISON ASSESSMENT NOTE - SUMMARY
Patient is a 89 year old female  with PMH of TIA, afib not on a/c, HTN, hypothyroidism, GERD, multiple falls with vertebral fractures, moderate to severe dementia presents with worsening agitation likely 2/2 progressive dementia, admitted to r/o organic causes. Patient comes from home, lives with 24/7 aid as well as daughter in same complex. Patient with no previous psychiatric hx prior to dementia. Has had medication trials given by her geriatrician. SPoke with daughter and son. Daughter reports patient has tried Seroquel, Remeron, and trazodone all which made patient agitation worse. Trailed risperidone which helped for a bit but was inconsistent and patient still had outbreaks for agitation and yelling. This past week patient was uncontrollable, yelling and agitated prompting there ED visit. Patient mostly gets agitated in the evening but timing can be unpredictable. No aggression towards others. NO harm to self.     PLAN  - manual calculation of qtc to determine safety of medications  -- if qtc < 500 can use zyprexa 1.25mg PO/IM q6hrs for agitation  -- if qtc > 500, no antipsychotic use at his time and can use low dose ativan ONLY for SEVERE agitation  - defer level of observation to primary team  - tsh elevated  - b12, folate , ct head, ua checked - delirium work up  - ongoing discussion with family in regards to standing medications

## 2022-09-29 NOTE — CONSULT NOTE ADULT - PROBLEM SELECTOR RECOMMENDATION 2
Patient requires support with ADLs. Please assist with ADLs PRN.  Skin care as per protocol. - in setting of advanced dementia  - CT Head- 1.  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  Ischemic white matter disease not atypical for age 3.  Diffuse brain volume loss overall upper range typical for age noting differential posterior cerebral hemispheric white matter volume loss 4.  Intracranial atherosclerosis   - UA negative   - please coordinate care with patient's family   - Frequent reassurance and verbal orientation   - Family members or other familiar persons by his bedside  - Monitor for constipation, urinary retention, pain, hunger, thirst etc.  - Promote sleep wake cycle and reorientation as indicated  - Minimize use of benzodiazepines, opioids, anticholinergics, and other deliriogenic agents whenever possible.

## 2022-09-30 NOTE — PROGRESS NOTE ADULT - SUBJECTIVE AND OBJECTIVE BOX
[facility] Division of Hospital Medicine  Viridianaabimbola Jossie EM  Pager [phone]  Available via MS Teams    SUBJECTIVE / OVERNIGHT EVENTS: No acute events overnight. Patient sittin up in bed comfortably. AAOx2 this AM. Daughter at bedside stating patient appears calm.     ADDITIONAL REVIEW OF SYSTEMS:    MEDICATIONS  (STANDING):  aMIOdarone    Tablet 200 milliGRAM(s) Oral daily  amLODIPine   Tablet 2.5 milliGRAM(s) Oral daily  atorvastatin 20 milliGRAM(s) Oral at bedtime  enoxaparin Injectable 30 milliGRAM(s) SubCutaneous every 24 hours  levothyroxine 125 MICROGram(s) Oral daily  lisinopril 40 milliGRAM(s) Oral daily  multivitamin 1 Tablet(s) Oral daily  propranolol 40 milliGRAM(s) Oral two times a day    MEDICATIONS  (PRN):  acetaminophen     Tablet .. 650 milliGRAM(s) Oral every 6 hours PRN Temp greater or equal to 38C (100.4F), Mild Pain (1 - 3)  aluminum hydroxide/magnesium hydroxide/simethicone Suspension 30 milliLiter(s) Oral every 4 hours PRN Dyspepsia  LORazepam   Injectable 0.5 milliGRAM(s) IV Push every 4 hours PRN Agitation  melatonin 3 milliGRAM(s) Oral at bedtime PRN Insomnia  ondansetron Injectable 4 milliGRAM(s) IV Push every 8 hours PRN Nausea and/or Vomiting      I&O's Summary      PHYSICAL EXAM:  Vital Signs Last 24 Hrs  T(C): 36.6 (30 Sep 2022 11:02), Max: 36.8 (30 Sep 2022 05:30)  T(F): 97.9 (30 Sep 2022 11:02), Max: 98.2 (30 Sep 2022 05:30)  HR: 67 (30 Sep 2022 11:02) (59 - 67)  BP: 118/90 (30 Sep 2022 11:02) (118/90 - 160/76)  BP(mean): --  RR: 18 (30 Sep 2022 11:02) (17 - 18)  SpO2: 96% (30 Sep 2022 11:02) (95% - 100%)    Parameters below as of 30 Sep 2022 11:02  Patient On (Oxygen Delivery Method): room air      CONSTITUTIONAL: NAD, frail elderly female, laying in bed comfortably   EYES: PERRLA; conjunctiva and sclera clear   RESPIRATORY: Normal respiratory effort; lungs are clear to auscultation bilaterally  CARDIOVASCULAR: Regular rate and rhythm, normal S1 and S2, no murmur/rub/gallop; No lower extremity edema;   ABDOMEN: Nontender to palpation, normoactive bowel sounds, no rebound/guarding;   MUSCULOSKELETAL:   no clubbing or cyanosis of digits; no joint swelling or tenderness to palpation  PSYCH: A+O to person, place, pleasant affects   NEUROLOGY: no gross sensory deficits     LABS:                        11.1   5.59  )-----------( 191      ( 29 Sep 2022 05:00 )             33.1         145  |  108<H>  |  19  ----------------------------<  91  3.6   |  24  |  1.00    Ca    9.0      29 Sep 2022 05:00  Phos  3.5       Mg     2.10         TPro  7.4  /  Alb  4.1  /  TBili  0.2  /  DBili  x   /  AST  27  /  ALT  14  /  AlkPhos  71            Urinalysis Basic - ( 28 Sep 2022 19:30 )    Color: Yellow / Appearance: Clear / S.020 / pH: x  Gluc: x / Ketone: Negative  / Bili: Negative / Urobili: <2 mg/dL   Blood: x / Protein: Trace / Nitrite: Negative   Leuk Esterase: Negative / RBC: x / WBC x   Sq Epi: x / Non Sq Epi: x / Bacteria: x        Culture - Urine (collected 28 Sep 2022 19:55)  Source: Clean Catch Clean Catch (Midstream)  Final Report (29 Sep 2022 22:37):    <10,000 CFU/mL Normal Urogenital Umm      SARS-CoV-2: NotDetec (28 Sep 2022 18:30)

## 2022-09-30 NOTE — PHYSICAL THERAPY INITIAL EVALUATION ADULT - ADDITIONAL COMMENTS
Unable to gather accurate information from pt 2/2 confusion.  Care coordinator notes reporting pt has a 24 hr aide in a building with an elevator and owns a walker.

## 2022-09-30 NOTE — PROGRESS NOTE ADULT - PROBLEM SELECTOR PLAN 3
- QTC on outpt ekg 508, likely 2/2 risperidone use  - repeat EKG in AM to evaluate   - avoid further QTc prolonging meds, has ativan PRN for agitation, please attempt to redirect patient prior to administration  - per psych will consider start haldol 0.25mg PO once QTc <500

## 2022-09-30 NOTE — PROGRESS NOTE ADULT - PROBLEM SELECTOR PLAN 6
Case reviewed with outpatient geriatric team  Thank you for allowing us to participate in your patient's care. Please page [phone] for any questions/concerns.

## 2022-09-30 NOTE — BH CONSULTATION LIAISON PROGRESS NOTE - NSBHASSESSMENTFT_PSY_ALL_CORE
Patient is a 89 year old female  with PMH of TIA, afib not on a/c, HTN, hypothyroidism, GERD, multiple falls with vertebral fractures, moderate to severe dementia presents with worsening agitation likely 2/2 progressive dementia, admitted to r/o organic causes. Patient comes from home, lives with 24/7 aid as well as daughter in same complex. Patient with no previous psychiatric hx prior to dementia. Has had medication trials given by her geriatrician (Seroquel, Remeron, and trazodone all which made patient agitation worse. Tried risperidone which helped for a bit but was inconsistent and patient still had outbreaks for agitation and yelling). This past week patient was uncontrollable, yelling and agitated prompting there ED visit. Patient mostly gets agitated in the evening but timing can be unpredictable. No aggression towards others. No harm to self. Discussed with the daughter that levothyroxine should be given on an empty stomach, separate from other medications. Patient is c/o headache.  TSH 19, , CT head- no acute changes, Folate elevated, B12 wnl, UA wnl.    PLAN  - MANUAL CALCULATION of QTC to determine safety of medications  --START Haldol 0.25 mg PO if QTC< 500 after MANUAL calculation.  -- if qtc < 500 can use zyprexa 1.25mg PO/IM q6hrs for agitation  -- if qtc > 500, no antipsychotic use at his time and can use low dose ativan ONLY for SEVERE agitation  - defer level of observation to primary team  - T4 free, urine drug screen.  - due to complains of headache, please, assess the pain level and address her pain as appropriate.  the pain may contribute to her confusion.  - ongoing discussion with family in regards to standing medications   Patient is a 89 year old female  with PMH of TIA, afib not on a/c, HTN, hypothyroidism, GERD, multiple falls with vertebral fractures, moderate to severe dementia presents with worsening agitation likely 2/2 progressive dementia, admitted to r/o organic causes. Patient comes from home, lives with 24/7 aid as well as daughter in same complex. Patient with no previous psychiatric hx prior to dementia. Has had medication trials given by her geriatrician (Seroquel, Remeron, and trazodone all which made patient agitation worse. Tried risperidone which helped for a bit but was inconsistent and patient still had outbreaks for agitation and yelling). This past week patient was uncontrollable, yelling and agitated prompting there ED visit. Patient mostly gets agitated in the evening but timing can be unpredictable. No aggression towards others. No harm to self. Discussed with the daughter that levothyroxine should be given on an empty stomach, separate from other medications. Patient is c/o headache.  TSH 19, , CT head- no acute changes, Folate elevated, B12 wnl, UA wnl.    PLAN  - MANUAL CALCULATION of QTC to determine safety of medications  --START Haldol 0.25 mg PO if QTC< 500 after MANUAL calculation.  -- if qtc < 500 can use zyprexa 1.25mg PO/IM q6hrs for agitation  -- if qtc > 500, no antipsychotic use at his time and can use low dose ativan ONLY for SEVERE agitation  - defer level of observation to primary team  - f/u Free T4, urine drug screen.  - due to complains of headache, please, assess the pain level and address her pain as appropriate.  the pain may contribute to her confusion.  - ongoing discussion with family in regards to standing medications

## 2022-09-30 NOTE — PHYSICAL THERAPY INITIAL EVALUATION ADULT - LEVEL OF INDEPENDENCE: SIT/STAND, REHAB EVAL
x2.  LE weakness and cognition limiting ability to stand upright./maximum assist (25% patients effort)

## 2022-09-30 NOTE — PHYSICAL THERAPY INITIAL EVALUATION ADULT - PERTINENT HX OF CURRENT PROBLEM, REHAB EVAL
89 year old female with PMH of TIA, HTN, hypothyroidism, GERD, multiple falls with vertebral fractures, moderate to severe dementia presents to the ED accompanied by daughter who states pt had a virtual visit with her PMD today for worsening dementia and increased agitation at home.

## 2022-09-30 NOTE — PROGRESS NOTE ADULT - SUBJECTIVE AND OBJECTIVE BOX
SUBJECTIVE AND OBJECTIVE:  Patient seen and examined with daughter Vani at bedside.   Per daughter, patient has been calmer today. Patient resting comfortably.     INTERVAL HPI/OVERNIGHT EVENTS:  No acute events.     Allergies    No Known Allergies    Intolerances    MEDICATIONS  (STANDING):  aMIOdarone    Tablet 200 milliGRAM(s) Oral daily  amLODIPine   Tablet 2.5 milliGRAM(s) Oral daily  atorvastatin 20 milliGRAM(s) Oral at bedtime  enoxaparin Injectable 30 milliGRAM(s) SubCutaneous every 24 hours  levothyroxine 125 MICROGram(s) Oral daily  lisinopril 40 milliGRAM(s) Oral daily  multivitamin 1 Tablet(s) Oral daily  propranolol 40 milliGRAM(s) Oral two times a day    MEDICATIONS  (PRN):  acetaminophen     Tablet .. 650 milliGRAM(s) Oral every 6 hours PRN Temp greater or equal to 38C (100.4F), Mild Pain (1 - 3)  aluminum hydroxide/magnesium hydroxide/simethicone Suspension 30 milliLiter(s) Oral every 4 hours PRN Dyspepsia  LORazepam   Injectable 0.5 milliGRAM(s) IV Push every 4 hours PRN Agitation  melatonin 3 milliGRAM(s) Oral at bedtime PRN Insomnia  ondansetron Injectable 4 milliGRAM(s) IV Push every 8 hours PRN Nausea and/or Vomiting      ITEMS UNCHECKED ARE NOT PRESENT    PRESENT SYMPTOMS: [ ]Unable to self-report due to altered mental status- see [ ] CPOT [x ] PAINADS [x ] RDOS  Source if other than patient:  [ ]Family   [ ]Team     Pain:  [ ]yes [ x]no  QOL impact -   Location -                    Aggravating factors -  Quality -  Radiation -  Timing-  Severity (0-10 scale):  Minimal acceptable level (0-10 scale):     Dyspnea:                           [ ]Mild [ ]Moderate [ ]Severe  Anxiety:                             [ ]Mild [ ]Moderate [ ]Severe  Agitation:                          [ ]Mild [ ]Moderate [ ]Severe  Fatigue:                             [ ]Mild [ ]Moderate [ ]Severe  Nausea:                             [ ]Mild [ ]Moderate [ ]Severe  Loss of appetite:              [ ]Mild [ ]Moderate [ ]Severe  Constipation:                   [ ]Mild [ ]Moderate [ ]Severe  Diarrhea:                          [ ]Mild [ ]Moderate [ ]Severe      CPOT:    https://www.[email or web address].org/getattachment/[email or web address]/Critical-Care-Pain-Observation-Tool-(CPOT)    PCSSQ[Palliative Care Spiritual Screening Question]   Severity (0-10): Deferred  Score of 4 or > indicate consideration of Chaplaincy referral.  Chaplaincy Referral: [ ] yes [ ] refused [ ] following [x ] deferred    Caregiver Nisswa? : [x ] yes [ ] no [ ] Declined [ ] Deferred              Social work referral [ x] Patient & Family Centered Care Referral [ ]     Anticipatory Grief present?:  [ ] yes [ ] no  [ x] Deferred                  Social work referral [] Chaplaincy Referral[ ]    Other Symptoms:  [x ]All other review of systems negative     PHYSICAL EXAM:  Vital Signs Last 24 Hrs  T(C): 36.6 (30 Sep 2022 11:02), Max: 36.8 (30 Sep 2022 05:30)  T(F): 97.9 (30 Sep 2022 11:02), Max: 98.2 (30 Sep 2022 05:30)  HR: 67 (30 Sep 2022 11:) (59 - 67)  BP: 118/90 (30 Sep 2022 11:02) (118/90 - 160/76)  BP(mean): --  RR: 18 (30 Sep 2022 11:) (17 - 18)  SpO2: 96% (30 Sep 2022 11:) (95% - 100%)    Parameters below as of 30 Sep 2022 11:02  Patient On (Oxygen Delivery Method): room air         I&O's Summary     GENERAL:  [x]Alert  [ ]Oriented   [ ]Lethargic  [ ]Cachexia  [ ]Unarousable  [X]Verbal  [ ]Non-Verbal  [X] No Distress  Behavioral:   [ ] Anxiety  [ ] Delirium [] Agitation [x ] Calm  [ ] Other  HEENT:  [X]Normal  [ ] Temporal Wasting  [ ]Dry mouth   [ ]ET Tube/Trach  [ ]Oral lesions  [ ] Mucositis  PULMONARY:   [X]Clear [ ]Tachypnea  [ ]Audible excessive secretions   [ ]Rhonchi        [ ]Right [ ]Left [ ]Bilateral  [ ]Crackles        [ ]Right [ ]Left [ ]Bilateral  [ ]Wheezing     [ ]Right [ ]Left [ ]Bilateral  [ ]Diminished breath sounds [ ]right [ ]left [ ]bilateral  CARDIOVASCULAR:    [X]Regular [ ]Irregular [ ]Tachy  [ ]Deni [ ]Murmur [ ]Other  GASTROINTESTINAL:  [X]Soft  [ ]Distended   [ ]+BS  [X]Non tender [ ]Tender  [ ]PEG [ ]OGT/ NGT  Last BM: fecal incontinence   GENITOURINARY:  [ ]Normal [X] Incontinent   [ ]Oliguria/Anuria   [ ]Brooks  MUSCULOSKELETAL:   [ ]Normal   [X]Weakness  [ ]Bed/Wheelchair bound [ ]Edema  [  ] amputation  [  ] contraction  NEUROLOGIC:   [ x]No focal deficits  [X]Cognitive impairment  [ ]Dysphagia [ ]Dysarthria [ ]Paresis [ ]Other   SKIN: Incontinence Associated Dermatitis. See Nursing Skin Assessment for further details  [ ]Normal    [ ]Rash  [ ]Pressure ulcer(s)       Present on admission [ ]y [ ]n   [  ]  Wound    [  ] Hyperpigmentation    CRITICAL CARE:  [ ]Shock Present  [ ]Septic [ ]Cardiogenic [ ]Neurologic [ ]Hypovolemic  [ ]Vasopressors [ ]Inotropes  [ ]Respiratory failure present [ ]Mechanical Ventilation [ ]Non-invasive ventilatory support [ ]High-Flow   [ ]Acute  [ ]Chronic [ ]Hypoxic  [ ]Hypercarbic [ ]Other  [ ]Other organ failure     LABS:                        11.1   5.59  )-----------( 191      ( 29 Sep 2022 05:00 )             33.1   -    145  |  108<H>  |  19  ----------------------------<  91  3.6   |  24  |  1.00    Ca    9.0      29 Sep 2022 05:00  Phos  3.5       Mg     2.10         TPro  7.4  /  Alb  4.1  /  TBili  0.2  /  DBili  x   /  AST  27  /  ALT  14  /  AlkPhos  71      Urinalysis Basic - ( 28 Sep 2022 19:30 )    Color: Yellow / Appearance: Clear / S.020 / pH: x  Gluc: x / Ketone: Negative  / Bili: Negative / Urobili: <2 mg/dL   Blood: x / Protein: Trace / Nitrite: Negative   Leuk Esterase: Negative / RBC: x / WBC x   Sq Epi: x / Non Sq Epi: x / Bacteria: x      CAPILLARY BLOOD GLUCOSE    RADIOLOGY & ADDITIONAL STUDIES: reviewed     Protein Calorie Malnutrition Present: [ ]mild [ ]moderate [ ]severe [ ]underweight [ ]morbid obesity  https://www.andeal.org/vault/2440/web/files/ONC/Table_Clinical%20Characteristics%20to%20Document%20Malnutrition-White%20JV%20et%20al%2020.pdf    Height (cm): 154.9 (22 @ 17:25)  Weight (kg): 53.4 (22 @ 23:45)  BMI (kg/m2): 22.3 (22 @ 23:45)    [ ]PPSV2 < or = 30%  [ ]significant weight loss [ ]poor nutritional intake [ ]anasarca   [ ]Artificial Nutrition    REFERRALS:   [ ]Chaplaincy  [ ]Hospice  [ ]Child Life  [ ]Social Work  [ x]Case management [ ]Holistic Therapy

## 2022-09-30 NOTE — PROGRESS NOTE ADULT - PROBLEM SELECTOR PLAN 1
- Pt p/w worsening agitation, not well controlled with risperidone suspect likely 2/2 progression of dementia    - labs wnl, UA negative, RVP neg, VSS; no signs of active infection   - CTH negative for acute stroke or hemorrhage, notable for ischemic changes consistent with elderly age   -

## 2022-09-30 NOTE — BH CONSULTATION LIAISON PROGRESS NOTE - NSBHATTESTCOMMENTATTENDFT_PSY_A_CORE
patient very confused but pleasant, AOx1, answers in vague statements appears to have some insight into her limitations and guards against detection, no AH/VH  patient very confused but pleasant, AOx1, answers in vague statements appears to have some insight into her limitations and guards against detection, no AH/VH     would still benefit from antipsychotic however QTc prolonged, may have secondary to bundle branch block QRS prolonged, please manually correct and document as QT prolongation would interfere with psych med mgmt

## 2022-09-30 NOTE — PROGRESS NOTE ADULT - PROBLEM SELECTOR PLAN 2
- in setting of advanced dementia  - CT Head- 1.  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  Ischemic white matter disease not atypical for age 3.  Diffuse brain volume loss overall upper range typical for age noting differential posterior cerebral hemispheric white matter volume loss 4.  Intracranial atherosclerosis   - UA negative   - please coordinate care with patient's family   - Frequent reassurance and verbal orientation   - Family members or other familiar persons by his bedside  - Monitor for constipation, urinary retention, pain, hunger, thirst etc.  - Promote sleep wake cycle and reorientation as indicated  - Minimize use of benzodiazepines, opioids, anticholinergics, and other deliriogenic agents whenever possible.

## 2022-09-30 NOTE — PROGRESS NOTE ADULT - PROBLEM SELECTOR PLAN 2
- TSH elevated at 19.43 with normal free T4 10.41, spoke with son on administration of LTX and was told that patient was taking medication after eating breakfast which may be cause of decreased absorption, however given worsening agitation may be contributing to psychiatric disease   - per [employer] last TSH also elevated ar 14-->12, per family no changes to LTX medications recently   - will continue with home LTX at this time, spoke with Endo attending Sr. Jackson no need for inpatient consultation, patient will need to have TFTs repeated in 4 weeks, especially since LTX was administered incorrectly (after food at home)   - continue with home dose of 125mcgs here   - TPO ab pending

## 2022-09-30 NOTE — PROGRESS NOTE ADULT - PROBLEM SELECTOR PLAN 6
DVT ppx: Lovenox  Diet: Pureed Diet   Dispo: pending  evaluation, Daughter Will and Son Wil, looking into SNF with memory care, CM/SW aware, PT recommending rehab

## 2022-09-30 NOTE — PHYSICAL THERAPY INITIAL EVALUATION ADULT - NSPTDISCHREC_GEN_A_CORE
Home no needs.  Pt operating at functional baseline. Pt would benefit from Inpatient rehabilitative services in order to improve ambulation limitations

## 2022-09-30 NOTE — PROGRESS NOTE ADULT - PROBLEM SELECTOR PLAN 5
Patient is DNR/DNI.   Spoke with daughter Vani at bedside. Daughter shared family has been overwhelmed with patient's care with increasing agitation despite trial of various medications outpatient. Family interested in patient going to rehab at this time as a transition pending long term care placement. Reviewed hospice philosophy of care/services. Discussed recommendation for patient to be evaluated for hospice services at long term care facility. Emotional support provided.  >16 minutes spent on goals of care discussion

## 2022-10-01 NOTE — PROGRESS NOTE ADULT - SUBJECTIVE AND OBJECTIVE BOX
PROGRESS NOTE:     Patient is a 89y old  Female who presents with a chief complaint of agitation (30 Sep 2022 14:24)      SUBJECTIVE / OVERNIGHT EVENTS: no acute event overnight. Reports having pain in her neck where she had surgery. Otherwise feeling fine, no other complaints.     ADDITIONAL REVIEW OF SYSTEMS:    MEDICATIONS  (STANDING):  aMIOdarone    Tablet 200 milliGRAM(s) Oral daily  amLODIPine   Tablet 2.5 milliGRAM(s) Oral daily  artificial tears (preservative free) Ophthalmic Solution 2 Drop(s) Both EYES four times a day  atorvastatin 20 milliGRAM(s) Oral at bedtime  enoxaparin Injectable 30 milliGRAM(s) SubCutaneous every 24 hours  levothyroxine 125 MICROGram(s) Oral daily  lisinopril 40 milliGRAM(s) Oral daily  multivitamin 1 Tablet(s) Oral daily  propranolol 40 milliGRAM(s) Oral two times a day  psyllium Powder 1 Packet(s) Oral two times a day  senna 2 Tablet(s) Oral at bedtime    MEDICATIONS  (PRN):  acetaminophen     Tablet .. 650 milliGRAM(s) Oral every 6 hours PRN Temp greater or equal to 38C (100.4F), Mild Pain (1 - 3)  aluminum hydroxide/magnesium hydroxide/simethicone Suspension 30 milliLiter(s) Oral every 4 hours PRN Dyspepsia  bisacodyl 5 milliGRAM(s) Oral every 12 hours PRN Constipation  LORazepam   Injectable 0.5 milliGRAM(s) IV Push every 4 hours PRN Agitation  melatonin 3 milliGRAM(s) Oral at bedtime PRN Insomnia  ondansetron Injectable 4 milliGRAM(s) IV Push every 8 hours PRN Nausea and/or Vomiting      CAPILLARY BLOOD GLUCOSE        I&O's Summary      PHYSICAL EXAM:  Vital Signs Last 24 Hrs  T(C): 36.9 (01 Oct 2022 17:00), Max: 36.9 (01 Oct 2022 17:00)  T(F): 98.4 (01 Oct 2022 17:00), Max: 98.4 (01 Oct 2022 17:00)  HR: 66 (01 Oct 2022 17:00) (56 - 72)  BP: 147/82 (01 Oct 2022 17:00) (146/69 - 159/69)  BP(mean): --  RR: 18 (01 Oct 2022 17:00) (17 - 18)  SpO2: 97% (01 Oct 2022 17:00) (96% - 98%)    Parameters below as of 01 Oct 2022 17:00  Patient On (Oxygen Delivery Method): room air      CONSTITUTIONAL: NAD, frail elderly female, laying in bed comfortably   EYES: PERRLA; conjunctiva and sclera clear   RESPIRATORY: Normal respiratory effort; lungs are clear to auscultation bilaterally  CARDIOVASCULAR: Regular rate and rhythm, normal S1 and S2, no murmur/rub/gallop; No lower extremity edema;   ABDOMEN: Nontender to palpation, normoactive bowel sounds, no rebound/guarding;   MUSCULOSKELETAL:   no clubbing or cyanosis of digits; no joint swelling or tenderness to palpation  PSYCH: calm, cooperative  NEUROLOGY: no gross sensory deficits     LABS:                        12.7   5.44  )-----------( 201      ( 01 Oct 2022 05:50 )             39.3     10-01    143  |  106  |  18  ----------------------------<  92  3.7   |  25  |  0.95    Ca    9.2      01 Oct 2022 05:50  Phos  3.2     10-01  Mg     2.20     10-01                Culture - Urine (collected 28 Sep 2022 19:55)  Source: Clean Catch Clean Catch (Midstream)  Final Report (29 Sep 2022 22:37):    <10,000 CFU/mL Normal Urogenital Umm        RADIOLOGY & ADDITIONAL TESTS:  Results Reviewed:   Imaging Personally Reviewed:  Electrocardiogram Personally Reviewed:    COORDINATION OF CARE:  Care Discussed with Consultants/Other Providers [Y/N]:  Prior or Outpatient Records Reviewed [Y/N]:

## 2022-10-02 NOTE — PROGRESS NOTE ADULT - PROBLEM SELECTOR PLAN 2
- TSH elevated at 19.43 with normal free T4 10.41, spoke with son on administration of LTX and was told that patient was taking medication after eating breakfast which may be cause of decreased absorption, however given worsening agitation may be contributing to psychiatric disease   - per [employer] last TSH also elevated ar 14-->12, per family no changes to LTX medications recently   - will continue with home LTX at this time, spoke with Endo attending Sr. Jackson no need for inpatient consultation, patient will need to have TFTs repeated in 4 weeks, especially since LTX was administered incorrectly (after food at home)   - continue with home dose of 125mcgs here   - TPO ab wnl

## 2022-10-02 NOTE — PROGRESS NOTE ADULT - PROBLEM SELECTOR PLAN 3
- QTC on outpt ekg 508, likely 2/2 risperidone use  - repeat EKG 10/1, manual correction QTc 510   - avoid further QTc prolonging meds, has ativan PRN for agitation, please attempt to redirect patient prior to administration  - per psych will consider start haldol 0.25mg PO once QTc <500

## 2022-10-02 NOTE — PROGRESS NOTE ADULT - PROBLEM SELECTOR PLAN 1
- Pt p/w worsening agitation, not well controlled with risperidone suspect likely 2/2 progression of dementia    - labs wnl, UA negative, RVP neg, VSS; no signs of active infection   - CTH negative for acute stroke or hemorrhage, notable for ischemic changes consistent with elderly age   - currently calm and cooperative

## 2022-10-02 NOTE — CHART NOTE - NSCHARTNOTEFT_GEN_A_CORE
Spoke with Cardiology fellow, Dr. Shaw, regarding manual calculation of QTc. EKG done on 10/1 shows NSR w/1st degree AV block @ 66 BPM and QTc of 553 w/LBBB. Manual calculation shows QTc to be around 510, so still prolonged. Will continue holding antipsychotics i/s/o prolonged QTc. Will continue to monitor QTc and follow up with psychiatry to adjust medications as needed.

## 2022-10-02 NOTE — PROGRESS NOTE ADULT - PROBLEM SELECTOR PLAN 6
DVT ppx: Lovenox  Diet: Pureed Diet. Will get bedside swallow test. If passed, can get bite size diet.  Dispo: pending  evaluation, Daughter Travis and Son Wil, looking into SNF with memory care, CM/SW aware, PT recommending rehab

## 2022-10-02 NOTE — PROGRESS NOTE ADULT - SUBJECTIVE AND OBJECTIVE BOX
PROGRESS NOTE:     Patient is a 89y old  Female who presents with a chief complaint of agitation (01 Oct 2022 16:52)      SUBJECTIVE / OVERNIGHT EVENTS: no acute event overnight. Reports feeling okay. Had BM. Home aid at bedside requesting a regular diet as patient normally eats a regular diet at home without issue. Denies fever, chills, n/v, abd pain.    ADDITIONAL REVIEW OF SYSTEMS:    MEDICATIONS  (STANDING):  aMIOdarone    Tablet 200 milliGRAM(s) Oral daily  amLODIPine   Tablet 2.5 milliGRAM(s) Oral daily  artificial tears (preservative free) Ophthalmic Solution 2 Drop(s) Both EYES four times a day  atorvastatin 20 milliGRAM(s) Oral at bedtime  enoxaparin Injectable 30 milliGRAM(s) SubCutaneous every 24 hours  levothyroxine 125 MICROGram(s) Oral daily  lisinopril 40 milliGRAM(s) Oral daily  multivitamin 1 Tablet(s) Oral daily  propranolol 40 milliGRAM(s) Oral two times a day  psyllium Powder 1 Packet(s) Oral two times a day  senna 2 Tablet(s) Oral at bedtime    MEDICATIONS  (PRN):  acetaminophen     Tablet .. 650 milliGRAM(s) Oral every 6 hours PRN Temp greater or equal to 38C (100.4F), Mild Pain (1 - 3)  aluminum hydroxide/magnesium hydroxide/simethicone Suspension 30 milliLiter(s) Oral every 4 hours PRN Dyspepsia  bisacodyl 5 milliGRAM(s) Oral every 12 hours PRN Constipation  LORazepam   Injectable 0.5 milliGRAM(s) IV Push every 4 hours PRN Agitation  melatonin 3 milliGRAM(s) Oral at bedtime PRN Insomnia  ondansetron Injectable 4 milliGRAM(s) IV Push every 8 hours PRN Nausea and/or Vomiting      CAPILLARY BLOOD GLUCOSE        I&O's Summary      PHYSICAL EXAM:  Vital Signs Last 24 Hrs  T(C): 36.8 (02 Oct 2022 05:24), Max: 36.9 (01 Oct 2022 17:00)  T(F): 98.2 (02 Oct 2022 05:24), Max: 98.4 (01 Oct 2022 17:00)  HR: 64 (02 Oct 2022 05:24) (63 - 72)  BP: 157/81 (02 Oct 2022 05:24) (147/82 - 159/69)  BP(mean): --  RR: 18 (02 Oct 2022 05:24) (18 - 18)  SpO2: 98% (02 Oct 2022 05:24) (95% - 98%)    Parameters below as of 02 Oct 2022 05:24  Patient On (Oxygen Delivery Method): room air        CONSTITUTIONAL: NAD, well-developed  RESPIRATORY: Normal respiratory effort; lungs are clear to auscultation bilaterally  CARDIOVASCULAR: Regular rate and rhythm, normal S1 and S2, no murmur/rub/gallop; No lower extremity edema  ABDOMEN: Nontender to palpation, normoactive bowel sounds, no rebound/guarding  MUSCLOSKELETAL: no clubbing or cyanosis of digits; no joint swelling or tenderness to palpation  SKIN: no new rash or lesion  PSYCH: A+O to person, place; affect appropriate    LABS:                        12.7   5.44  )-----------( 201      ( 01 Oct 2022 05:50 )             39.3     10-01    143  |  106  |  18  ----------------------------<  92  3.7   |  25  |  0.95    Ca    9.2      01 Oct 2022 05:50  Phos  3.2     10-01  Mg     2.20     10-01                  RADIOLOGY & ADDITIONAL TESTS:  Results Reviewed:   Imaging Personally Reviewed:  Electrocardiogram Personally Reviewed:    COORDINATION OF CARE:  Care Discussed with Consultants/Other Providers [Y/N]:  Prior or Outpatient Records Reviewed [Y/N]:

## 2022-10-03 NOTE — SWALLOW BEDSIDE ASSESSMENT ADULT - COMMENTS
Per Internal Medicine Note 10/3/22: "89 year old female with PMH of TIA, afib not on a/c, HTN, hypothyroidism, GERD, multiple falls with vertebral fractures, moderate to severe dementia presents with worsening agitation likely 2/2 progressive dementia, infectious workup negative. Likely dementia related agitation."     CXR 9/28/22: "Clear lungs."    Patient received upright in bed, asleep, though alerted via tactile and cues from son present at bedside. Patient and patient's son deny difficulty swallowing/symptoms of dysphagia. Patient able to follow 1- step commands and answer yes/no questions.

## 2022-10-03 NOTE — PROGRESS NOTE ADULT - SUBJECTIVE AND OBJECTIVE BOX
PROGRESS NOTE:     Patient is a 89y old  Female who presents with a chief complaint of agitation (02 Oct 2022 12:55)      SUBJECTIVE / OVERNIGHT EVENTS: Got ativan prn x1 this morning. Patient was sleepy when seen this morning. Reports abdominal pain but unable to elaborate on details. Eating okay. Denies n/v/d, fever. chills.    ADDITIONAL REVIEW OF SYSTEMS:    MEDICATIONS  (STANDING):  aMIOdarone    Tablet 200 milliGRAM(s) Oral daily  amLODIPine   Tablet 2.5 milliGRAM(s) Oral daily  artificial tears (preservative free) Ophthalmic Solution 2 Drop(s) Both EYES four times a day  atorvastatin 20 milliGRAM(s) Oral at bedtime  enoxaparin Injectable 30 milliGRAM(s) SubCutaneous every 24 hours  levothyroxine 125 MICROGram(s) Oral daily  lisinopril 40 milliGRAM(s) Oral daily  multivitamin 1 Tablet(s) Oral daily  propranolol 40 milliGRAM(s) Oral two times a day  psyllium Powder 1 Packet(s) Oral two times a day  senna 2 Tablet(s) Oral at bedtime    MEDICATIONS  (PRN):  acetaminophen     Tablet .. 650 milliGRAM(s) Oral every 6 hours PRN Temp greater or equal to 38C (100.4F), Mild Pain (1 - 3)  aluminum hydroxide/magnesium hydroxide/simethicone Suspension 30 milliLiter(s) Oral every 4 hours PRN Dyspepsia  bisacodyl 5 milliGRAM(s) Oral every 12 hours PRN Constipation  LORazepam   Injectable 0.5 milliGRAM(s) IV Push every 4 hours PRN Agitation  melatonin 3 milliGRAM(s) Oral at bedtime PRN Insomnia  ondansetron Injectable 4 milliGRAM(s) IV Push every 8 hours PRN Nausea and/or Vomiting      CAPILLARY BLOOD GLUCOSE        I&O's Summary      PHYSICAL EXAM:  Vital Signs Last 24 Hrs  T(C): 36.8 (03 Oct 2022 05:55), Max: 37.3 (02 Oct 2022 22:16)  T(F): 98.2 (03 Oct 2022 05:55), Max: 99.1 (02 Oct 2022 22:16)  HR: 62 (03 Oct 2022 05:55) (62 - 80)  BP: 151/71 (03 Oct 2022 05:55) (147/72 - 187/85)  BP(mean): 90 (02 Oct 2022 16:54) (90 - 112)  RR: 16 (03 Oct 2022 05:55) (16 - 19)  SpO2: 94% (03 Oct 2022 05:55) (94% - 97%)    Parameters below as of 03 Oct 2022 05:55  Patient On (Oxygen Delivery Method): room air        CONSTITUTIONAL: NAD, well-developed  RESPIRATORY: Normal respiratory effort; lungs are clear to auscultation bilaterally  CARDIOVASCULAR: Regular rate and rhythm, normal S1 and S2, no murmur/rub/gallop; No lower extremity edema  ABDOMEN: Nontender to palpation, normoactive bowel sounds, no rebound/guarding  MUSCLOSKELETAL: no clubbing or cyanosis of digits; no joint swelling or tenderness to palpation  SKIN: no new rash or lesion  PSYCH: A+O to person; affect appropriate    LABS:                        12.8   6.54  )-----------( 217      ( 03 Oct 2022 05:49 )             39.6     10-03    142  |  106  |  28<H>  ----------------------------<  104<H>  3.6   |  22  |  1.01    Ca    9.0      03 Oct 2022 05:49  Phos  3.4     10-03  Mg     2.00     10-03                  RADIOLOGY & ADDITIONAL TESTS:  Results Reviewed:   Imaging Personally Reviewed:  Electrocardiogram Personally Reviewed:    COORDINATION OF CARE:  Care Discussed with Consultants/Other Providers [Y/N]:  Prior or Outpatient Records Reviewed [Y/N]:

## 2022-10-03 NOTE — BH CONSULTATION LIAISON PROGRESS NOTE - NSBHASSESSMENTFT_PSY_ALL_CORE
Patient is a 89 year old female  with PMH of TIA, afib not on a/c, HTN, hypothyroidism, GERD, multiple falls with vertebral fractures, moderate to severe dementia presents with worsening agitation likely 2/2 progressive dementia, admitted to r/o organic causes. Patient comes from home, lives with 24/7 aid as well as daughter in same complex. Patient with no previous psychiatric hx prior to dementia. Has had medication trials given by her geriatrician (Seroquel, Remeron, and trazodone all which made patient agitation worse. Tried risperidone which helped for a bit but was inconsistent and patient still had outbreaks for agitation and yelling). This past week patient was uncontrollable, yelling and agitated prompting there ED visit. Patient mostly gets agitated in the evening but timing can be unpredictable. No aggression towards others. No harm to self. Discussed with the daughter that levothyroxine should be given on an empty stomach, separate from other medications. Patient is c/o headache.  TSH 19, , CT head- no acute changes, Folate elevated, B12 wnl, UA wnl.    10/3 - patient still agitated, difficult to redirect, per manual QTc correction from cardiology it is still prolonged, would begin depakote as below. Discussed risks/benefits with family of antipsychotics, depakote, aware of black box warning with antipsychotics etc. If Qtc is to be chronically prolonged then will discuss how this fits with GOC and qtc prolonging antipsychotics with family.     PLAN  - query for cardio re: QTc - any reversibility to patient's QTc prolongation?   - BEGIN Depakote 125mg qHS  - defer level of observation to primary team  - f/u [facility] T[facility], urine drug screen.  - due to complains of headache, please, assess the pain level and address her pain as appropriate.  the pain may contribute to her confusion.  - plan for dispo to [facility] vs. dementia unit

## 2022-10-03 NOTE — SWALLOW BEDSIDE ASSESSMENT ADULT - ASR SWALLOW RECOMMEND DIAG
objective testing is NOT indicated given functional oral and pharyngeal stage of swallow and clear chest imaging

## 2022-10-03 NOTE — SWALLOW BEDSIDE ASSESSMENT ADULT - SWALLOW EVAL: DIAGNOSIS
Patient presents with functional oral and pharyngeal stage of swallow given thin liquids and regular solids marked by adequate bolus containment, slowed bolus manipulation, slowed mastication with adequate anterior-posterior transport and adequate oral clearance. Pharyngeal stage marked by observed initiation of the pharyngeal swallow trigger judged via laryngeal palpation. No overt s/sx of impaired airway protection observed for all trials.

## 2022-10-04 NOTE — PROGRESS NOTE ADULT - PROBLEM SELECTOR PLAN 6
DVT ppx: Lovenox  Diet: Pureed Diet. Will get bedside swallow test. If passed, can get bite size diet.  Dispo: pending  evaluation, Daughter Travis and Son Wil, looking into SNF with memory care, CM/SW aware, PT recommending rehab DVT ppx: Lovenox  Diet: regular diet  Dispo: pending  evaluation, Daughter Will and Son Wil, looking into SNF with memory care, CM/SW aware, PT recommending rehab

## 2022-10-04 NOTE — PROGRESS NOTE ADULT - SUBJECTIVE AND OBJECTIVE BOX
PROGRESS NOTE:     Patient is a 89y old  Female who presents with a chief complaint of agitation (03 Oct 2022 13:18)      SUBJECTIVE / OVERNIGHT EVENTS:    ADDITIONAL REVIEW OF SYSTEMS:    MEDICATIONS  (STANDING):  aMIOdarone    Tablet 200 milliGRAM(s) Oral daily  amLODIPine   Tablet 2.5 milliGRAM(s) Oral daily  artificial tears (preservative free) Ophthalmic Solution 2 Drop(s) Both EYES four times a day  atorvastatin 20 milliGRAM(s) Oral at bedtime  diVALproex  milliGRAM(s) Oral at bedtime  enoxaparin Injectable 30 milliGRAM(s) SubCutaneous every 24 hours  levothyroxine 125 MICROGram(s) Oral daily  lisinopril 40 milliGRAM(s) Oral daily  multivitamin 1 Tablet(s) Oral daily  propranolol 40 milliGRAM(s) Oral two times a day  psyllium Powder 1 Packet(s) Oral two times a day  senna 2 Tablet(s) Oral at bedtime    MEDICATIONS  (PRN):  acetaminophen     Tablet .. 650 milliGRAM(s) Oral every 6 hours PRN Temp greater or equal to 38C (100.4F), Mild Pain (1 - 3)  aluminum hydroxide/magnesium hydroxide/simethicone Suspension 30 milliLiter(s) Oral every 4 hours PRN Dyspepsia  bisacodyl 5 milliGRAM(s) Oral every 12 hours PRN Constipation  LORazepam   Injectable 0.5 milliGRAM(s) IV Push every 4 hours PRN Agitation  melatonin 3 milliGRAM(s) Oral at bedtime PRN Insomnia  ondansetron Injectable 4 milliGRAM(s) IV Push every 8 hours PRN Nausea and/or Vomiting      CAPILLARY BLOOD GLUCOSE        I&O's Summary      PHYSICAL EXAM:  Vital Signs Last 24 Hrs  T(C): 36.4 (04 Oct 2022 05:15), Max: 36.9 (03 Oct 2022 13:38)  T(F): 97.5 (04 Oct 2022 05:15), Max: 98.5 (03 Oct 2022 17:40)  HR: 91 (04 Oct 2022 05:15) (60 - 91)  BP: 154/73 (04 Oct 2022 05:15) (142/61 - 162/82)  BP(mean): --  RR: 18 (04 Oct 2022 05:15) (18 - 18)  SpO2: 96% (04 Oct 2022 05:15) (95% - 97%)    Parameters below as of 04 Oct 2022 05:15  Patient On (Oxygen Delivery Method): room air        CONSTITUTIONAL: NAD, well-developed  RESPIRATORY: Normal respiratory effort; lungs are clear to auscultation bilaterally  CARDIOVASCULAR: Regular rate and rhythm, normal S1 and S2, no murmur/rub/gallop; No lower extremity edema; Peripheral pulses are 2+ bilaterally  ABDOMEN: Nontender to palpation, normoactive bowel sounds, no rebound/guarding; No hepatosplenomegaly  MUSCLOSKELETAL: no clubbing or cyanosis of digits; no joint swelling or tenderness to palpation  SKIN: no rash, erythema, lesion  PSYCH: A+O to person, place, and time; affect appropriate    LABS:                        12.8   6.54  )-----------( 217      ( 03 Oct 2022 05:49 )             39.6     10-03    142  |  106  |  28<H>  ----------------------------<  104<H>  3.6   |  22  |  1.01    Ca    9.0      03 Oct 2022 05:49  Phos  3.4     10-03  Mg     2.00     10-03                  RADIOLOGY & ADDITIONAL TESTS:  Results Reviewed:   Imaging Personally Reviewed:  Electrocardiogram Personally Reviewed:    COORDINATION OF CARE:  Care Discussed with Consultants/Other Providers [Y/N]:  Prior or Outpatient Records Reviewed [Y/N]:   PROGRESS NOTE:     Patient is a 89y old  Female who presents with a chief complaint of agitation (03 Oct 2022 13:18)      SUBJECTIVE / OVERNIGHT EVENTS: no acute event overnight. Reports having headache since yesterday (pt was agitated and screaming the night prior and got prn x1) and couldn't get a good night sleep last night. Denies blurry vision, chest pain, sob. Reports hx of migraine. Turning off the light helps.     ADDITIONAL REVIEW OF SYSTEMS:    MEDICATIONS  (STANDING):  aMIOdarone    Tablet 200 milliGRAM(s) Oral daily  amLODIPine   Tablet 2.5 milliGRAM(s) Oral daily  artificial tears (preservative free) Ophthalmic Solution 2 Drop(s) Both EYES four times a day  atorvastatin 20 milliGRAM(s) Oral at bedtime  diVALproex  milliGRAM(s) Oral at bedtime  enoxaparin Injectable 30 milliGRAM(s) SubCutaneous every 24 hours  levothyroxine 125 MICROGram(s) Oral daily  lisinopril 40 milliGRAM(s) Oral daily  multivitamin 1 Tablet(s) Oral daily  propranolol 40 milliGRAM(s) Oral two times a day  psyllium Powder 1 Packet(s) Oral two times a day  senna 2 Tablet(s) Oral at bedtime    MEDICATIONS  (PRN):  acetaminophen     Tablet .. 650 milliGRAM(s) Oral every 6 hours PRN Temp greater or equal to 38C (100.4F), Mild Pain (1 - 3)  aluminum hydroxide/magnesium hydroxide/simethicone Suspension 30 milliLiter(s) Oral every 4 hours PRN Dyspepsia  bisacodyl 5 milliGRAM(s) Oral every 12 hours PRN Constipation  LORazepam   Injectable 0.5 milliGRAM(s) IV Push every 4 hours PRN Agitation  melatonin 3 milliGRAM(s) Oral at bedtime PRN Insomnia  ondansetron Injectable 4 milliGRAM(s) IV Push every 8 hours PRN Nausea and/or Vomiting      CAPILLARY BLOOD GLUCOSE        I&O's Summary      PHYSICAL EXAM:  Vital Signs Last 24 Hrs  T(C): 36.4 (04 Oct 2022 05:15), Max: 36.9 (03 Oct 2022 13:38)  T(F): 97.5 (04 Oct 2022 05:15), Max: 98.5 (03 Oct 2022 17:40)  HR: 91 (04 Oct 2022 05:15) (60 - 91)  BP: 154/73 (04 Oct 2022 05:15) (142/61 - 162/82)  BP(mean): --  RR: 18 (04 Oct 2022 05:15) (18 - 18)  SpO2: 96% (04 Oct 2022 05:15) (95% - 97%)    Parameters below as of 04 Oct 2022 05:15  Patient On (Oxygen Delivery Method): room air        CONSTITUTIONAL: NAD, well-developed  RESPIRATORY: Normal respiratory effort; lungs are clear to auscultation bilaterally  CARDIOVASCULAR: Regular rate and rhythm, normal S1 and S2, no murmur/rub/gallop; No lower extremity edema  ABDOMEN: Nontender to palpation, normoactive bowel sounds, no rebound/guarding  MUSCLOSKELETAL: no clubbing or cyanosis of digits; no joint swelling or tenderness to palpation  SKIN: no new rash, erythema, lesion  PSYCH: A+O to person; affect appropriate    LABS:                        12.8   6.54  )-----------( 217      ( 03 Oct 2022 05:49 )             39.6     10-03    142  |  106  |  28<H>  ----------------------------<  104<H>  3.6   |  22  |  1.01    Ca    9.0      03 Oct 2022 05:49  Phos  3.4     10-03  Mg     2.00     10-03                  RADIOLOGY & ADDITIONAL TESTS:  Results Reviewed:   Imaging Personally Reviewed:  Electrocardiogram Personally Reviewed:    COORDINATION OF CARE:  Care Discussed with Consultants/Other Providers [Y/N]:  Prior or Outpatient Records Reviewed [Y/N]:

## 2022-10-04 NOTE — PROGRESS NOTE ADULT - PROBLEM SELECTOR PLAN 1
- Pt p/w worsening agitation, not well controlled with risperidone suspect likely 2/2 progression of dementia    - labs wnl, UA negative, RVP neg, VSS; no signs of active infection   - CTH negative for acute stroke or hemorrhage, notable for ischemic changes consistent with elderly age   - currently calm and cooperative - Pt p/w worsening agitation, not well controlled with risperidone suspect likely 2/2 progression of dementia    - labs wnl, UA negative, RVP neg, VSS; no signs of active infection   - CTH negative for acute stroke or hemorrhage, notable for ischemic changes consistent with elderly age   - currently calm and cooperative  - Headache likely 2/2 migraine in the setting of not sleep and agitation: moving all extremities, grossly intact sensation, speaking in clear sentences. Tylenol prn

## 2022-10-04 NOTE — PROGRESS NOTE ADULT - PROBLEM SELECTOR PLAN 3
- QTC on outpt ekg 508, likely 2/2 risperidone use  - repeat EKG 10/1, manual correction QTc 510   - avoid further QTc prolonging meds, has ativan PRN for agitation, please attempt to redirect patient prior to administration  - per psych will consider start haldol 0.25mg PO once QTc <500 - QTC on outpt ekg 508, likely 2/2 risperidone use  - repeat EKG 10/1, manual correction QTc 510   - avoid further QTc prolonging meds, has ativan PRN for agitation, please attempt to redirect patient prior to administration  - per psych will consider start haldol 0.25mg PO once QTc <500  - spoke with cardiology regarding if there is anything can be done to reverse/improve prolonged QTc, awaiting for recs

## 2022-10-05 NOTE — DIETITIAN INITIAL EVALUATION ADULT - PERTINENT MEDS FT
atorvastatin, levothyroxine, lisinopril, multivitamin, psyllium Powder, senna, aluminum hydroxide/magnesium hydroxide/simethicone Suspension PRN, bisacodyl PRN, ondansetron IV PRN

## 2022-10-05 NOTE — DIETITIAN INITIAL EVALUATION ADULT - ADD RECOMMEND
1) Recommend continue diet without therapeutic restrictions to maximize menu item availability. Diet texture/consistency per SLP recommendations/medical discretion.  2) RDN to provide Hormel Shake 1 PO 2x daily (provides 520 kcal, 22 gm protein per 8.45oz serving) to assist pt in meeting estimated nutrition needs.  3) Provide assistance at meal times, document % PO intake in flowsheets. RDN remains available to obtain food preferences PRN.  4) Monitor BMs, adjust bowel regimen as appropriate.  5) Obtain weekly weights.  6) Recommend continue micronutrient supplementation.

## 2022-10-05 NOTE — PROGRESS NOTE ADULT - SUBJECTIVE AND OBJECTIVE BOX
PROGRESS NOTE:     Patient is a 89y old  Female who presents with a chief complaint of Altered mental status     (05 Oct 2022 10:46)      SUBJECTIVE / OVERNIGHT EVENTS: was calling for family' name this morning. Per sitter, patient was trying to make BM and was agitated at that time. After she had BM, patient is calm and comfortable. Patient seen and examined in the morning. Reports that she wants to use bedpan but can't and hence frustrated. Denies headache, blurry vision, chest pain, sob.    ADDITIONAL REVIEW OF SYSTEMS:    MEDICATIONS  (STANDING):  aMIOdarone    Tablet 200 milliGRAM(s) Oral daily  amLODIPine   Tablet 2.5 milliGRAM(s) Oral daily  artificial tears (preservative free) Ophthalmic Solution 2 Drop(s) Both EYES four times a day  atorvastatin 20 milliGRAM(s) Oral at bedtime  diVALproex  milliGRAM(s) Oral at bedtime  enoxaparin Injectable 30 milliGRAM(s) SubCutaneous every 24 hours  levothyroxine 125 MICROGram(s) Oral daily  lisinopril 40 milliGRAM(s) Oral daily  multivitamin 1 Tablet(s) Oral daily  propranolol 40 milliGRAM(s) Oral two times a day  psyllium Powder 1 Packet(s) Oral two times a day  senna 2 Tablet(s) Oral at bedtime    MEDICATIONS  (PRN):  acetaminophen     Tablet .. 650 milliGRAM(s) Oral every 6 hours PRN Temp greater or equal to 38C (100.4F), Mild Pain (1 - 3)  aluminum hydroxide/magnesium hydroxide/simethicone Suspension 30 milliLiter(s) Oral every 4 hours PRN Dyspepsia  bisacodyl 5 milliGRAM(s) Oral every 12 hours PRN Constipation  LORazepam   Injectable 0.5 milliGRAM(s) IntraMuscular every 4 hours PRN Agitation  melatonin 3 milliGRAM(s) Oral at bedtime PRN Insomnia  ondansetron Injectable 4 milliGRAM(s) IV Push every 8 hours PRN Nausea and/or Vomiting      CAPILLARY BLOOD GLUCOSE        I&O's Summary      PHYSICAL EXAM:  Vital Signs Last 24 Hrs  T(C): 36.8 (05 Oct 2022 05:35), Max: 36.9 (04 Oct 2022 21:32)  T(F): 98.2 (05 Oct 2022 05:35), Max: 98.4 (04 Oct 2022 21:32)  HR: 61 (05 Oct 2022 13:00) (61 - 72)  BP: 155/74 (05 Oct 2022 13:00) (141/71 - 165/87)  BP(mean): 112 (05 Oct 2022 13:00) (112 - 112)  RR: 17 (05 Oct 2022 13:00) (16 - 18)  SpO2: 97% (05 Oct 2022 13:00) (96% - 98%)    Parameters below as of 05 Oct 2022 13:00  Patient On (Oxygen Delivery Method): room air        CONSTITUTIONAL: NAD, well-developed, sitting up in bed comfortable  RESPIRATORY: Normal respiratory effort; lungs are clear to auscultation bilaterally  CARDIOVASCULAR: Regular rate and rhythm, normal S1 and S2, no murmur/rub/gallop; No lower extremity edema  ABDOMEN: Nontender to palpation, normoactive bowel sounds, no rebound/guarding  MUSCLOSKELETAL: no clubbing or cyanosis of digits; no joint swelling or tenderness to palpation  SKIN: no rash, erythema, lesion  PSYCH: A+O to person, place; affect appropriate    LABS:                      RADIOLOGY & ADDITIONAL TESTS:  Results Reviewed:   Imaging Personally Reviewed:  Electrocardiogram Personally Reviewed:    COORDINATION OF CARE:  Care Discussed with Consultants/Other Providers [Y/N]:  Prior or Outpatient Records Reviewed [Y/N]:

## 2022-10-05 NOTE — PROGRESS NOTE ADULT - PROBLEM SELECTOR PLAN 6
DVT ppx: Lovenox  Diet: regular diet  Dispo: pending  evaluation, Daughter Will and Son Wil, looking into SNF with memory care, CM/SW aware, PT recommending rehab

## 2022-10-05 NOTE — BH CONSULTATION LIAISON PROGRESS NOTE - NSBHASSESSMENTFT_PSY_ALL_CORE
Patient is a 89 year old female  with PMH of TIA, afib not on a/c, HTN, hypothyroidism, GERD, multiple falls with vertebral fractures, moderate to severe dementia presents with worsening agitation likely 2/2 progressive dementia, admitted to r/o organic causes. Patient comes from home, lives with 24/7 aid as well as daughter in same complex. Patient with no previous psychiatric hx prior to dementia. Has had medication trials given by her geriatrician (Seroquel, Remeron, and trazodone all which made patient agitation worse. Tried risperidone which helped for a bit but was inconsistent and patient still had outbreaks for agitation and yelling). This past week patient was uncontrollable, yelling and agitated prompting there ED visit. Patient mostly gets agitated in the evening but timing can be unpredictable. No aggression towards others. No harm to self. Discussed with the daughter that levothyroxine should be given on an empty stomach, separate from other medications. Patient is c/o headache.  TSH 19, , CT head- no acute changes, Folate elevated, B12 wnl, UA wnl.    10/3 - patient still agitated, difficult to redirect, per manual QTc correction from cardiology it is still prolonged, would begin depakote as below. Discussed risks/benefits with family of antipsychotics, depakote, aware of black box warning with antipsychotics etc. If Qtc is to be chronically prolonged then will discuss how this fits with GOC and qtc prolonging antipsychotics with family.   10/5 - patient less agitated, per cardio low/no expecation of reversibility with qtc prolongation, would increase depakote as below, risks/benefits discussed with family    PLAN  - Increase Depakote to 250mg qHS (8pm)  - defer level of observation to primary team  - f/u Free T4, urine drug screen.  - due to complains of headache, please, assess the pain level and address her pain as appropriate.  the pain may contribute to her confusion.  - plan for dispo to [facility] vs. dementia unit

## 2022-10-05 NOTE — PROGRESS NOTE ADULT - PROBLEM SELECTOR PLAN 1
- Pt p/w worsening agitation, not well controlled with risperidone suspect likely 2/2 progression of dementia    - labs wnl, UA negative, RVP neg, VSS; no signs of active infection   - CTH negative for acute stroke or hemorrhage, notable for ischemic changes consistent with elderly age   - currently calm and cooperative  - Headache likely 2/2 migraine in the setting of not sleep and agitation: moving all extremities, grossly intact sensation, speaking in clear sentences. Tylenol prn. Now resolved.  - increase depakote to 250mg qhs

## 2022-10-05 NOTE — DIETITIAN INITIAL EVALUATION ADULT - OTHER CALCULATIONS
Dosing weight (9/28) 117.7 lbs. Recent chart weight (2/17) 130 lbs. Apparent ~12 lb (9%) weight loss x7 months.  Ideal Body Weight: 105 lbs / 47.7 kg +/-10%

## 2022-10-05 NOTE — DIETITIAN INITIAL EVALUATION ADULT - OTHER INFO
Per chart, pt is 89 year old female PMH TIA, Afib, HTN, hypothyroidism, GERD, multiple falls with vertebral fractures, dementia presenting with worsening agitation likely secondary to progressive dementia, infectious workup negative. Psych following. Discharge planning to group home vs [facility]. DNR/DNI.     Pt resting upright in chair at time of visit, unable to meaningfully participate in discussion secondary to cognitive impairment; comprehensive chart review completed. NKFA. Pt lives at home with family and has 24/7 HHA.     S/p bedside swallow assessment (10/13) recommending regular solids/thin liquids; in line with current diet order. Pt requires total assistance at meal times; noted with poor PO intake per flowsheet documentation. No noted GI distress, unknown last BM; ordered for senna 2 tablets qHS, psyllium powder 1 packet BID and bisacodyl 5 mg q12 hrs PRN.

## 2022-10-06 NOTE — PROGRESS NOTE ADULT - PROBLEM SELECTOR PLAN 1
- Pt p/w worsening agitation, not well controlled with risperidone suspect likely 2/2 progression of dementia    - labs wnl, UA negative, RVP neg, VSS; no signs of active infection   - CTH negative for acute stroke or hemorrhage, notable for ischemic changes consistent with elderly age   - currently calm and cooperative  - Headache likely 2/2 migraine in the setting of not sleep and agitation: moving all extremities, grossly intact sensation, speaking in clear sentences. Tylenol prn. Now resolved.  - c/w depakote to 250mg qhs

## 2022-10-06 NOTE — PROGRESS NOTE ADULT - PROBLEM SELECTOR PLAN 2
- TSH elevated at 19.43 with normal free T4 10.41, spoke with son on administration of LTX and was told that patient was taking medication after eating breakfast which may be cause of decreased absorption, however given worsening agitation may be contributing to psychiatric disease   - per [employer] last TSH also elevated ar 14-->12, per family no changes to LTX medications recently   - continue with home LTX at this time, spoke with Endo attending Sr. Jackson no need for inpatient consultation, patient will need to have TFTs repeated in 4 weeks, especially since LTX was administered incorrectly (after food at home)   - continue with home dose of 125mcgs here   - TPO ab wnl

## 2022-10-06 NOTE — PROGRESS NOTE ADULT - SUBJECTIVE AND OBJECTIVE BOX
PROGRESS NOTE:     Patient is a 89y old  Female who presents with a chief complaint of agitation (05 Oct 2022 16:38)      SUBJECTIVE / OVERNIGHT EVENTS: no acute event overnight. Reports abdominal discomfort because she wants to have a BM. Otherwise, denies other discomfort.    ADDITIONAL REVIEW OF SYSTEMS:    MEDICATIONS  (STANDING):  aMIOdarone    Tablet 200 milliGRAM(s) Oral daily  amLODIPine   Tablet 2.5 milliGRAM(s) Oral daily  artificial tears (preservative free) Ophthalmic Solution 2 Drop(s) Both EYES four times a day  atorvastatin 20 milliGRAM(s) Oral at bedtime  diVALproex  milliGRAM(s) Oral at bedtime  enoxaparin Injectable 30 milliGRAM(s) SubCutaneous every 24 hours  levothyroxine 125 MICROGram(s) Oral daily  lisinopril 40 milliGRAM(s) Oral daily  multivitamin 1 Tablet(s) Oral daily  propranolol 40 milliGRAM(s) Oral two times a day  psyllium Powder 1 Packet(s) Oral two times a day  senna 2 Tablet(s) Oral at bedtime    MEDICATIONS  (PRN):  acetaminophen     Tablet .. 650 milliGRAM(s) Oral every 6 hours PRN Temp greater or equal to 38C (100.4F), Mild Pain (1 - 3)  aluminum hydroxide/magnesium hydroxide/simethicone Suspension 30 milliLiter(s) Oral every 4 hours PRN Dyspepsia  bisacodyl 5 milliGRAM(s) Oral every 12 hours PRN Constipation  LORazepam   Injectable 0.5 milliGRAM(s) IntraMuscular every 4 hours PRN Agitation  melatonin 3 milliGRAM(s) Oral at bedtime PRN Insomnia  ondansetron Injectable 4 milliGRAM(s) IV Push every 8 hours PRN Nausea and/or Vomiting      CAPILLARY BLOOD GLUCOSE        I&O's Summary      PHYSICAL EXAM:  Vital Signs Last 24 Hrs  T(C): 36.6 (06 Oct 2022 13:12), Max: 36.9 (05 Oct 2022 21:35)  T(F): 97.8 (06 Oct 2022 13:12), Max: 98.5 (06 Oct 2022 05:40)  HR: 68 (06 Oct 2022 13:12) (61 - 68)  BP: 148/84 (06 Oct 2022 13:12) (123/63 - 165/81)  BP(mean): --  RR: 18 (06 Oct 2022 13:12) (17 - 18)  SpO2: 99% (06 Oct 2022 13:12) (97% - 99%)    Parameters below as of 06 Oct 2022 13:12  Patient On (Oxygen Delivery Method): room air        CONSTITUTIONAL: NAD, well-developed  RESPIRATORY: Normal respiratory effort; lungs are clear to auscultation bilaterally  CARDIOVASCULAR: Regular rate and rhythm, normal S1 and S2, no murmur/rub/gallop; No lower extremity edema  ABDOMEN: Nontender to palpation, normoactive bowel sounds, no rebound/guarding  MUSCLOSKELETAL: no clubbing or cyanosis of digits; no joint swelling or tenderness to palpation  SKIN: no new rash, erythema, lesion  PSYCH: A+O to person; affect appropriate    LABS:          RADIOLOGY & ADDITIONAL TESTS:  Results Reviewed:   Imaging Personally Reviewed:  Electrocardiogram Personally Reviewed:    COORDINATION OF CARE:  Care Discussed with Consultants/Other Providers [Y/N]:  Prior or Outpatient Records Reviewed [Y/N]:

## 2022-10-07 NOTE — BH CONSULTATION LIAISON PROGRESS NOTE - NSBHADMITCOUNSEL_PSY_A_CORE
risks and benefits of treatment options/instructions for management, treatment and follow up/importance of adherence to chosen treatment/risk factor reduction/client/family/caregiver education/prognosis

## 2022-10-07 NOTE — BH CONSULTATION LIAISON PROGRESS NOTE - NSBHPSYCHOLCOGABN_PSY_A_CORE
disoriented to time/disoriented to place/disoriented to situation

## 2022-10-07 NOTE — PROGRESS NOTE ADULT - PROBLEM SELECTOR PLAN 3
- QTC on outpt ekg 508, likely 2/2 risperidone use  - repeat EKG 10/1, manual correction QTc 510   - avoid further QTc prolonging meds, has ativan PRN for agitation, please attempt to redirect patient prior to administration  - repeat EKG today, Corrected QTc <500. Discussed with psych, given that she hasn't required prn overnight, would hold off starting haldol

## 2022-10-07 NOTE — PROGRESS NOTE ADULT - PROBLEM SELECTOR PROBLEM 5
- Patient filled Clonazepam 0.5 mg #60 on 4/27  - Was held secondary to encephalopathy  - Will resume slowly.   HTN (hypertension)

## 2022-10-07 NOTE — PROGRESS NOTE ADULT - PROBLEM SELECTOR PLAN 1
- Pt p/w worsening agitation, not well controlled with risperidone suspect likely 2/2 progression of dementia    - labs wnl, UA negative, RVP neg, VSS; no signs of active infection   - CTH negative for acute stroke or hemorrhage, notable for ischemic changes consistent with elderly age   - currently calm and cooperative (often yells for Romeo--patient's aid at home, when she needs to go to bathroom)  - Headache likely 2/2 migraine in the setting of not sleep well and agitation: moving all extremities, grossly intact sensation, speaking in clear sentences. Tylenol prn. Encourage water intake  - c/w depakote to 250mg qhs

## 2022-10-07 NOTE — PROGRESS NOTE ADULT - SUBJECTIVE AND OBJECTIVE BOX
PROGRESS NOTE:     Patient is a 89y old  Female who presents with a chief complaint of agitation (06 Oct 2022 16:04)      SUBJECTIVE / OVERNIGHT EVENTS: no acute event overnight. Per sitter, patient was screaming in the morning. After she had BM, she became calm. Reports headache this morning, denies vision changes, chest pain, dizziness. Reports not drinking much water.    ADDITIONAL REVIEW OF SYSTEMS:    MEDICATIONS  (STANDING):  aMIOdarone    Tablet 200 milliGRAM(s) Oral daily  amLODIPine   Tablet 2.5 milliGRAM(s) Oral daily  artificial tears (preservative free) Ophthalmic Solution 2 Drop(s) Both EYES four times a day  atorvastatin 20 milliGRAM(s) Oral at bedtime  diVALproex  milliGRAM(s) Oral at bedtime  enoxaparin Injectable 30 milliGRAM(s) SubCutaneous every 24 hours  levothyroxine 125 MICROGram(s) Oral daily  lisinopril 40 milliGRAM(s) Oral daily  multivitamin 1 Tablet(s) Oral daily  polyethylene glycol 3350 17 Gram(s) Oral two times a day  propranolol 40 milliGRAM(s) Oral two times a day  psyllium Powder 1 Packet(s) Oral two times a day  senna 2 Tablet(s) Oral at bedtime    MEDICATIONS  (PRN):  acetaminophen     Tablet .. 650 milliGRAM(s) Oral every 6 hours PRN Temp greater or equal to 38C (100.4F), Mild Pain (1 - 3)  aluminum hydroxide/magnesium hydroxide/simethicone Suspension 30 milliLiter(s) Oral every 4 hours PRN Dyspepsia  bisacodyl 5 milliGRAM(s) Oral every 12 hours PRN Constipation  LORazepam   Injectable 0.5 milliGRAM(s) IntraMuscular every 4 hours PRN Agitation  melatonin 3 milliGRAM(s) Oral at bedtime PRN Insomnia  ondansetron Injectable 4 milliGRAM(s) IV Push every 8 hours PRN Nausea and/or Vomiting      CAPILLARY BLOOD GLUCOSE        I&O's Summary      PHYSICAL EXAM:  Vital Signs Last 24 Hrs  T(C): 36.8 (07 Oct 2022 15:09), Max: 36.9 (07 Oct 2022 05:38)  T(F): 98.3 (07 Oct 2022 15:09), Max: 98.4 (07 Oct 2022 05:38)  HR: 67 (07 Oct 2022 15:09) (66 - 69)  BP: 142/76 (07 Oct 2022 05:38) (142/76 - 144/60)  BP(mean): --  RR: 18 (07 Oct 2022 15:09) (17 - 18)  SpO2: 98% (07 Oct 2022 15:09) (97% - 100%)    Parameters below as of 07 Oct 2022 15:09  Patient On (Oxygen Delivery Method): room air        CONSTITUTIONAL: NAD, well-developed  RESPIRATORY: Normal respiratory effort; lungs are clear to auscultation bilaterally  CARDIOVASCULAR: Regular rate and rhythm, normal S1 and S2, no murmur/rub/gallop; No lower extremity edema  ABDOMEN: Nontender to palpation, normoactive bowel sounds, no rebound/guarding  MUSCLOSKELETAL: no clubbing or cyanosis of digits; no joint swelling or tenderness to palpation  SKIN: no new rash, erythema, lesion  PSYCH: A+O to person, place; affect appropriate    LABS:                      RADIOLOGY & ADDITIONAL TESTS:  Results Reviewed:   Imaging Personally Reviewed:  Electrocardiogram Personally Reviewed: corrected QTc today <500    COORDINATION OF CARE:  Care Discussed with Consultants/Other Providers [Y/N]:  Prior or Outpatient Records Reviewed [Y/N]:

## 2022-10-07 NOTE — BH CONSULTATION LIAISON PROGRESS NOTE - NSBHADMITCOORDWITH_PSY_A_CORE
medical staff/social work/family/Caregiver

## 2022-10-07 NOTE — BH CONSULTATION LIAISON PROGRESS NOTE - NSBHASSESSMENTFT_PSY_ALL_CORE
Patient is a 89 year old female  with PMH of TIA, afib not on a/c, HTN, hypothyroidism, GERD, multiple falls with vertebral fractures, moderate to severe dementia presents with worsening agitation likely 2/2 progressive dementia, admitted to r/o organic causes. Patient comes from home, lives with 24/7 aid as well as daughter in same complex. Patient with no previous psychiatric hx prior to dementia. Has had medication trials given by her geriatrician (Seroquel, Remeron, and trazodone all which made patient agitation worse. Tried risperidone which helped for a bit but was inconsistent and patient still had outbreaks for agitation and yelling). This past week patient was uncontrollable, yelling and agitated prompting there ED visit. Patient mostly gets agitated in the evening but timing can be unpredictable. No aggression towards others. No harm to self. Discussed with the daughter that levothyroxine should be given on an empty stomach, separate from other medications. Patient is c/o headache.  TSH 19, , CT head- no acute changes, Folate elevated, B12 wnl, UA wnl.    10/3 - patient still agitated, difficult to redirect, per manual QTc correction from cardiology it is still prolonged, would begin depakote as below. Discussed risks/benefits with family of antipsychotics, depakote, aware of black box warning with antipsychotics etc. If Qtc is to be chronically prolonged then will discuss how this fits with GOC and qtc prolonging antipsychotics with family.   10/5 - patient less agitated, per cardio low/no expecation of reversibility with qtc prolongation, would increase depakote as below, risks/benefits discussed with family  10/7 - patient less agitated, tolerating depakote without issue, would benefit from VPA level trough after a few days but does not need ot be in hospital for this, discussed meds, risks/benefits, and dispo clearance with family (Wil)    PLAN  - Continue Depakote to 250mg qHS (8pm)  - If patient still hospitalized then would do VPA level serum trough (immediately prior to qHS dose) on 10/9  - defer level of observation to primary team  - f/u Free T4, urine drug screen.  - due to complains of headache, please, assess the pain level and address her pain as appropriate.  the pain may contribute to her confusion.  - plan for dispo to [facility] vs. dementia unit

## 2022-10-08 NOTE — PROGRESS NOTE ADULT - PROBLEM SELECTOR PLAN 3
- QTC on outpt ekg 508, likely 2/2 risperidone use  - repeat EKG 10/1, manual correction QTc 510   - avoid further QTc prolonging meds, has ativan PRN for agitation, please attempt to redirect patient prior to administration  - Corrected QTc <500. Continue to discuss with Psychiatry any changes to anti-psychotic regimen.

## 2022-10-08 NOTE — PROGRESS NOTE ADULT - SUBJECTIVE AND OBJECTIVE BOX
Patient is a 89y old  Female who presents with a chief complaint of agitation (07 Oct 2022 15:26)      SUBJECTIVE / OVERNIGHT EVENTS: No overnight event. Pt perseverates "yes" in response to all questions.    MEDICATIONS  (STANDING):  aMIOdarone    Tablet 200 milliGRAM(s) Oral daily  amLODIPine   Tablet 2.5 milliGRAM(s) Oral daily  artificial tears (preservative free) Ophthalmic Solution 2 Drop(s) Both EYES four times a day  atorvastatin 20 milliGRAM(s) Oral at bedtime  diVALproex  milliGRAM(s) Oral at bedtime  enoxaparin Injectable 30 milliGRAM(s) SubCutaneous every 24 hours  levothyroxine 125 MICROGram(s) Oral daily  lisinopril 40 milliGRAM(s) Oral daily  multivitamin 1 Tablet(s) Oral daily  polyethylene glycol 3350 17 Gram(s) Oral two times a day  propranolol 40 milliGRAM(s) Oral two times a day  psyllium Powder 1 Packet(s) Oral two times a day  senna 2 Tablet(s) Oral at bedtime    MEDICATIONS  (PRN):  acetaminophen     Tablet .. 650 milliGRAM(s) Oral every 6 hours PRN Temp greater or equal to 38C (100.4F), Mild Pain (1 - 3)  aluminum hydroxide/magnesium hydroxide/simethicone Suspension 30 milliLiter(s) Oral every 4 hours PRN Dyspepsia  bisacodyl 5 milliGRAM(s) Oral every 12 hours PRN Constipation  LORazepam   Injectable 0.5 milliGRAM(s) IntraMuscular every 4 hours PRN Agitation  melatonin 3 milliGRAM(s) Oral at bedtime PRN Insomnia  ondansetron Injectable 4 milliGRAM(s) IV Push every 8 hours PRN Nausea and/or Vomiting      CAPILLARY BLOOD GLUCOSE        I&O's Summary      PHYSICAL EXAM:  Vital Signs Last 24 Hrs  T(C): 36.8 (08 Oct 2022 05:36), Max: 36.8 (07 Oct 2022 15:09)  T(F): 98.2 (08 Oct 2022 05:36), Max: 98.3 (07 Oct 2022 15:09)  HR: 57 (08 Oct 2022 08:53) (57 - 67)  BP: 152/85 (08 Oct 2022 08:53) (152/85 - 175/99)  BP(mean): --  RR: 17 (08 Oct 2022 08:53) (17 - 18)  SpO2: 99% (08 Oct 2022 08:53) (96% - 99%)    Parameters below as of 08 Oct 2022 08:53  Patient On (Oxygen Delivery Method): room air      CONSTITUTIONAL: thin older woman NAD  EYES: PERRLA; conjunctiva and sclera clear  ENMT: Moist oral mucosa, no pharyngeal injection or exudates; normal dentition  NECK: Supple, no palpable masses; no thyromegaly  RESPIRATORY: Normal respiratory effort; lungs are clear to auscultation bilaterally  CARDIOVASCULAR: Regular rate and rhythm, normal S1 and S2, no murmur/rub/gallop; No lower extremity edema; Peripheral pulses are 2+ bilaterally  ABDOMEN: Nontender to palpation, normoactive bowel sounds, no rebound/guarding; No hepatosplenomegaly  MUSCULOSKELETAL:  no clubbing or cyanosis of digits; no joint swelling or tenderness to palpation  PSYCH: calm, responds to name with "yes"  NEUROLOGY: CN 2-12 are intact and symmetric; no gross sensory deficits   SKIN: No rashes; no palpable lesions    LABS:                      RADIOLOGY & ADDITIONAL TESTS:  Results Reviewed:   Imaging Personally Reviewed:  Electrocardiogram Personally Reviewed:    COORDINATION OF CARE:  Care Discussed with Consultants/Other Providers [Y/N]:  Prior or Outpatient Records Reviewed [Y/N]:

## 2022-10-09 NOTE — PROGRESS NOTE ADULT - PROBLEM SELECTOR PROBLEM 5
HTN (hypertension) Retention Suture Text: Retention sutures were placed to support the closure and prevent dehiscence.

## 2022-10-09 NOTE — PROGRESS NOTE ADULT - PROBLEM SELECTOR PLAN 1
- Pt p/w worsening agitation, not well controlled with risperidone suspect likely 2/2 progression of dementia    - labs wnl, UA negative, RVP neg, VSS; no signs of active infection   - CTH negative for acute stroke or hemorrhage, notable for ischemic changes consistent with elderly age   - currently calm and cooperative (often yells for Romeo--patient's aid at home, when she needs to go to bathroom)  - Headache likely 2/2 migraine in the setting of not sleep well and agitation: moving all extremities, grossly intact sensation, speaking in clear sentences. Tylenol prn. Encourage water intake  - depakote level is low; will discuss with Psychiatry whether to titrate considering mood and behavior are stable

## 2022-10-09 NOTE — PROGRESS NOTE ADULT - SUBJECTIVE AND OBJECTIVE BOX
Patient is a 89y old  Female who presents with a chief complaint of agitation (08 Oct 2022 10:47)      SUBJECTIVE / OVERNIGHT EVENTS: No overnight event. Pt denies complaint. Cannot elaborate on ROS.    MEDICATIONS  (STANDING):  aMIOdarone    Tablet 200 milliGRAM(s) Oral daily  amLODIPine   Tablet 2.5 milliGRAM(s) Oral daily  artificial tears (preservative free) Ophthalmic Solution 2 Drop(s) Both EYES four times a day  atorvastatin 20 milliGRAM(s) Oral at bedtime  diVALproex  milliGRAM(s) Oral at bedtime  enoxaparin Injectable 30 milliGRAM(s) SubCutaneous every 24 hours  levothyroxine 125 MICROGram(s) Oral daily  lisinopril 40 milliGRAM(s) Oral daily  multivitamin 1 Tablet(s) Oral daily  polyethylene glycol 3350 17 Gram(s) Oral two times a day  propranolol 40 milliGRAM(s) Oral two times a day  psyllium Powder 1 Packet(s) Oral two times a day  senna 2 Tablet(s) Oral at bedtime    MEDICATIONS  (PRN):  acetaminophen     Tablet .. 650 milliGRAM(s) Oral every 6 hours PRN Temp greater or equal to 38C (100.4F), Mild Pain (1 - 3)  aluminum hydroxide/magnesium hydroxide/simethicone Suspension 30 milliLiter(s) Oral every 4 hours PRN Dyspepsia  bisacodyl 5 milliGRAM(s) Oral every 12 hours PRN Constipation  LORazepam   Injectable 0.5 milliGRAM(s) IntraMuscular every 4 hours PRN Agitation  melatonin 3 milliGRAM(s) Oral at bedtime PRN Insomnia  ondansetron Injectable 4 milliGRAM(s) IV Push every 8 hours PRN Nausea and/or Vomiting      CAPILLARY BLOOD GLUCOSE        I&O's Summary    08 Oct 2022 07:01  -  09 Oct 2022 07:00  --------------------------------------------------------  IN: 0 mL / OUT: 820 mL / NET: -820 mL        PHYSICAL EXAM:  Vital Signs Last 24 Hrs  T(C): 36.6 (09 Oct 2022 06:10), Max: 36.8 (08 Oct 2022 20:35)  T(F): 97.9 (09 Oct 2022 06:10), Max: 98.2 (08 Oct 2022 20:35)  HR: 66 (09 Oct 2022 08:20) (57 - 66)  BP: 165/80 (09 Oct 2022 08:20) (110/69 - 165/80)  BP(mean): 108 (09 Oct 2022 06:10) (73 - 108)  RR: 17 (09 Oct 2022 06:10) (17 - 18)  SpO2: 94% (09 Oct 2022 06:10) (94% - 98%)    Parameters below as of 09 Oct 2022 06:10  Patient On (Oxygen Delivery Method): room air      CONSTITUTIONAL: thin older woman NAD  EYES: PERRLA; conjunctiva and sclera clear  ENMT: Moist oral mucosa, no pharyngeal injection or exudates; normal dentition  NECK: Supple, no palpable masses; no thyromegaly  RESPIRATORY: Normal respiratory effort; lungs are clear to auscultation bilaterally  CARDIOVASCULAR: Regular rate and rhythm, normal S1 and S2, no murmur/rub/gallop; No lower extremity edema; Peripheral pulses are 2+ bilaterally  ABDOMEN: Nontender to palpation, normoactive bowel sounds, no rebound/guarding; No hepatosplenomegaly  MUSCULOSKELETAL:  Normal gait; no clubbing or cyanosis of digits; no joint swelling or tenderness to palpation  PSYCH: calm, responds to name  NEUROLOGY: CN 2-12 are intact and symmetric; no gross sensory deficits   SKIN: No rashes; no palpable lesions    LABS:                      RADIOLOGY & ADDITIONAL TESTS:  Results Reviewed:   Imaging Personally Reviewed:  Electrocardiogram Personally Reviewed:    COORDINATION OF CARE:  Care Discussed with Consultants/Other Providers [Y/N]:  Prior or Outpatient Records Reviewed [Y/N]:

## 2022-10-09 NOTE — PROGRESS NOTE ADULT - PROBLEM SELECTOR PLAN 6
DVT ppx: Lovenox  Diet: regular diet  Dispo: pending  evaluation, Daughter Will and Son Wil, looking into SNF with memory care, CM/SW aware, PT recommending rehab. Time planning discharge 35 minutes.

## 2022-10-10 NOTE — PROVIDER CONTACT NOTE (OTHER) - ASSESSMENT
171/80 72 bpm patient asymptomatic and comfortable
Patient very combative refusing po intake
RN unable to perform EKG. Pt uncooperative, yelling and shaking

## 2022-10-10 NOTE — PROVIDER CONTACT NOTE (OTHER) - ACTION/TREATMENT ORDERED:
Provider made aware.
Endorse to day RN
Provider made aware. Manual BP requested per provider. Manual BP WNL

## 2022-10-10 NOTE — PROVIDER CONTACT NOTE (OTHER) - SITUATION
RN unable to perform EKG. Pt uncooperative, yelling and shaking
Patient hypertensive upon assessment
Patient refusing medication administration

## 2022-10-10 NOTE — PROGRESS NOTE ADULT - PROBLEM SELECTOR PLAN 1
p/w worsening agitation, not well controlled with risperidone suspect likely 2/2 progression of dementia    - labs wnl, UA negative, RVP neg, VSS; no signs of active infection   - CTH negative acute stroke/hemorrhage, notable for ischemic changes c/w elderly age   - currently calm and cooperative (often yells for Romeo--patient's aid at home, when she needs to go to bathroom)  - Had headache likely 2/2 migraine in the setting of not sleep well and agitation: moving all extremities, grossly intact sensation, speaking in clear sentences. Tylenol prn. Encourage water intake  - depakote level is low; will discuss with Psychiatry whether to titrate considering mood and behavior are stable --> refused hs - will change to 250 hs sprinkles, IV PRN

## 2022-10-10 NOTE — PROVIDER CONTACT NOTE (OTHER) - REASON
RN unable to perform EKG. Pt uncooperative, yelling and shaking
Patient refusing medication
Patient refusing medication

## 2022-10-10 NOTE — PROGRESS NOTE ADULT - SUBJECTIVE AND OBJECTIVE BOX
[facility] Division of Hospital Medicine  Zohra Gale MD  Pager (M-F, 8A-5P):  In-house pager [phone]; Long-range pager [phone]  Other Times:  Please page Hospitalist in Charge -  In-house pager [phone]    Patient is a 89y old  Female who presents with a chief complaint of agitation (09 Oct 2022 10:09)    SUBJECTIVE / OVERNIGHT EVENTS:  No problems reported over night. Pt seen resting in bed.   ADDITIONAL REVIEW OF SYSTEMS:    MEDICATIONS  (STANDING):  aMIOdarone    Tablet 200 milliGRAM(s) Oral daily  amLODIPine   Tablet 2.5 milliGRAM(s) Oral daily  artificial tears (preservative free) Ophthalmic Solution 2 Drop(s) Both EYES four times a day  atorvastatin 20 milliGRAM(s) Oral at bedtime  diVALproex  milliGRAM(s) Oral at bedtime  enoxaparin Injectable 30 milliGRAM(s) SubCutaneous every 24 hours  levothyroxine 125 MICROGram(s) Oral daily  lisinopril 40 milliGRAM(s) Oral daily  multivitamin 1 Tablet(s) Oral daily  polyethylene glycol 3350 17 Gram(s) Oral two times a day  propranolol 40 milliGRAM(s) Oral two times a day  psyllium Powder 1 Packet(s) Oral two times a day  senna 2 Tablet(s) Oral at bedtime    MEDICATIONS  (PRN):  acetaminophen     Tablet .. 650 milliGRAM(s) Oral every 6 hours PRN Temp greater or equal to 38C (100.4F), Mild Pain (1 - 3)  aluminum hydroxide/magnesium hydroxide/simethicone Suspension 30 milliLiter(s) Oral every 4 hours PRN Dyspepsia  bisacodyl 5 milliGRAM(s) Oral every 12 hours PRN Constipation  LORazepam   Injectable 0.5 milliGRAM(s) IntraMuscular every 4 hours PRN Agitation  melatonin 3 milliGRAM(s) Oral at bedtime PRN Insomnia  ondansetron Injectable 4 milliGRAM(s) IV Push every 8 hours PRN Nausea and/or Vomiting    PHYSICAL EXAM:  Vital Signs Last 24 Hrs  T(C): 36.6 (10 Oct 2022 05:50), Max: 36.9 (09 Oct 2022 22:18)  T(F): 97.8 (10 Oct 2022 05:50), Max: 98.4 (09 Oct 2022 22:18)  HR: 67 (09 Oct 2022 22:18) (67 - 75)  BP: 145/68 (10 Oct 2022 05:50) (139/64 - 155/83)  BP(mean): --  RR: 17 (10 Oct 2022 06:00) (17 - 18)  SpO2: 97% (10 Oct 2022 06:00) (95% - 97%)    Parameters below as of 10 Oct 2022 06:00  Patient On (Oxygen Delivery Method): room air    CONSTITUTIONAL: thin elderly woman NAD  RESPIRATORY: Normal respiratory effort; lungs are clear to auscultation bilaterally  CARDIOVASCULAR: Regular rate and rhythm, normal S1 and S2, no murmur/rub/gallop; No lower extremity edema; Peripheral pulses are 2+ bilaterally  ABDOMEN: Nontender to palpation, normoactive bowel sounds, no rebound/guarding  MUSCULOSKELETAL: no clubbing or cyanosis of digits; no joint swelling or tenderness to palpation  PSYCH: calm, oriented self, 1923  NEUROLOGY: nonfocal  SKIN: No rashes; no palpable lesions    LABS:    RADIOLOGY & ADDITIONAL TESTS:  Results Reviewed:   Imaging Personally Reviewed:  Electrocardiogram Personally Reviewed:    COORDINATION OF CARE:  Care Discussed with Consultants/Other Providers [Y/N]: RN, SW, CM, ACP re overall care   Prior or Outpatient Records Reviewed [Y/N]:   [facility] Division of Hospital Medicine  Zohra Gale MD  Pager (M-F, 8A-5P):  In-house pager [phone]; Long-range pager [phone]  Other Times:  Please page Hospitalist in Charge -  In-house pager [phone]    Patient is a 89y old  Female who presents with a chief complaint of agitation (09 Oct 2022 10:09)    SUBJECTIVE / OVERNIGHT EVENTS:  RN over night reports pt refusing meds, combative; did not take depakote. Pt seen resting in bed, am care being provided by PCAs. Pt calm, cooperative. Oriented self, 1923. Denies pain, SOB, cough.  ADDITIONAL REVIEW OF SYSTEMS:    MEDICATIONS  (STANDING):  aMIOdarone    Tablet 200 milliGRAM(s) Oral daily  amLODIPine   Tablet 2.5 milliGRAM(s) Oral daily  artificial tears (preservative free) Ophthalmic Solution 2 Drop(s) Both EYES four times a day  atorvastatin 20 milliGRAM(s) Oral at bedtime  diVALproex  milliGRAM(s) Oral at bedtime  enoxaparin Injectable 30 milliGRAM(s) SubCutaneous every 24 hours  levothyroxine 125 MICROGram(s) Oral daily  lisinopril 40 milliGRAM(s) Oral daily  multivitamin 1 Tablet(s) Oral daily  polyethylene glycol 3350 17 Gram(s) Oral two times a day  propranolol 40 milliGRAM(s) Oral two times a day  psyllium Powder 1 Packet(s) Oral two times a day  senna 2 Tablet(s) Oral at bedtime    MEDICATIONS  (PRN):  acetaminophen     Tablet .. 650 milliGRAM(s) Oral every 6 hours PRN Temp greater or equal to 38C (100.4F), Mild Pain (1 - 3)  aluminum hydroxide/magnesium hydroxide/simethicone Suspension 30 milliLiter(s) Oral every 4 hours PRN Dyspepsia  bisacodyl 5 milliGRAM(s) Oral every 12 hours PRN Constipation  LORazepam   Injectable 0.5 milliGRAM(s) IntraMuscular every 4 hours PRN Agitation  melatonin 3 milliGRAM(s) Oral at bedtime PRN Insomnia  ondansetron Injectable 4 milliGRAM(s) IV Push every 8 hours PRN Nausea and/or Vomiting    PHYSICAL EXAM:  Vital Signs Last 24 Hrs  T(C): 36.6 (10 Oct 2022 05:50), Max: 36.9 (09 Oct 2022 22:18)  T(F): 97.8 (10 Oct 2022 05:50), Max: 98.4 (09 Oct 2022 22:18)  HR: 67 (09 Oct 2022 22:18) (67 - 75)  BP: 145/68 (10 Oct 2022 05:50) (139/64 - 155/83)  BP(mean): --  RR: 17 (10 Oct 2022 06:00) (17 - 18)  SpO2: 97% (10 Oct 2022 06:00) (95% - 97%)    Parameters below as of 10 Oct 2022 06:00  Patient On (Oxygen Delivery Method): room air    CONSTITUTIONAL: thin elderly woman NAD  RESPIRATORY: Normal respiratory effort; lungs are clear to auscultation bilaterally  CARDIOVASCULAR: Regular rate and rhythm, normal S1 and S2, no murmur/rub/gallop; No lower extremity edema; Peripheral pulses are 2+ bilaterally  ABDOMEN: Nontender to palpation, normoactive bowel sounds, no rebound/guarding  MUSCULOSKELETAL: no clubbing or cyanosis of digits; no joint swelling or tenderness to palpation  PSYCH: calm, oriented self, 1923  NEUROLOGY: nonfocal  SKIN: No rashes; no palpable lesions    LABS:    RADIOLOGY & ADDITIONAL TESTS:  Results Reviewed:   Imaging Personally Reviewed:  Electrocardiogram Personally Reviewed:    COORDINATION OF CARE:  Care Discussed with Consultants/Other Providers [Y/N]: RN, SW, CM, ACP re overall care   Prior or Outpatient Records Reviewed [Y/N]:

## 2022-10-10 NOTE — PROGRESS NOTE ADULT - PROBLEM SELECTOR PLAN 4
- QTC on outpt ekg 508, likely 2/2 risperidone use  - repeat EKG 10/1, manual correction QTc 510   - avoid further QTc prolonging meds, has ativan PRN for agitation, please attempt to redirect patient prior to administration  - Corrected QTc <500.

## 2022-10-10 NOTE — PROGRESS NOTE ADULT - PROBLEM SELECTOR PLAN 3
- TSH elevated at 19.43 with normal free T4 10.41, spoke with son on administration of LTX and was told that patient was taking medication after eating breakfast which may be cause of decreased absorption, however given worsening agitation may be contributing to psychiatric disease   - per [employer] last TSH also elevated ar 14-->12, per family no changes to LTX medications recently   - continue with home LTX at this time, spoke with Endo attending Sr. Jackson no need for inpatient consultation, patient will need to have TFTs repeated in 4 weeks, especially since LTX was administered incorrectly (after food at home)   - continue with home dose of 125mcgs here (at least one hour before food)  - TPO ab wnl  repeat TSH/FT4...

## 2022-10-11 NOTE — PROGRESS NOTE ADULT - PROBLEM SELECTOR PLAN 3
- TSH elevated at 19.43 with normal free T4 10.41, spoke with son on administration of LTX and was told that patient was taking medication after eating breakfast which may be cause of decreased absorption, however given worsening agitation may be contributing to psychiatric disease   - per [employer] last TSH also elevated ar 14-->12, per family no changes to LTX medications recently   - continue with home LTX at this time, spoke with Endo attending Sr. Jackson no need for inpatient consultation, patient will need to have TFTs repeated in 4 weeks, especially since LTX was administered incorrectly (after food at home)   - continue with home dose of 125mcgs here (at least one hour before food)  - TPO ab wnl  repeat TSH/FT4... - TSH elevated at 19.43 with normal free T4 10.41, spoke with son on administration of LTX and was told that patient was taking medication after eating breakfast which may be cause of decreased absorption, however given worsening agitation may be contributing to psychiatric disease   - per [employer] last TSH also elevated ar 14-->12, per family no changes to LTX medications recently   - continue with home LTX at this time, spoke with Endo attending Sr. Jackson no need for inpatient consultation, patient will need to have TFTs repeated in 4 weeks, especially since LTX was administered incorrectly (after food at home)   - continue with home dose of 125mcgs here (at least one hour before food)  - TPO ab wnl  TSH 19 --> 6, f/u FT4

## 2022-10-11 NOTE — PROGRESS NOTE ADULT - PROBLEM SELECTOR PLAN 1
p/w worsening agitation, not well controlled with risperidone suspect likely 2/2 progression of dementia    - labs wnl, UA negative, RVP neg, VSS; no signs of active infection   - CTH negative acute stroke/hemorrhage, notable for ischemic changes c/w elderly age   - currently calm and cooperative (often yells for Romeo--patient's aid at home, when she needs to go to bathroom)  - Had headache likely 2/2 migraine in the setting of not sleep well and agitation: moving all extremities, grossly intact sensation, speaking in clear sentences. Tylenol prn. Encourage water intake  - depakote level is low; will discuss with Psychiatry whether to titrate considering mood and behavior are stable --> refused hs - will change to 250 hs sprinkles, IV PRN p/w worsening agitation, not well controlled with risperidone suspect likely 2/2 progression of dementia    - labs wnl, UA negative, RVP neg, VSS; no signs of active infection   - CTH negative acute stroke/hemorrhage, notable for ischemic changes c/w elderly age   - currently calm and cooperative (often yells for Romeo--patient's aid at home, when she needs to go to bathroom)  - Had headache likely 2/2 migraine in the setting of not sleep well and agitation: moving all extremities, grossly intact sensation, speaking in clear sentences. Tylenol prn. Encourage water intake  - c/w depakote 250 hs - daughter would like psych to reeval as she feels pt bit more confused on depakote, discussed need to find balance

## 2022-10-11 NOTE — PROGRESS NOTE ADULT - PROBLEM SELECTOR PLAN 4
- QTC on outpt ekg 508, likely 2/2 risperidone use  - repeat EKG 10/1, manual correction QTc 510   - avoid further QTc prolonging meds, has ativan PRN for agitation, please attempt to redirect patient prior to administration  - Corrected QTc <500. - QTC on outpt ekg 508, likely 2/2 risperidone use  - repeat EKG 10/1, manual correction QTc 510   - avoid further QTc prolonging meds, has ativan PRN for agitation, attempt to redirect patient prior to administration  - Corrected QTc <500.

## 2022-10-11 NOTE — PROGRESS NOTE ADULT - PROBLEM SELECTOR PLAN 2
10/9 COVID exposure, son aware  monitor for symptoms, recheck labs 10/9 COVID exposure, remains asymptomatic  monitoring for symptoms, labs stable

## 2022-10-11 NOTE — PROGRESS NOTE ADULT - SUBJECTIVE AND OBJECTIVE BOX
[facility] Division of Hospital Medicine  Zohra Gale MD  Pager (M-F, 8A-5P):  In-house pager [phone]; Long-range pager [phone]  Other Times:  Please page Hospitalist in Charge -  In-house pager [phone]    Patient is a 89y old  Female who presents with a chief complaint of agitation (10 Oct 2022 11:21)    SUBJECTIVE / OVERNIGHT EVENTS:  ...  ADDITIONAL REVIEW OF SYSTEMS:    MEDICATIONS  (STANDING):  aMIOdarone    Tablet 200 milliGRAM(s) Oral daily  amLODIPine   Tablet 2.5 milliGRAM(s) Oral daily  artificial tears (preservative free) Ophthalmic Solution 2 Drop(s) Both EYES four times a day  atorvastatin 20 milliGRAM(s) Oral at bedtime  diVALproex Sprinkle 250 milliGRAM(s) Oral at bedtime  enoxaparin Injectable 30 milliGRAM(s) SubCutaneous every 24 hours  levothyroxine 125 MICROGram(s) Oral daily  lisinopril 40 milliGRAM(s) Oral daily  multivitamin 1 Tablet(s) Oral daily  polyethylene glycol 3350 17 Gram(s) Oral two times a day  propranolol 40 milliGRAM(s) Oral two times a day  psyllium Powder 1 Packet(s) Oral two times a day  senna 2 Tablet(s) Oral at bedtime    MEDICATIONS  (PRN):  acetaminophen     Tablet .. 650 milliGRAM(s) Oral every 6 hours PRN Temp greater or equal to 38C (100.4F), Mild Pain (1 - 3)  aluminum hydroxide/magnesium hydroxide/simethicone Suspension 30 milliLiter(s) Oral every 4 hours PRN Dyspepsia  bisacodyl 5 milliGRAM(s) Oral every 12 hours PRN Constipation  LORazepam   Injectable 0.5 milliGRAM(s) IntraMuscular every 4 hours PRN Agitation  melatonin 3 milliGRAM(s) Oral at bedtime PRN Insomnia  ondansetron Injectable 4 milliGRAM(s) IV Push every 8 hours PRN Nausea and/or Vomiting    PHYSICAL EXAM:  Vital Signs Last 24 Hrs  T(C): 36.6 (11 Oct 2022 05:20), Max: 36.8 (10 Oct 2022 11:22)  T(F): 97.9 (11 Oct 2022 05:20), Max: 98.2 (10 Oct 2022 11:22)  HR: 66 (11 Oct 2022 05:20) (66 - 92)  BP: 149/79 (11 Oct 2022 05:20) (136/53 - 171/80)  BP(mean): --  RR: 17 (11 Oct 2022 05:20) (17 - 18)  SpO2: 99% (11 Oct 2022 05:20) (98% - 100%)    Parameters below as of 11 Oct 2022 05:20  Patient On (Oxygen Delivery Method): room air    CONSTITUTIONAL: thin elderly woman NAD  RESPIRATORY: Normal respiratory effort; lungs are clear to auscultation bilaterally  CARDIOVASCULAR: Regular rate and rhythm, normal S1 and S2, no murmur/rub/gallop; No lower extremity edema; Peripheral pulses are 2+ bilaterally  ABDOMEN: Nontender to palpation, normoactive bowel sounds, no rebound/guarding  MUSCULOSKELETAL: no clubbing or cyanosis of digits; no joint swelling or tenderness to palpation  PSYCH: calm, oriented self, 1923  NEUROLOGY: nonfocal  SKIN: No rashes; no palpable lesions    LABS:                        12.5   6.37  )-----------( 235      ( 10 Oct 2022 12:18 )             39.1     10-10    144  |  105  |  26<H>  ----------------------------<  111<H>  4.3   |  27  |  1.19    Ca    9.2      10 Oct 2022 12:18    TPro  7.3  /  Alb  3.9  /  TBili  0.3  /  DBili  x   /  AST  26  /  ALT  15  /  AlkPhos  65  10-10    RADIOLOGY & ADDITIONAL TESTS:  Results Reviewed:   Imaging Personally Reviewed:  Electrocardiogram Personally Reviewed:    COORDINATION OF CARE:  Care Discussed with Consultants/Other Providers [Y/N]: RN, SW, CM, ACP re overall care   Prior or Outpatient Records Reviewed [Y/N]:   [facility] Division of Hospital Medicine  Zohra Gale MD  Pager (M-F, 8A-5P):  In-house pager [phone]; Long-range pager [phone]  Other Times:  Please page Hospitalist in Charge -  In-house pager [phone]    Patient is a 89y old  Female who presents with a chief complaint of agitation (10 Oct 2022 11:21)    SUBJECTIVE / OVERNIGHT EVENTS:  No problems reported over night. Team reports pt calm, only bit restless when soiled and needs to be cleaned up. Pt seen earlier, smiling, resting calmly. Only c/o room being cold, no other complaints. Seen later with daughter at bedside, at first could remember who daughter was but did on subsequent questioning.  ADDITIONAL REVIEW OF SYSTEMS:    MEDICATIONS  (STANDING):  aMIOdarone    Tablet 200 milliGRAM(s) Oral daily  amLODIPine   Tablet 2.5 milliGRAM(s) Oral daily  artificial tears (preservative free) Ophthalmic Solution 2 Drop(s) Both EYES four times a day  atorvastatin 20 milliGRAM(s) Oral at bedtime  diVALproex Sprinkle 250 milliGRAM(s) Oral at bedtime  enoxaparin Injectable 30 milliGRAM(s) SubCutaneous every 24 hours  levothyroxine 125 MICROGram(s) Oral daily  lisinopril 40 milliGRAM(s) Oral daily  multivitamin 1 Tablet(s) Oral daily  polyethylene glycol 3350 17 Gram(s) Oral two times a day  propranolol 40 milliGRAM(s) Oral two times a day  psyllium Powder 1 Packet(s) Oral two times a day  senna 2 Tablet(s) Oral at bedtime    MEDICATIONS  (PRN):  acetaminophen     Tablet .. 650 milliGRAM(s) Oral every 6 hours PRN Temp greater or equal to 38C (100.4F), Mild Pain (1 - 3)  aluminum hydroxide/magnesium hydroxide/simethicone Suspension 30 milliLiter(s) Oral every 4 hours PRN Dyspepsia  bisacodyl 5 milliGRAM(s) Oral every 12 hours PRN Constipation  LORazepam   Injectable 0.5 milliGRAM(s) IntraMuscular every 4 hours PRN Agitation  melatonin 3 milliGRAM(s) Oral at bedtime PRN Insomnia  ondansetron Injectable 4 milliGRAM(s) IV Push every 8 hours PRN Nausea and/or Vomiting    PHYSICAL EXAM:  Vital Signs Last 24 Hrs  T(C): 36.6 (11 Oct 2022 05:20), Max: 36.8 (10 Oct 2022 11:22)  T(F): 97.9 (11 Oct 2022 05:20), Max: 98.2 (10 Oct 2022 11:22)  HR: 66 (11 Oct 2022 05:20) (66 - 92)  BP: 149/79 (11 Oct 2022 05:20) (136/53 - 171/80)  BP(mean): --  RR: 17 (11 Oct 2022 05:20) (17 - 18)  SpO2: 99% (11 Oct 2022 05:20) (98% - 100%)    Parameters below as of 11 Oct 2022 05:20  Patient On (Oxygen Delivery Method): room air    CONSTITUTIONAL: thin elderly woman NAD  RESPIRATORY: Normal respiratory effort; lungs are clear to auscultation bilaterally  CARDIOVASCULAR: Regular rate and rhythm, normal S1 and S2, no murmur/rub/gallop; No lower extremity edema; Peripheral pulses are 2+ bilaterally  ABDOMEN: Nontender to palpation, normoactive bowel sounds, no rebound/guarding  MUSCULOSKELETAL: no clubbing or cyanosis of digits; no joint swelling or tenderness to palpation  PSYCH: calm, oriented self, 1923  NEUROLOGY: nonfocal  SKIN: No rashes; no palpable lesions    LABS:                        12.5   6.37  )-----------( 235      ( 10 Oct 2022 12:18 )             39.1     10-10    144  |  105  |  26<H>  ----------------------------<  111<H>  4.3   |  27  |  1.19    Ca    9.2      10 Oct 2022 12:18    TPro  7.3  /  Alb  3.9  /  TBili  0.3  /  DBili  x   /  AST  26  /  ALT  15  /  AlkPhos  65  10-10    RADIOLOGY & ADDITIONAL TESTS:  Results Reviewed:   Imaging Personally Reviewed:  Electrocardiogram Personally Reviewed:    COORDINATION OF CARE:  Care Discussed with Consultants/Other Providers [Y/N]: RN, SW, CM, ACP re overall care   Prior or Outpatient Records Reviewed [Y/N]:

## 2022-10-11 NOTE — PROGRESS NOTE ADULT - PROBLEM SELECTOR PLAN 7
DVT ppx: Lovenox  Diet: regular diet  Dispo: pending  evaluation, Daughter Will and Son Wil, looking into SNF with memory care, CM/SW aware, PT recommending rehab DVT ppx: Lovenox  Diet: regular diet  Dispo: family hoping for long term, coordinating with SW

## 2022-10-11 NOTE — PROGRESS NOTE ADULT - NSPROGADDITIONALINFOA_GEN_ALL_CORE
10/10 d/w wan avila overall care, including COVID exposure 10/10 d/w son Wil re overall care, including COVID exposure  10/11 d/w daughter Vani at bedside

## 2022-10-12 NOTE — DISCHARGE NOTE PROVIDER - NSDCCPCAREPLAN_GEN_ALL_CORE_FT
PRINCIPAL DISCHARGE DIAGNOSIS  Diagnosis: AMS (altered mental status)  Assessment and Plan of Treatment: You were admitted to the hospital with confusion/change in behavior. The CT scan of your head did NOT show a bleed, stroke, or other emergent findings. Such change was likely from worsening dementia.  Upon discharge please continue your prescribed medications and follow-up with your primary care provider as outpatient for ongoing care. If you or your family members notice a state of confusion, slurred speech, change in behavior, or seizure-like activity you should return to the emergency room.      SECONDARY DISCHARGE DIAGNOSES  Diagnosis: Agitation due to dementia  Assessment and Plan of Treatment: Please continue your medications as prescribed and allow help with daily acitivities of living. Maintain a safe environment and make movements in a careful manner to prevent falls. Ensure that you are eating and drinking adequately and maintaining a healthy sleep cycle.   If you are in need of assistance with medication adjustment you can follow-up with your outpatient provider or refer to the [facility]iat[facility] clinic by calling [phone].    Diagnosis: Hypothyroidism  Assessment and Plan of Treatment: Continue your thyroid medications as recommended and follow-up with your outpatient provider for continual thyroid function testing within 4-6 weeks for routine labs.    Diagnosis: Chronic atrial fibrillation  Assessment and Plan of Treatment: Please continue your medications as directed and follow-up with your primary provider/cardiologist to further manage your care.   Monitor for signs/symptoms of uncontrolled atrial fibrillation, such as, increased heart rate, palpitations, chest pain, dizziness, or shortness of breath - Return to emergency room if these signs/symptoms are present.

## 2022-10-12 NOTE — BH CONSULTATION LIAISON PROGRESS NOTE - NSBHCHARTREVIEWVS_PSY_A_CORE FT
Vital Signs Last 24 Hrs  T(C): 37.2 (12 Oct 2022 11:05), Max: 37.2 (12 Oct 2022 05:15)  T(F): 99 (12 Oct 2022 11:05), Max: 99 (12 Oct 2022 05:15)  HR: 65 (12 Oct 2022 11:05) (65 - 78)  BP: 110/61 (12 Oct 2022 11:05) (110/61 - 159/80)  BP(mean): 96 (12 Oct 2022 05:15) (96 - 97)  RR: 18 (12 Oct 2022 11:05) (16 - 18)  SpO2: 99% (12 Oct 2022 11:05) (97% - 99%)    Parameters below as of 12 Oct 2022 11:05  Patient On (Oxygen Delivery Method): room air    
Vital Signs Last 24 Hrs  T(C): 36.8 (05 Oct 2022 05:35), Max: 37.2 (04 Oct 2022 14:40)  T(F): 98.2 (05 Oct 2022 05:35), Max: 98.9 (04 Oct 2022 14:40)  HR: 63 (05 Oct 2022 05:35) (62 - 72)  BP: 144/64 (05 Oct 2022 05:35) (141/71 - 165/87)  BP(mean): --  RR: 18 (05 Oct 2022 05:35) (15 - 18)  SpO2: 98% (05 Oct 2022 05:35) (96% - 98%)    Parameters below as of 05 Oct 2022 05:35  Patient On (Oxygen Delivery Method): room air    
Vital Signs Last 24 Hrs  T(C): 36.9 (03 Oct 2022 13:38), Max: 37.3 (02 Oct 2022 22:16)  T(F): 98.4 (03 Oct 2022 13:38), Max: 99.1 (02 Oct 2022 22:16)  HR: 60 (03 Oct 2022 13:38) (60 - 80)  BP: 142/61 (03 Oct 2022 13:38) (142/61 - 167/90)  BP(mean): 90 (02 Oct 2022 16:54) (90 - 90)  RR: 18 (03 Oct 2022 13:38) (16 - 19)  SpO2: 96% (03 Oct 2022 13:38) (94% - 97%)    Parameters below as of 03 Oct 2022 13:38  Patient On (Oxygen Delivery Method): room air    
Vital Signs Last 24 Hrs  T(C): 36.6 (30 Sep 2022 11:02), Max: 36.8 (30 Sep 2022 05:30)  T(F): 97.9 (30 Sep 2022 11:02), Max: 98.2 (30 Sep 2022 05:30)  HR: 67 (30 Sep 2022 11:02) (59 - 67)  BP: 118/90 (30 Sep 2022 11:02) (118/90 - 160/76)  BP(mean): --  RR: 18 (30 Sep 2022 11:02) (17 - 18)  SpO2: 96% (30 Sep 2022 11:02) (95% - 100%)    Parameters below as of 30 Sep 2022 11:02  Patient On (Oxygen Delivery Method): room air    
Vital Signs Last 24 Hrs  T(C): 36.9 (07 Oct 2022 05:38), Max: 36.9 (07 Oct 2022 05:38)  T(F): 98.4 (07 Oct 2022 05:38), Max: 98.4 (07 Oct 2022 05:38)  HR: 66 (07 Oct 2022 05:38) (66 - 69)  BP: 142/76 (07 Oct 2022 05:38) (142/76 - 144/60)  BP(mean): --  RR: 18 (07 Oct 2022 05:38) (17 - 18)  SpO2: 100% (07 Oct 2022 05:38) (97% - 100%)    Parameters below as of 07 Oct 2022 05:38  Patient On (Oxygen Delivery Method): room air

## 2022-10-12 NOTE — DISCHARGE NOTE PROVIDER - NSDCMRMEDTOKEN_GEN_ALL_CORE_FT
acetaminophen 650 mg oral tablet, extended release: 2 tab(s) orally every 8 hours, As Needed -for moderate pain or severe pain.  alendronate 70 mg oral tablet: 1 tab(s) orally once a week  amiodarone 200 mg oral tablet: 1 tab(s) orally once a day  amLODIPine 2.5 mg oral tablet: 1 tab(s) orally once a day  atorvastatin 20 mg oral tablet: 1 tab(s) orally once a day  Calcium 500+D oral tablet, chewable: 1 tab(s) orally once a day  Colace Glycerin adult rectal suppository: 1 suppository(ies) rectal every other day, As Needed  levothyroxine 125 mcg (0.125 mg) oral tablet: 1 tab(s) orally once a day  lisinopril 40 mg oral tablet: 1 tab(s) orally once a day  Multiple Vitamins oral tablet: 1 tab(s) orally once a day   Probiotic Formula: 1 cap(s) orally once a day  propranolol 40 mg oral tablet: 1 tab(s) orally 2 times a day at 10:00 am and 10:00 pm  risperiDONE 0.25 mg oral tablet: 1 tab(s) orally once a day (at bedtime)   acetaminophen 650 mg oral tablet, extended release: 2 tab(s) orally every 8 hours, As Needed -for moderate pain or severe pain.  alendronate 70 mg oral tablet: 1 tab(s) orally once a week  Calcium 500+D oral tablet, chewable: 1 tab(s) orally once a day  Colace Glycerin adult rectal suppository: 1 suppository(ies) rectal every other day, As Needed  Multiple Vitamins oral tablet: 1 tab(s) orally once a day   Probiotic Formula: 1 cap(s) orally once a day  risperiDONE 0.25 mg oral tablet: 1 tab(s) orally once a day (at bedtime)   acetaminophen 650 mg oral tablet, extended release: 2 tab(s) orally every 8 hours, As Needed -for moderate pain or severe pain.  alendronate 70 mg oral tablet: 1 tab(s) orally once a week  atorvastatin 20 mg oral tablet: 1 tab(s) orally once a day  Calcium 500+D oral tablet, chewable: 1 tab(s) orally once a day  Colace Glycerin adult rectal suppository: 1 suppository(ies) rectal every other day, As Needed  divalproex sodium 125 mg oral delayed release capsule: 1 cap(s) orally once a day at 5pm.  Multiple Vitamins oral tablet: 1 tab(s) orally once a day   ocular lubricant ophthalmic solution: 1 drop(s) to each affected eye 3 times a day  Probiotic Formula: 1 cap(s) orally once a day  risperiDONE 0.25 mg oral tablet: 1 tab(s) orally once a day (at bedtime)   acetaminophen 650 mg oral tablet, extended release: 2 tab(s) orally every 8 hours, As Needed -for moderate pain or severe pain.  alendronate 70 mg oral tablet: 1 tab(s) orally once a week  amLODIPine 2.5 mg oral tablet: 1 tab(s) orally once a day  atorvastatin 20 mg oral tablet: 1 tab(s) orally once a day  Calcium 500+D oral tablet, chewable: 1 tab(s) orally once a day  Colace Glycerin adult rectal suppository: 1 suppository(ies) rectal every other day, As Needed  divalproex sodium 125 mg oral delayed release capsule: 1 cap(s) orally once a day at 5pm.  Multiple Vitamins oral tablet: 1 tab(s) orally once a day   ocular lubricant ophthalmic solution: 1 drop(s) to each affected eye 3 times a day  Probiotic Formula: 1 cap(s) orally once a day  risperiDONE 0.25 mg oral tablet: 1 tab(s) orally once a day (at bedtime)   acetaminophen 650 mg oral tablet, extended release: 2 tab(s) orally every 8 hours, As Needed -for moderate pain or severe pain.  alendronate 70 mg oral tablet: 1 tab(s) orally once a week  amLODIPine 2.5 mg oral tablet: 1 tab(s) orally once a day  atorvastatin 20 mg oral tablet: 1 tab(s) orally once a day  Calcium 500+D oral tablet, chewable: 1 tab(s) orally once a day  Colace Glycerin adult rectal suppository: 1 suppository(ies) rectal every other day, As Needed  divalproex sodium 125 mg oral delayed release capsule: 1 cap(s) orally once a day at 5pm.  Multiple Vitamins oral tablet: 1 tab(s) orally once a day   ocular lubricant ophthalmic solution: 1 drop(s) to each affected eye 3 times a day  polyethylene glycol 3350 oral powder for reconstitution: 17 gram(s) orally 2 times a day  Probiotic Formula: 1 cap(s) orally once a day  risperiDONE 0.25 mg oral tablet: 1 tab(s) orally once a day (at bedtime)  senna leaf extract oral tablet: 2 tab(s) orally once a day (at bedtime)   alendronate 70 mg oral tablet: 1 tab(s) orally once a week  amiodarone 200 mg oral tablet: 1 tab(s) orally once a day  amLODIPine 2.5 mg oral tablet: 1 tab(s) orally once a day  atorvastatin 20 mg oral tablet: 1 tab(s) orally once a day  divalproex sodium 125 mg oral delayed release capsule: 1 cap(s) orally once a day at 5pm.  levothyroxine 125 mcg (0.125 mg) oral tablet: 1 tab(s) orally once a day  lisinopril 40 mg oral tablet: 1 tab(s) orally once a day  Multiple Vitamins oral tablet: 1 tab(s) orally once a day   ocular lubricant ophthalmic solution: 1 drop(s) to each affected eye 3 times a day  polyethylene glycol 3350 oral powder for reconstitution: 17 gram(s) orally 2 times a day  propranolol 40 mg oral tablet: 1 tab(s) orally 2 times a day at 10:00 am and 10:00 pm  senna leaf extract oral tablet: 2 tab(s) orally once a day (at bedtime)

## 2022-10-12 NOTE — DISCHARGE NOTE PROVIDER - NSDCFUSCHEDAPPT_GEN_ALL_CORE_FT
Maria Eugenia Villaseñor  [facility]  GERIATRICS [address] [place] [address]   Scheduled Appointment: 11/04/2022    [address]   Scheduled Appointment: 11/09/2022

## 2022-10-12 NOTE — BH CONSULTATION LIAISON PROGRESS NOTE - NSBHFUPINTERVALHXFT_PSY_A_CORE
Handoff received from Dr. Johnson, case discussed in morning rounds, as per reports, pt. is mostly alert, not sedated on Depakote, tolerating well, has been sundowning through.  Chart reviewed, no prns overnight, compliant with Depakote. Patient seen/evaluated, daughter present at bedside. Patient alert, but minimally engaging , making poor eye contact and minimally verbal. No stiffness or rigidity noted on exam.     Case discussed with Patient's daughter Vani, Dr. Gale present as well, discussed that patient has been tolerating Depakote well, not sedated on it, in fair behavioral control, not requiring PRNs,  however will continue to assess/monitor for sedation and will make adjustment in Depakote dose if needed. Daughter verbalized understanding and in agreement.         
Patient was interviewed at her bedside. She appeared calm, cooperative. Patient was disoriented in place and time. She believed that she is " on the roof top" and right now is winter of 1953. denied any safety concerns. She denied any auditory or visual hallucinations, denied feeling paranoid. She is not sure why she is here. reported having headache. As per her daughter that was at bedside Vani [phone], [phone]: patient suffered from dementia for many years, however, worsened within the last few months, became agitated, screaming, cursing, but able to calm down after an hour or so. Tried on antipsychotics without significant improvement, risperidone helped " a little bit", " would wear off pretty quickly". Also reported that they used CBD on the patient which helped her to calm down. Last three days before the hospitalization patient was agitated, screaming " non-stop". At her baseline she is oriented in place most of the time and able to remember current year. As per her daughter the doctor wanted her to be on 137 mg of levothyroxine, but she was " too hungry after this dose" and they continued her on 125 mg, intermittently giving it with food.  Patient received haldol in the ED which appeared to be helpful.
patient pleasant now currenlty but very confused, per daughter/son confusion is worse than before  has been getting multiple ativan PRNs. QTc still prolonged, discussed this with family, will attempt stabilization of agitatoin with depakote for now as this is minimal QTc issues, counseling provided
patient asking for DANIE landinx1-2 know she is in a hospital     discussed with family, patient appears more calm, lucid, less agitated, family agrees 
patient pleasant on exam but previously agitated, screaming, AOx1-2 knows she is in a hospital    discussed with family, will go up on depakote for now, discussed QTc prolongation and usage of QT prolonging meds in future, OK with continuing depakote for now

## 2022-10-12 NOTE — DISCHARGE NOTE PROVIDER - HOSPITAL COURSE
89F (currently A&Ox0-1) PMH of TIA, AFib (No AC), HTN, hypothyroidism, multiple falls with vertebral fractures, moderate to severe dementia presents with worsening agitation likely 2/2 progressive dementia, r/o organic causes     Agitation due to dementia.   - P/w worsening agitation, not well controlled with Risperidone suspect likely 2/2 progression of dementia    - UA, RVP, VSS no signs of active infection   - CTH (-) acute stroke/hemorrhage notable for ischemic changes  - Currently calm and cooperative   - Had headache likely 2/2 migraine in the setting of not sleep well and agitation moving all extremities, grossly intact sensation, speaking in clear sentences  - C/w depakote 250 HS daughter would like psych to reeval as she feels pt bit more confused on depakote, discussed need to find balance.    Exposure to COVID-19 virus 10/9 COVID, remains asymptomatic  monitoring for symptoms, labs stable.    Hypothyroidism.   - TSH elevated at 19.43 with normal free T4 10.41, spoke with son on administration of LTX and was told that patient was taking medication after eating breakfast which may be cause of decreased absorption, however given worsening agitation may be contributing to psychiatric disease per [employer] last TSH also elevated at 14 per family no changes to LTX medications recently   - Per Endo attending Sr. Jackson no need for inpatient consultation, patient will need to have TFTs repeated in 4 weeks, especially since LTX was administered incorrectly continue with home dose of 125mcgs here    QT prolongation.   - QTC on outpt  likely 2/2 Risperidone use repeat EKG 10/1, manual correction QTc 510   - Avoid further QTc prolonging meds, has ativan PRN for agitation, attempt to redirect patient prior to administration    AFIB/HTN continued on Propanolol, Amiodarone, Norvasc, and Zestril - HR and BP controlled; Not on a/c likely due to recurrent falls    Dispo - [facility] 89F (currently A&Ox0-1) PMH of TIA, AFib (No AC), HTN, hypothyroidism, multiple falls with vertebral fractures, moderate to severe dementia presents with worsening agitation likely 2/2 progressive dementia, r/o organic causes     Agitation due to dementia.   - P/w worsening agitation, not well controlled with Risperidone suspect likely 2/2 progression of dementia    - UA, RVP, VSS no signs of active infection   - CTH (-) acute stroke/hemorrhage notable for ischemic changes  - Currently calm and cooperative   - Had headache likely 2/2 migraine in the setting of not sleep well and agitation moving all extremities, grossly intact sensation, speaking in clear sentences  - C/w depakote 250 HS daughter would like psych to reeval as she feels pt bit more confused on depakote, discussed need to find balance.    Exposure to COVID-19 virus 10/9 COVID, remains asymptomatic  monitoring for symptoms, labs stable.    Hypothyroidism.   - TSH elevated at 19.43 with normal free T4 10.41, spoke with son on administration of LTX and was told that patient was taking medication after eating breakfast which may be cause of decreased absorption, however given worsening agitation may be contributing to psychiatric disease per [employer] last TSH also elevated at 14 per family no changes to LTX medications recently   - Per Endo attending Sr. Jackson no need for inpatient consultation, patient will need to have TFTs repeated in 4 weeks, especially since LTX was administered incorrectly continue with home dose of 125mcgs here    QT prolongation.   - QTC on outpt  likely 2/2 Risperidone use repeat EKG 10/1, manual correction QTc 510   - Avoid further QTc prolonging meds, has ativan PRN for agitation, attempt to redirect patient prior to administration    AFIB/HTN continued on Propanolol, Amiodarone, Norvasc, and Zestril - HR and BP controlled; Not on a/c likely due to recurrent falls    Dispo - [facility]       Patient is medically cleared for discharge on ___________. Case discussed with ______________ 89F (currently A&Ox0-1) PMH of TIA, AFib (No AC), HTN, hypothyroidism, multiple falls with vertebral fractures, moderate to severe dementia presents with worsening agitation likely 2/2 progressive dementia, r/o organic causes     Agitation due to dementia.   - P/w worsening agitation, not well controlled with Risperidone suspect likely 2/2 progression of dementia    - UA, RVP, VSS no signs of active infection   - CTH (-) acute stroke/hemorrhage notable for ischemic changes  - Currently calm and cooperative   - Had headache likely 2/2 migraine in the setting of not sleep well and agitation moving all extremities, grossly intact sensation, speaking in clear sentences  - C/w depakote 250 HS daughter would like psych to reeval as she feels pt bit more confused on depakote, discussed need to find balance.    Exposure to COVID-19 virus 10/9 COVID, remains asymptomatic  monitoring for symptoms, labs stable.    Hypothyroidism.   - TSH elevated at 19.43 with normal free T4 10.41, spoke with son on administration of LTX and was told that patient was taking medication after eating breakfast which may be cause of decreased absorption, however given worsening agitation may be contributing to psychiatric disease per [employer] last TSH also elevated at 14 per family no changes to LTX medications recently   - Per Endo attending Sr. Jackson no need for inpatient consultation, patient will need to have TFTs repeated in 4 weeks, especially since LTX was administered incorrectly continue with home dose of 125mcgs here    QT prolongation.   - QTC on outpt  likely 2/2 Risperidone use repeat EKG 10/1, manual correction QTc 510   - Avoid further QTc prolonging meds, has ativan PRN for agitation, attempt to redirect patient prior to administration    AFIB/HTN continued on Propanolol, Amiodarone, Norvasc, and Zestril - HR and BP controlled; Not on a/c likely due to recurrent falls    Dispo - skilled nursing    Patient is medically cleared for discharge on 10/19. Case discussed with Dr. Gale 89F (currently A&Ox0-1) PMH of TIA, AFib (No AC), HTN, hypothyroidism, multiple falls with vertebral fractures, moderate to severe dementia presents with worsening agitation likely 2/2 progressive dementia, r/o organic causes     Agitation due to dementia. P/w worsening agitation, not well controlled with Risperidone suspect likely 2/2 progression of dementia    - UA, RVP, VSS no signs of active infectio. CTH (-) acute stroke/hemorrhage notable for ischemic changes  - pt's behavior stabilized on depakote 125mg q5pm    Exposure to COVID-19 virus 10/9 COVID, remains asymptomatic  monitoring for symptoms, labs stable.    Hypothyroidism.   - TSH elevated at 19.43 with normal free T4 10.41, spoke with son on administration of LTX and was told that patient was taking medication after eating breakfast which may be cause of decreased absorption, however given worsening agitation may be contributing to psychiatric disease per [employer] last TSH also elevated at 14 per family no changes to LTX medications recently   - Per Endo attending Sr. Jackson no need for inpatient consultation, patient will need to have TFTs repeated in 4 weeks, especially since LTX was administered incorrectly continue with home dose of 125mcgs here    QT prolongation.   - QTC on outpt  likely 2/2 Risperidone use repeat EKG 10/1, manual correction QTc 510   - Avoid further QTc prolonging meds, has ativan PRN for agitation, attempt to redirect patient prior to administration    AFIB/HTN continued on Propanolol, Amiodarone, Norvasc, and Zestril - HR and BP controlled; Not on a/c likely due to recurrent falls    Dispo - [facility]    Patient is medically cleared for discharge on 10/19. Case discussed with Dr. Gale

## 2022-10-12 NOTE — PROGRESS NOTE ADULT - SUBJECTIVE AND OBJECTIVE BOX
[facility] Division of Hospital Medicine  Zohra Gale MD  Pager (M-F, 8A-5P):  In-house pager [phone]; Long-range pager [phone]  Other Times:  Please page Hospitalist in Charge -  In-house pager [phone]    Patient is a 89y old  Female who presents with a chief complaint of agitation (12 Oct 2022 08:42)    SUBJECTIVE / OVERNIGHT EVENTS:  RN reports pt sundowns but redirectable.  ...  ADDITIONAL REVIEW OF SYSTEMS:    MEDICATIONS  (STANDING):  aMIOdarone    Tablet 200 milliGRAM(s) Oral daily  amLODIPine   Tablet 2.5 milliGRAM(s) Oral daily  artificial tears (preservative free) Ophthalmic Solution 1 Drop(s) Both EYES three times a day  atorvastatin 20 milliGRAM(s) Oral at bedtime  diVALproex Sprinkle 250 milliGRAM(s) Oral at bedtime  enoxaparin Injectable 30 milliGRAM(s) SubCutaneous every 24 hours  levothyroxine 125 MICROGram(s) Oral daily  lisinopril 40 milliGRAM(s) Oral daily  multivitamin 1 Tablet(s) Oral daily  polyethylene glycol 3350 17 Gram(s) Oral two times a day  PPD  5 Tuberculin Unit(s) Injectable 5 Unit(s) IntraDermal once  propranolol 40 milliGRAM(s) Oral two times a day  psyllium Powder 1 Packet(s) Oral two times a day  senna 2 Tablet(s) Oral at bedtime    MEDICATIONS  (PRN):    PHYSICAL EXAM:  Vital Signs Last 24 Hrs  T(C): 37.2 (12 Oct 2022 11:05), Max: 37.2 (12 Oct 2022 05:15)  T(F): 99 (12 Oct 2022 11:05), Max: 99 (12 Oct 2022 05:15)  HR: 65 (12 Oct 2022 11:05) (65 - 78)  BP: 110/61 (12 Oct 2022 11:05) (110/61 - 159/80)  BP(mean): 96 (12 Oct 2022 05:15) (96 - 97)  RR: 18 (12 Oct 2022 11:05) (16 - 18)  SpO2: 99% (12 Oct 2022 11:05) (97% - 99%)    Parameters below as of 12 Oct 2022 11:05  Patient On (Oxygen Delivery Method): room air    CONSTITUTIONAL: thin elderly woman NAD  RESPIRATORY: Normal respiratory effort; lungs are clear to auscultation bilaterally  CARDIOVASCULAR: Regular rate and rhythm, normal S1 and S2, no murmur/rub/gallop; No lower extremity edema; Peripheral pulses are 2+ bilaterally  ABDOMEN: Nontender to palpation, normoactive bowel sounds, no rebound/guarding  MUSCULOSKELETAL: no clubbing or cyanosis of digits; no joint swelling or tenderness to palpation  PSYCH: calm, oriented self, 1923  NEUROLOGY: nonfocal  SKIN: No rashes; no palpable lesions    LABS:                        11.9   6.83  )-----------( 210      ( 12 Oct 2022 05:53 )             36.4     10-12    140  |  102  |  30<H>  ----------------------------<  113<H>  4.3   |  24  |  1.15    Ca    9.0      12 Oct 2022 05:53  Phos  3.2     10-12  Mg     2.10     10-12    RADIOLOGY & ADDITIONAL TESTS:  Results Reviewed:   Imaging Personally Reviewed:  Electrocardiogram Personally Reviewed:    COORDINATION OF CARE:  Care Discussed with Consultants/Other Providers [Y/N]: RN, SW, CM, ACP, psych re overall care   Prior or Outpatient Records Reviewed [Y/N]:   [facility] Division of Hospital Medicine  Zohra Gale MD  Pager (M-F, 8A-5P):  In-house pager [phone]; Long-range pager [phone]  Other Times:  Please page Hospitalist in Charge -  In-house pager [phone]    Patient is a 89y old  Female who presents with a chief complaint of agitation (12 Oct 2022 08:42)    SUBJECTIVE / OVERNIGHT EVENTS:  RN reports pt sundowns but redirectable.  Pt seen with daughter Vani at bedside, yelling out while being changed on PCA. Calmer after.  ADDITIONAL REVIEW OF SYSTEMS:    MEDICATIONS  (STANDING):  aMIOdarone    Tablet 200 milliGRAM(s) Oral daily  amLODIPine   Tablet 2.5 milliGRAM(s) Oral daily  artificial tears (preservative free) Ophthalmic Solution 1 Drop(s) Both EYES three times a day  atorvastatin 20 milliGRAM(s) Oral at bedtime  diVALproex Sprinkle 250 milliGRAM(s) Oral at bedtime  enoxaparin Injectable 30 milliGRAM(s) SubCutaneous every 24 hours  levothyroxine 125 MICROGram(s) Oral daily  lisinopril 40 milliGRAM(s) Oral daily  multivitamin 1 Tablet(s) Oral daily  polyethylene glycol 3350 17 Gram(s) Oral two times a day  PPD  5 Tuberculin Unit(s) Injectable 5 Unit(s) IntraDermal once  propranolol 40 milliGRAM(s) Oral two times a day  psyllium Powder 1 Packet(s) Oral two times a day  senna 2 Tablet(s) Oral at bedtime    MEDICATIONS  (PRN):    PHYSICAL EXAM:  Vital Signs Last 24 Hrs  T(C): 37.2 (12 Oct 2022 11:05), Max: 37.2 (12 Oct 2022 05:15)  T(F): 99 (12 Oct 2022 11:05), Max: 99 (12 Oct 2022 05:15)  HR: 65 (12 Oct 2022 11:05) (65 - 78)  BP: 110/61 (12 Oct 2022 11:05) (110/61 - 159/80)  BP(mean): 96 (12 Oct 2022 05:15) (96 - 97)  RR: 18 (12 Oct 2022 11:05) (16 - 18)  SpO2: 99% (12 Oct 2022 11:05) (97% - 99%)    Parameters below as of 12 Oct 2022 11:05  Patient On (Oxygen Delivery Method): room air    CONSTITUTIONAL: thin elderly woman NAD  RESPIRATORY: Normal respiratory effort; lungs are clear to auscultation bilaterally  CARDIOVASCULAR: Regular rate and rhythm, normal S1 and S2, no murmur/rub/gallop; No lower extremity edema; Peripheral pulses are 2+ bilaterally  ABDOMEN: Nontender to palpation, normoactive bowel sounds, no rebound/guarding  MUSCULOSKELETAL: no clubbing or cyanosis of digits; no joint swelling or tenderness to palpation  PSYCH: calm, oriented self, 1923  NEUROLOGY: nonfocal  SKIN: No rashes; no palpable lesions    LABS:                        11.9   6.83  )-----------( 210      ( 12 Oct 2022 05:53 )             36.4     10-12    140  |  102  |  30<H>  ----------------------------<  113<H>  4.3   |  24  |  1.15    Ca    9.0      12 Oct 2022 05:53  Phos  3.2     10-12  Mg     2.10     10-12    RADIOLOGY & ADDITIONAL TESTS:  Results Reviewed:   Imaging Personally Reviewed:  Electrocardiogram Personally Reviewed:    COORDINATION OF CARE:  Care Discussed with Consultants/Other Providers [Y/N]: RN, SW, CM, ACP, psych re overall care   Prior or Outpatient Records Reviewed [Y/N]:

## 2022-10-12 NOTE — PROGRESS NOTE ADULT - PROBLEM SELECTOR PLAN 4
- QTC on outpt ekg 508, likely 2/2 risperidone use  - repeat EKG 10/1, manual correction QTc 510   - avoid further QTc prolonging meds, has ativan PRN for agitation, attempt to redirect patient prior to administration  - Corrected QTc <500.

## 2022-10-12 NOTE — PROGRESS NOTE ADULT - NSPROGADDITIONALINFOA_GEN_ALL_CORE
10/10 d/w son Wil re overall care, including COVID exposure  10/11 d/w daughter Vani at bedside 10/10 d/w son Wil re overall care, including COVID exposure  10/11, 10/12 d/w daughter Vani at bedside

## 2022-10-12 NOTE — CHART NOTE - NSCHARTNOTEFT_GEN_A_CORE
Placed PPD on R forearm LOT # [id number] EXP: 12/23 - Will read in 48-72 hours  No issues patient tolerated well

## 2022-10-12 NOTE — BH CONSULTATION LIAISON PROGRESS NOTE - NSBHCONSULTFOLLOWAFTERCARE_PSY_A_CORE FT
Follow up with the psychiatrist at the facility    Additional referrals:  [facility] outpt[facility] clinic: [phone]  [facility] outpt. walk in clinic : [phone] 
Cleared for [facility] to [facility] (unlikely that patient will tolerate being at home)    [facility] psych [phone]

## 2022-10-12 NOTE — BH CONSULTATION LIAISON PROGRESS NOTE - NSBHATTESTBILLINGAW_PSY_A_CORE
[id number] Inpatient care - high complexity - 35 minutes
[id number] Inpatient care - high complexity - 35 minutes
[id number] Inpatient care - high complexity - 35 minutes
[id number] Inpatient care - moderate complexity - 25 minutes
[id number] Inpatient care - moderate complexity - 25 minutes

## 2022-10-12 NOTE — BH CONSULTATION LIAISON PROGRESS NOTE - NSBHASSESSMENTFT_PSY_ALL_CORE
Patient is a 89 year old female  with PMH of TIA, afib not on a/c, HTN, hypothyroidism, GERD, multiple falls with vertebral fractures, moderate to severe dementia presents with worsening agitation likely 2/2 progressive dementia, admitted to r/o organic causes. Patient comes from home, lives with 24/7 aid as well as daughter in same complex. Patient with no previous psychiatric hx prior to dementia. Has had medication trials given by her geriatrician (Seroquel, Remeron, and trazodone all which made patient agitation worse. Tried risperidone which helped for a bit but was inconsistent and patient still had outbreaks for agitation and yelling). This past week patient was uncontrollable, yelling and agitated prompting there ED visit. Patient mostly gets agitated in the evening but timing can be unpredictable. No aggression towards others. No harm to self. Discussed with the daughter that levothyroxine should be given on an empty stomach, separate from other medications. Patient is c/o headache.  TSH 19, , CT head- no acute changes, Folate elevated, B12 wnl, UA wnl.    10/3 - patient still agitated, difficult to redirect, per manual QTc correction from cardiology it is still prolonged, would begin depakote as below. Discussed risks/benefits with family of antipsychotics, depakote, aware of black box warning with antipsychotics etc. If Qtc is to be chronically prolonged then will discuss how this fits with GOC and qtc prolonging antipsychotics with family.   10/5 - patient less agitated, per cardio low/no expecation of reversibility with qtc prolongation, would increase depakote as below, risks/benefits discussed with family  10/7 - patient less agitated, tolerating depakote without issue, would benefit from VPA level trough after a few days but does not need ot be in hospital for this, discussed meds, risks/benefits, and dispo clearance with family (Wil)  10/12: Patient alert, in fair behavioral control, tolerating Depakote well, has been sundowning, will change the time of VPA as below. Care coordinated with pt.'s daughter at bedside.       PLAN  - Continue Depakote to 250mg qHS (5PM) (HOLD for SEDATION)  - Monitor platelets, LFTs, Ammonia and VPA level (VPA level 18.50 on 10/08)  - defer level of observation to primary team  -Monitor EKG for qtc  - if qtc < 500 can use zyprexa 1.25mg PO/IM q8hrs for severe agitation, may call C/L psychiatry for further recommendations.   - Case d/w Dr. Gale   Patient is a 89 year old female  with PMH of TIA, afib not on a/c, HTN, hypothyroidism, GERD, multiple falls with vertebral fractures, moderate to severe dementia presents with worsening agitation likely 2/2 progressive dementia, admitted to r/o organic causes. Patient comes from home, lives with 24/7 aid as well as daughter in same complex. Patient with no previous psychiatric hx prior to dementia. Has had medication trials given by her geriatrician (Seroquel, Remeron, and trazodone all which made patient agitation worse. Tried risperidone which helped for a bit but was inconsistent and patient still had outbreaks for agitation and yelling). This past week patient was uncontrollable, yelling and agitated prompting there ED visit. Patient mostly gets agitated in the evening but timing can be unpredictable. No aggression towards others. No harm to self. Discussed with the daughter that levothyroxine should be given on an empty stomach, separate from other medications. Patient is c/o headache.  TSH 19, , CT head- no acute changes, Folate elevated, B12 wnl, UA wnl.    10/3 - patient still agitated, difficult to redirect, per manual QTc correction from cardiology it is still prolonged, would begin depakote as below. Discussed risks/benefits with family of antipsychotics, depakote, aware of black box warning with antipsychotics etc. If Qtc is to be chronically prolonged then will discuss how this fits with GOC and qtc prolonging antipsychotics with family.   10/5 - patient less agitated, per cardio low/no expecation of reversibility with qtc prolongation, would increase depakote as below, risks/benefits discussed with family  10/7 - patient less agitated, tolerating depakote without issue, would benefit from VPA level trough after a few days but does not need ot be in hospital for this, discussed meds, risks/benefits, and dispo clearance with family (Wil)  10/12: Patient alert, in fair behavioral control, tolerating Depakote well, has been sundowning, will change the time of VPA as below. Care coordinated with pt.'s daughter at bedside.       PLAN  - Continue Depakote to 250mg qHS (5PM) (HOLD for SEDATION)  - Monitor platelets, LFTs, Ammonia and VPA level (VPA level 18.50 on 10/08)  - defer level of observation to primary team  -Monitor EKG for qtc  - if qtc < 500 can use PRN zyprexa 1.25mg PO/IM q8hrs for severe agitation, may call C/[facility] psychiatry for further recommendations.   - Case d/w Dr. Gale

## 2022-10-12 NOTE — PROGRESS NOTE ADULT - PROBLEM SELECTOR PLAN 3
- TSH elevated at 19.43 with normal free T4 10.41, spoke with son on administration of LTX and was told that patient was taking medication after eating breakfast which may be cause of decreased absorption, however given worsening agitation may be contributing to psychiatric disease   - per [employer] last TSH also elevated ar 14-->12, per family no changes to LTX medications recently   - continue with home LTX at this time, spoke with Endo attending Sr. Jackson no need for inpatient consultation, patient will need to have TFTs repeated in 4 weeks, especially since LTX was administered incorrectly (after food at home)   - continue with home dose of 125mcgs here (at least one hour before food)  - TPO ab wnl  TSH 19 --> 6, f/u FT4 - TSH elevated at 19.43 with normal free T4 10.41, spoke with son on administration of LTX and was told that patient was taking medication after eating breakfast which may be cause of decreased absorption, however given worsening agitation may be contributing to psychiatric disease   - per [employer] last TSH also elevated ar 14-->12, per family no changes to LTX medications recently   - continue with home LTX at this time, spoke with Endo attending Sr. Jackson no need for inpatient consultation, patient will need to have TFTs repeated in 4 weeks, especially since LTX was administered incorrectly (after food at home)   - continue with home dose of 125mcgs here (at least one hour before food)  - TPO ab wnl  TSH 19 --> 6, FT4 1.8

## 2022-10-12 NOTE — BH CONSULTATION LIAISON PROGRESS NOTE - NSBHCONSFOLLOWNEEDS_PSY_ALL_CORE
Needs further psych safety assessment prior to discharge
No psychiatric contraindications to discharge
Needs further psych safety assessment prior to discharge

## 2022-10-12 NOTE — BH CONSULTATION LIAISON PROGRESS NOTE - NSBHMSESPABN_PSY_A_CORE
Soft volume/Decreased productivity
Soft volume/Decreased productivity
Soft volume/Decreased productivity/Other
Soft volume/Decreased productivity
Soft volume/Decreased productivity

## 2022-10-12 NOTE — BH CONSULTATION LIAISON PROGRESS NOTE - CURRENT MEDICATION
MEDICATIONS  (STANDING):  aMIOdarone    Tablet 200 milliGRAM(s) Oral daily  amLODIPine   Tablet 2.5 milliGRAM(s) Oral daily  artificial tears (preservative free) Ophthalmic Solution 2 Drop(s) Both EYES four times a day  atorvastatin 20 milliGRAM(s) Oral at bedtime  diVALproex  milliGRAM(s) Oral at bedtime  enoxaparin Injectable 30 milliGRAM(s) SubCutaneous every 24 hours  levothyroxine 125 MICROGram(s) Oral daily  lisinopril 40 milliGRAM(s) Oral daily  multivitamin 1 Tablet(s) Oral daily  propranolol 40 milliGRAM(s) Oral two times a day  psyllium Powder 1 Packet(s) Oral two times a day  senna 2 Tablet(s) Oral at bedtime    MEDICATIONS  (PRN):  acetaminophen     Tablet .. 650 milliGRAM(s) Oral every 6 hours PRN Temp greater or equal to 38C (100.4F), Mild Pain (1 - 3)  aluminum hydroxide/magnesium hydroxide/simethicone Suspension 30 milliLiter(s) Oral every 4 hours PRN Dyspepsia  bisacodyl 5 milliGRAM(s) Oral every 12 hours PRN Constipation  LORazepam   Injectable 0.5 milliGRAM(s) IntraMuscular every 4 hours PRN Agitation  melatonin 3 milliGRAM(s) Oral at bedtime PRN Insomnia  ondansetron Injectable 4 milliGRAM(s) IV Push every 8 hours PRN Nausea and/or Vomiting  
MEDICATIONS  (STANDING):  aMIOdarone    Tablet 200 milliGRAM(s) Oral daily  amLODIPine   Tablet 2.5 milliGRAM(s) Oral daily  artificial tears (preservative free) Ophthalmic Solution 1 Drop(s) Both EYES three times a day  atorvastatin 20 milliGRAM(s) Oral at bedtime  diVALproex Sprinkle 250 milliGRAM(s) Oral at bedtime  enoxaparin Injectable 30 milliGRAM(s) SubCutaneous every 24 hours  levothyroxine 125 MICROGram(s) Oral daily  lisinopril 40 milliGRAM(s) Oral daily  multivitamin 1 Tablet(s) Oral daily  polyethylene glycol 3350 17 Gram(s) Oral two times a day  PPD  5 Tuberculin Unit(s) Injectable 5 Unit(s) IntraDermal once  propranolol 40 milliGRAM(s) Oral two times a day  psyllium Powder 1 Packet(s) Oral two times a day  senna 2 Tablet(s) Oral at bedtime    MEDICATIONS  (PRN):  
MEDICATIONS  (STANDING):  aMIOdarone    Tablet 200 milliGRAM(s) Oral daily  amLODIPine   Tablet 2.5 milliGRAM(s) Oral daily  artificial tears (preservative free) Ophthalmic Solution 2 Drop(s) Both EYES four times a day  atorvastatin 20 milliGRAM(s) Oral at bedtime  enoxaparin Injectable 30 milliGRAM(s) SubCutaneous every 24 hours  levothyroxine 125 MICROGram(s) Oral daily  lisinopril 40 milliGRAM(s) Oral daily  multivitamin 1 Tablet(s) Oral daily  propranolol 40 milliGRAM(s) Oral two times a day  psyllium Powder 1 Packet(s) Oral two times a day  senna 2 Tablet(s) Oral at bedtime    MEDICATIONS  (PRN):  acetaminophen     Tablet .. 650 milliGRAM(s) Oral every 6 hours PRN Temp greater or equal to 38C (100.4F), Mild Pain (1 - 3)  aluminum hydroxide/magnesium hydroxide/simethicone Suspension 30 milliLiter(s) Oral every 4 hours PRN Dyspepsia  bisacodyl 5 milliGRAM(s) Oral every 12 hours PRN Constipation  LORazepam   Injectable 0.5 milliGRAM(s) IV Push every 4 hours PRN Agitation  melatonin 3 milliGRAM(s) Oral at bedtime PRN Insomnia  ondansetron Injectable 4 milliGRAM(s) IV Push every 8 hours PRN Nausea and/or Vomiting  
MEDICATIONS  (STANDING):  aMIOdarone    Tablet 200 milliGRAM(s) Oral daily  amLODIPine   Tablet 2.5 milliGRAM(s) Oral daily  atorvastatin 20 milliGRAM(s) Oral at bedtime  enoxaparin Injectable 30 milliGRAM(s) SubCutaneous every 24 hours  levothyroxine 125 MICROGram(s) Oral daily  lisinopril 40 milliGRAM(s) Oral daily  multivitamin 1 Tablet(s) Oral daily  propranolol 40 milliGRAM(s) Oral two times a day    MEDICATIONS  (PRN):  acetaminophen     Tablet .. 650 milliGRAM(s) Oral every 6 hours PRN Temp greater or equal to 38C (100.4F), Mild Pain (1 - 3)  aluminum hydroxide/magnesium hydroxide/simethicone Suspension 30 milliLiter(s) Oral every 4 hours PRN Dyspepsia  LORazepam   Injectable 0.5 milliGRAM(s) IV Push every 4 hours PRN Agitation  melatonin 3 milliGRAM(s) Oral at bedtime PRN Insomnia  ondansetron Injectable 4 milliGRAM(s) IV Push every 8 hours PRN Nausea and/or Vomiting  
MEDICATIONS  (STANDING):  aMIOdarone    Tablet 200 milliGRAM(s) Oral daily  amLODIPine   Tablet 2.5 milliGRAM(s) Oral daily  artificial tears (preservative free) Ophthalmic Solution 2 Drop(s) Both EYES four times a day  atorvastatin 20 milliGRAM(s) Oral at bedtime  diVALproex  milliGRAM(s) Oral at bedtime  enoxaparin Injectable 30 milliGRAM(s) SubCutaneous every 24 hours  levothyroxine 125 MICROGram(s) Oral daily  lisinopril 40 milliGRAM(s) Oral daily  multivitamin 1 Tablet(s) Oral daily  propranolol 40 milliGRAM(s) Oral two times a day  psyllium Powder 1 Packet(s) Oral two times a day  senna 2 Tablet(s) Oral at bedtime    MEDICATIONS  (PRN):  acetaminophen     Tablet .. 650 milliGRAM(s) Oral every 6 hours PRN Temp greater or equal to 38C (100.4F), Mild Pain (1 - 3)  aluminum hydroxide/magnesium hydroxide/simethicone Suspension 30 milliLiter(s) Oral every 4 hours PRN Dyspepsia  bisacodyl 5 milliGRAM(s) Oral every 12 hours PRN Constipation  LORazepam   Injectable 0.5 milliGRAM(s) IntraMuscular every 4 hours PRN Agitation  melatonin 3 milliGRAM(s) Oral at bedtime PRN Insomnia  ondansetron Injectable 4 milliGRAM(s) IV Push every 8 hours PRN Nausea and/or Vomiting

## 2022-10-12 NOTE — PROGRESS NOTE ADULT - PROBLEM SELECTOR PLAN 1
p/w worsening agitation, not well controlled with risperidone suspect likely 2/2 progression of dementia    - labs wnl, UA negative, RVP neg, VSS; no signs of active infection   - CTH negative acute stroke/hemorrhage, notable for ischemic changes c/w elderly age   - currently calm and cooperative (often yells for Romeo--patient's aid at home, when she needs to go to bathroom)  - Had headache likely 2/2 migraine in the setting of not sleep well and agitation: moving all extremities, grossly intact sensation, speaking in clear sentences. Tylenol prn. Encourage water intake  - c/w depakote 250 hs - daughter would like psych to reeval as she feels pt bit more confused on depakote, discussed need to find balance p/w worsening agitation, not well controlled with risperidone suspect likely 2/2 progression of dementia    - labs wnl, UA negative, RVP neg, VSS; no signs of active infection   - CTH negative acute stroke/hemorrhage, notable for ischemic changes c/w elderly age   - currently calm and cooperative (often yells for Romeo--patient's aid at home, when she needs to go to bathroom)  - Had headache likely 2/2 migraine in the setting of not sleep well and agitation: moving all extremities, grossly intact sensation, speaking in clear sentences. Tylenol prn. Encourage water intake  - c/w depakote 250 hs - daughter d/w psych, agree to keep same dose

## 2022-10-12 NOTE — BH CONSULTATION LIAISON PROGRESS NOTE - NSICDXBHPRIMARYDX_PSY_ALL_CORE
Dementia with behavioral disturbance   F03.91  

## 2022-10-13 NOTE — PROGRESS NOTE ADULT - NSPROGADDITIONALINFOA_GEN_ALL_CORE
10/10 d/w son Wil re overall care, including COVID exposure  10/11, 10/12 d/w daughter Vani at bedside 10/10 d/w son Wil re overall care, including COVID exposure  10/11, 10/12 d/w daughter Vani at bedside    await senior living

## 2022-10-13 NOTE — PROGRESS NOTE ADULT - PROBLEM SELECTOR PLAN 3
- TSH elevated at 19.43 with normal free T4 10.41, spoke with son on administration of LTX and was told that patient was taking medication after eating breakfast which may be cause of decreased absorption, however given worsening agitation may be contributing to psychiatric disease   - per [employer] last TSH also elevated ar 14-->12, per family no changes to LTX medications recently   - continue with home LTX at this time, spoke with Endo attending Sr. Jackson no need for inpatient consultation, patient will need to have TFTs repeated in 4 weeks, especially since LTX was administered incorrectly (after food at home)   - continue with home dose of 125mcgs here (at least one hour before food)  - TPO ab wnl  TSH 19 --> 6, FT4 1.8

## 2022-10-13 NOTE — PROGRESS NOTE ADULT - SUBJECTIVE AND OBJECTIVE BOX
[facility] Division of Hospital Medicine  Zohra Gale MD  Pager (M-F, 8A-5P):  In-house pager [phone]; Long-range pager [phone]  Other Times:  Please page Hospitalist in Charge -  In-house pager [phone]    Patient is a 89y old  Female who presents with a chief complaint of agitation (12 Oct 2022 12:31)    SUBJECTIVE / OVERNIGHT EVENTS:  ...  ADDITIONAL REVIEW OF SYSTEMS:    MEDICATIONS  (STANDING):  aMIOdarone    Tablet 200 milliGRAM(s) Oral daily  amLODIPine   Tablet 2.5 milliGRAM(s) Oral daily  artificial tears (preservative free) Ophthalmic Solution 1 Drop(s) Both EYES three times a day  atorvastatin 20 milliGRAM(s) Oral at bedtime  diVALproex Sprinkle 125 milliGRAM(s) Oral <User Schedule>  enoxaparin Injectable 30 milliGRAM(s) SubCutaneous every 24 hours  levothyroxine 125 MICROGram(s) Oral daily  lisinopril 40 milliGRAM(s) Oral daily  multivitamin 1 Tablet(s) Oral daily  polyethylene glycol 3350 17 Gram(s) Oral two times a day  propranolol 40 milliGRAM(s) Oral two times a day  psyllium Powder 1 Packet(s) Oral two times a day  senna 2 Tablet(s) Oral at bedtime    MEDICATIONS  (PRN):    PHYSICAL EXAM:  Vital Signs Last 24 Hrs  T(C): 36.9 (13 Oct 2022 05:20), Max: 37.2 (12 Oct 2022 20:10)  T(F): 98.5 (13 Oct 2022 05:20), Max: 99 (12 Oct 2022 20:10)  HR: 85 (13 Oct 2022 05:20) (70 - 85)  BP: 139/68 (13 Oct 2022 05:20) (137/73 - 139/68)  BP(mean): 93 (12 Oct 2022 20:10) (93 - 93)  RR: 16 (13 Oct 2022 05:20) (16 - 16)  SpO2: 98% (13 Oct 2022 05:20) (97% - 98%)    Parameters below as of 13 Oct 2022 05:20  Patient On (Oxygen Delivery Method): room air    CONSTITUTIONAL: thin elderly woman NAD  RESPIRATORY: Normal respiratory effort; lungs are clear to auscultation bilaterally  CARDIOVASCULAR: Regular rate and rhythm, normal S1 and S2, no murmur/rub/gallop; No lower extremity edema; Peripheral pulses are 2+ bilaterally  ABDOMEN: Nontender to palpation, normoactive bowel sounds, no rebound/guarding  MUSCULOSKELETAL: no clubbing or cyanosis of digits; no joint swelling or tenderness to palpation  PSYCH: calm, oriented self  NEUROLOGY: nonfocal  SKIN: No rashes; no palpable lesions    LABS:                        11.9   6.83  )-----------( 210      ( 12 Oct 2022 05:53 )             36.4     10-12    140  |  102  |  30<H>  ----------------------------<  113<H>  4.3   |  24  |  1.15    Ca    9.0      12 Oct 2022 05:53  Phos  3.2     10-12  Mg     2.10     10-12    RADIOLOGY & ADDITIONAL TESTS:  Results Reviewed:   Imaging Personally Reviewed:  Electrocardiogram Personally Reviewed:    COORDINATION OF CARE:  Care Discussed with Consultants/Other Providers [Y/N]: RN, SW, CM, ACP, psych re overall care    Prior or Outpatient Records Reviewed [Y/N]:   [facility] Division of Hospital Medicine  Zohra Gale MD  Pager (M-F, 8A-5P):  In-house pager [phone]; Long-range pager [phone]  Other Times:  Please page Hospitalist in Charge -  In-house pager [phone]    Patient is a 89y old  Female who presents with a chief complaint of agitation (12 Oct 2022 12:31)    SUBJECTIVE / OVERNIGHT EVENTS:  No problems reported over night. Pt seen with aide Vene at bedside. Sleepy.  ADDITIONAL REVIEW OF SYSTEMS:    MEDICATIONS  (STANDING):  aMIOdarone    Tablet 200 milliGRAM(s) Oral daily  amLODIPine   Tablet 2.5 milliGRAM(s) Oral daily  artificial tears (preservative free) Ophthalmic Solution 1 Drop(s) Both EYES three times a day  atorvastatin 20 milliGRAM(s) Oral at bedtime  diVALproex Sprinkle 125 milliGRAM(s) Oral <User Schedule>  enoxaparin Injectable 30 milliGRAM(s) SubCutaneous every 24 hours  levothyroxine 125 MICROGram(s) Oral daily  lisinopril 40 milliGRAM(s) Oral daily  multivitamin 1 Tablet(s) Oral daily  polyethylene glycol 3350 17 Gram(s) Oral two times a day  propranolol 40 milliGRAM(s) Oral two times a day  psyllium Powder 1 Packet(s) Oral two times a day  senna 2 Tablet(s) Oral at bedtime    MEDICATIONS  (PRN):    PHYSICAL EXAM:  Vital Signs Last 24 Hrs  T(C): 36.9 (13 Oct 2022 05:20), Max: 37.2 (12 Oct 2022 20:10)  T(F): 98.5 (13 Oct 2022 05:20), Max: 99 (12 Oct 2022 20:10)  HR: 85 (13 Oct 2022 05:20) (70 - 85)  BP: 139/68 (13 Oct 2022 05:20) (137/73 - 139/68)  BP(mean): 93 (12 Oct 2022 20:10) (93 - 93)  RR: 16 (13 Oct 2022 05:20) (16 - 16)  SpO2: 98% (13 Oct 2022 05:20) (97% - 98%)    Parameters below as of 13 Oct 2022 05:20  Patient On (Oxygen Delivery Method): room air    CONSTITUTIONAL: thin elderly woman NAD  RESPIRATORY: Normal respiratory effort; lungs are clear to auscultation bilaterally  CARDIOVASCULAR: Regular rate and rhythm, normal S1 and S2, no murmur/rub/gallop; No lower extremity edema; Peripheral pulses are 2+ bilaterally  ABDOMEN: Nontender to palpation, normoactive bowel sounds, no rebound/guarding  MUSCULOSKELETAL: no clubbing or cyanosis of digits; no joint swelling or tenderness to palpation  PSYCH: calm, oriented self  NEUROLOGY: nonfocal  SKIN: No rashes; no palpable lesions    LABS:                        11.9   6.83  )-----------( 210      ( 12 Oct 2022 05:53 )             36.4     10-12    140  |  102  |  30<H>  ----------------------------<  113<H>  4.3   |  24  |  1.15    Ca    9.0      12 Oct 2022 05:53  Phos  3.2     10-12  Mg     2.10     10-12    RADIOLOGY & ADDITIONAL TESTS:  Results Reviewed:   Imaging Personally Reviewed:  Electrocardiogram Personally Reviewed:    COORDINATION OF CARE:  Care Discussed with Consultants/Other Providers [Y/N]: RN, SW, CM, ACP, psych re overall care    Prior or Outpatient Records Reviewed [Y/N]:

## 2022-10-13 NOTE — PROGRESS NOTE ADULT - PROBLEM SELECTOR PLAN 1
p/w worsening agitation, not well controlled with risperidone suspect likely 2/2 progression of dementia    - labs wnl, UA negative, RVP neg, VSS; no signs of active infection   - CTH negative acute stroke/hemorrhage, notable for ischemic changes c/w elderly age   - currently calm and cooperative (often yells for Romeo--patient's aid at home, when she needs to go to bathroom)  - Had headache likely 2/2 migraine in the setting of not sleep well and agitation: moving all extremities, grossly intact sensation, speaking in clear sentences. Tylenol prn. Encourage water intake  - c/w depakote 250 hs - daughter d/w psych, agree to keep same dose p/w worsening agitation, not well controlled with risperidone suspect likely 2/2 progression of dementia    - labs wnl, UA negative, RVP neg, VSS; no signs of active infection   - CTH negative acute stroke/hemorrhage, notable for ischemic changes c/w elderly age   - currently calm and cooperative (often yells for Romeo--patient's aid at home, when she needs to go to bathroom)  - Had headache likely 2/2 migraine in the setting of not sleep well and agitation: moving all extremities, grossly intact sensation, speaking in clear sentences. Tylenol prn. Encourage water intake  - pt sleepy, coordinated with psych will decrease depakote 250 --> 125 hs, see how does...

## 2022-10-14 NOTE — CHART NOTE - NSCHARTNOTEFT_GEN_A_CORE
R Forearm PPD placed 1400 10/12/22 by ACP  R Forearm PPD read 1630 10/14/22 by ACP : negative- no induration or erythema.

## 2022-10-14 NOTE — PROGRESS NOTE ADULT - PROBLEM SELECTOR PLAN 2
10/9 COVID exposure, +on 10/14 remains asymptomatic  monitoring for symptoms, hold off steroid and remdesivir

## 2022-10-14 NOTE — PROGRESS NOTE ADULT - SUBJECTIVE AND OBJECTIVE BOX
PROGRESS NOTE:     Patient is a 89y old  Female who presents with a chief complaint of agitation (13 Oct 2022 14:27)      SUBJECTIVE / OVERNIGHT EVENTS: no acute event overnight. Reports feeling fine. Denies sob, chest pain, abd pain, HA. Reports drinking the shakes.    ADDITIONAL REVIEW OF SYSTEMS:    MEDICATIONS  (STANDING):  aMIOdarone    Tablet 200 milliGRAM(s) Oral daily  amLODIPine   Tablet 2.5 milliGRAM(s) Oral daily  artificial tears (preservative free) Ophthalmic Solution 1 Drop(s) Both EYES three times a day  atorvastatin 20 milliGRAM(s) Oral at bedtime  diVALproex Sprinkle 125 milliGRAM(s) Oral <User Schedule>  enoxaparin Injectable 30 milliGRAM(s) SubCutaneous every 24 hours  levothyroxine 125 MICROGram(s) Oral daily  lisinopril 40 milliGRAM(s) Oral daily  multivitamin 1 Tablet(s) Oral daily  polyethylene glycol 3350 17 Gram(s) Oral two times a day  propranolol 40 milliGRAM(s) Oral two times a day  psyllium Powder 1 Packet(s) Oral two times a day  senna 2 Tablet(s) Oral at bedtime    MEDICATIONS  (PRN):      CAPILLARY BLOOD GLUCOSE        I&O's Summary      PHYSICAL EXAM:  Vital Signs Last 24 Hrs  T(C): 36.5 (14 Oct 2022 05:10), Max: 36.8 (13 Oct 2022 16:18)  T(F): 97.7 (14 Oct 2022 05:10), Max: 98.2 (13 Oct 2022 16:18)  HR: 84 (14 Oct 2022 05:10) (65 - 84)  BP: 140/65 (14 Oct 2022 05:10) (140/65 - 157/90)  BP(mean): --  RR: 17 (14 Oct 2022 05:10) (16 - 17)  SpO2: 100% (14 Oct 2022 05:10) (97% - 100%)    Parameters below as of 14 Oct 2022 05:10  Patient On (Oxygen Delivery Method): room air        CONSTITUTIONAL: NAD, well-developed  RESPIRATORY: Normal respiratory effort; lungs are clear to auscultation bilaterally  CARDIOVASCULAR: Regular rate and rhythm, normal S1 and S2, no murmur/rub/gallop; No lower extremity edema  ABDOMEN: Nontender to palpation, normoactive bowel sounds, no rebound/guarding  MUSCLOSKELETAL: no clubbing or cyanosis of digits; no joint swelling or tenderness to palpation  SKIN: no new rash, erythema, lesion  PSYCH: calm; affect appropriate    LABS:                      RADIOLOGY & ADDITIONAL TESTS:  Results Reviewed:   Imaging Personally Reviewed:  Electrocardiogram Personally Reviewed:    COORDINATION OF CARE:  Care Discussed with Consultants/Other Providers [Y/N]:  Prior or Outpatient Records Reviewed [Y/N]:

## 2022-10-14 NOTE — PROGRESS NOTE ADULT - PROBLEM SELECTOR PLAN 1
p/w worsening agitation, not well controlled with risperidone suspect likely 2/2 progression of dementia    - labs wnl, UA negative, RVP neg, VSS; no signs of active infection   - CTH negative acute stroke/hemorrhage, notable for ischemic changes c/w elderly age   - currently calm and cooperative (often yells for Romeo--patient's aid at home, when she needs to go to bathroom)  - Had headache likely 2/2 migraine in the setting of not sleep well and agitation: moving all extremities, grossly intact sensation, speaking in clear sentences. Tylenol prn. Encourage water intake  - pt sleepy, coordinated with psych and decreased depakote 250 --> 125 hs--now more awake and engaing

## 2022-10-15 NOTE — PROGRESS NOTE ADULT - SUBJECTIVE AND OBJECTIVE BOX
PROGRESS NOTE:     Patient is a 89y old  Female who presents with a chief complaint of agitation (14 Oct 2022 14:11)      SUBJECTIVE / OVERNIGHT EVENTS: no acute event overnight. Reports feeling alright. Denies sob, fever, chills, cp, n/v/abd pain.    ADDITIONAL REVIEW OF SYSTEMS:    MEDICATIONS  (STANDING):  aMIOdarone    Tablet 200 milliGRAM(s) Oral daily  amLODIPine   Tablet 2.5 milliGRAM(s) Oral daily  artificial tears (preservative free) Ophthalmic Solution 1 Drop(s) Both EYES three times a day  atorvastatin 20 milliGRAM(s) Oral at bedtime  diVALproex Sprinkle 125 milliGRAM(s) Oral <User Schedule>  enoxaparin Injectable 30 milliGRAM(s) SubCutaneous every 24 hours  levothyroxine 125 MICROGram(s) Oral daily  lisinopril 40 milliGRAM(s) Oral daily  multivitamin 1 Tablet(s) Oral daily  polyethylene glycol 3350 17 Gram(s) Oral two times a day  propranolol 40 milliGRAM(s) Oral two times a day  psyllium Powder 1 Packet(s) Oral two times a day  senna 2 Tablet(s) Oral at bedtime    MEDICATIONS  (PRN):  OLANZapine Injectable 1.25 milliGRAM(s) IntraMuscular every 8 hours PRN Agitation      CAPILLARY BLOOD GLUCOSE        I&O's Summary      PHYSICAL EXAM:  Vital Signs Last 24 Hrs  T(C): 36.8 (15 Oct 2022 05:39), Max: 36.9 (14 Oct 2022 20:25)  T(F): 98.3 (15 Oct 2022 05:39), Max: 98.4 (14 Oct 2022 20:25)  HR: 57 (15 Oct 2022 05:39) (57 - 63)  BP: 161/90 (15 Oct 2022 05:39) (139/63 - 161/90)  BP(mean): 108 (15 Oct 2022 05:39) (108 - 119)  RR: 18 (15 Oct 2022 05:39) (17 - 18)  SpO2: 96% (15 Oct 2022 05:39) (96% - 99%)    Parameters below as of 15 Oct 2022 05:39  Patient On (Oxygen Delivery Method): room air        CONSTITUTIONAL: NAD, well-developed  RESPIRATORY: Normal respiratory effort; lungs are clear to auscultation bilaterally  CARDIOVASCULAR: Regular rate and rhythm, normal S1 and S2, no murmur/rub/gallop; No lower extremity edema  ABDOMEN: Nontender to palpation, normoactive bowel sounds, no rebound/guarding  MUSCLOSKELETAL: no clubbing or cyanosis of digits; no joint swelling or tenderness to palpation  SKIN: no new rash, erythema, lesion  PSYCH: calm; affect appropriate    LABS:                        11.9   5.50  )-----------( 218      ( 15 Oct 2022 06:22 )             36.8     10-15    146<H>  |  106  |  32<H>  ----------------------------<  100<H>  3.6   |  27  |  0.89    Ca    9.0      15 Oct 2022 06:22  Phos  3.2     10-15  Mg     2.20     10-15      PT/INR - ( 15 Oct 2022 06:22 )   PT: 11.5 sec;   INR: 0.99 ratio         PTT - ( 15 Oct 2022 06:22 )  PTT:27.2 sec              COORDINATION OF CARE:  Care Discussed with Consultants/Other Providers [Y/N]:  Prior or Outpatient Records Reviewed [Y/N]:

## 2022-10-15 NOTE — PROGRESS NOTE ADULT - PROBLEM SELECTOR PLAN 4
- QTC on outpt ekg 508, likely 2/2 risperidone use  - avoid further QTc prolonging meds, has ativan PRN for agitation, attempt to redirect patient prior to administration  - Continue to monitor EKG

## 2022-10-16 NOTE — PROGRESS NOTE ADULT - PROBLEM SELECTOR PLAN 1
p/w worsening agitation, not well controlled with risperidone suspect likely 2/2 progression of dementia    - labs wnl, UA negative, RVP neg, VSS; no signs of active infection   - CTH negative acute stroke/hemorrhage, notable for ischemic changes c/w elderly age   - currently calm and cooperative (often yells for Romeo--patient's aid at home, when she needs to go to bathroom)  - Had headache likely 2/2 migraine in the setting of not sleep well and agitation: moving all extremities, grossly intact sensation, speaking in clear sentences. Tylenol prn. Encourage water intake  - pt sleepy, coordinated with psych and decreased depakote 250 --> 125 hs--now more awake and engaging

## 2022-10-16 NOTE — PROGRESS NOTE ADULT - SUBJECTIVE AND OBJECTIVE BOX
PROGRESS NOTE:     Patient is a 89y old  Female who presents with a chief complaint of agitation (15 Oct 2022 13:23)      SUBJECTIVE / OVERNIGHT EVENTS: no acute event overnight. Reports feeling fair, denies headache, chest pain, sob. Per sitter, patient has not been coughing.    ADDITIONAL REVIEW OF SYSTEMS:    MEDICATIONS  (STANDING):  aMIOdarone    Tablet 200 milliGRAM(s) Oral daily  amLODIPine   Tablet 2.5 milliGRAM(s) Oral daily  artificial tears (preservative free) Ophthalmic Solution 1 Drop(s) Both EYES three times a day  atorvastatin 20 milliGRAM(s) Oral at bedtime  diVALproex Sprinkle 125 milliGRAM(s) Oral <User Schedule>  enoxaparin Injectable 30 milliGRAM(s) SubCutaneous every 24 hours  levothyroxine 125 MICROGram(s) Oral daily  lisinopril 40 milliGRAM(s) Oral daily  multivitamin 1 Tablet(s) Oral daily  polyethylene glycol 3350 17 Gram(s) Oral two times a day  propranolol 40 milliGRAM(s) Oral two times a day  psyllium Powder 1 Packet(s) Oral two times a day  senna 2 Tablet(s) Oral at bedtime    MEDICATIONS  (PRN):  OLANZapine Injectable 1.25 milliGRAM(s) IntraMuscular every 8 hours PRN Agitation      CAPILLARY BLOOD GLUCOSE        I&O's Summary      PHYSICAL EXAM:  Vital Signs Last 24 Hrs  T(C): 36.9 (16 Oct 2022 14:05), Max: 37.1 (15 Oct 2022 22:00)  T(F): 98.4 (16 Oct 2022 14:05), Max: 98.7 (15 Oct 2022 22:00)  HR: 60 (16 Oct 2022 14:05) (60 - 71)  BP: 153/65 (16 Oct 2022 14:05) (127/64 - 164/87)  BP(mean): 90 (16 Oct 2022 05:48) (90 - 90)  RR: 18 (16 Oct 2022 14:05) (16 - 20)  SpO2: 96% (16 Oct 2022 14:05) (95% - 97%)    Parameters below as of 16 Oct 2022 14:05  Patient On (Oxygen Delivery Method): room air        CONSTITUTIONAL: NAD, well-developed  RESPIRATORY: Normal respiratory effort; lungs are clear to auscultation bilaterally  CARDIOVASCULAR: Regular rate and rhythm, normal S1 and S2, no murmur/rub/gallop; No lower extremity edema  ABDOMEN: Nontender to palpation, normoactive bowel sounds, no rebound/guarding  MUSCLOSKELETAL: no clubbing or cyanosis of digits; no joint swelling or tenderness to palpation  SKIN: no new rash, erythema, lesion  PSYCH: calm; affect appropriate    LABS:                        11.9   5.50  )-----------( 218      ( 15 Oct 2022 06:22 )             36.8     10-15    146<H>  |  106  |  32<H>  ----------------------------<  100<H>  3.6   |  27  |  0.89    Ca    9.0      15 Oct 2022 06:22  Phos  3.2     10-15  Mg     2.20     10-15      PT/INR - ( 15 Oct 2022 06:22 )   PT: 11.5 sec;   INR: 0.99 ratio         PTT - ( 15 Oct 2022 06:22 )  PTT:27.2 sec            RADIOLOGY & ADDITIONAL TESTS:  Results Reviewed:   Imaging Personally Reviewed:  Electrocardiogram Personally Reviewed:    COORDINATION OF CARE:  Care Discussed with Consultants/Other Providers [Y/N]:  Prior or Outpatient Records Reviewed [Y/N]:

## 2022-10-17 NOTE — PROGRESS NOTE ADULT - PROBLEM SELECTOR PLAN 2
10/9 COVID exposure, +on 10/14 remains asymptomatic  monitoring for symptoms, hold off steroid and remdesivir 10/9 COVID exposure, +on 10/14 remains asymptomatic  monitoring for symptoms, consider remdesivir if develops sx

## 2022-10-17 NOTE — PROGRESS NOTE ADULT - SUBJECTIVE AND OBJECTIVE BOX
[facility] Division of Hospital Medicine  Zohra Gale MD  Pager (M-F, 8A-5P):  In-house pager [phone]; Long-range pager [phone]  Other Times:  Please page Hospitalist in Charge -  In-house pager [phone]    Patient is a 89y old  Female who presents with a chief complaint of agitation (16 Oct 2022 17:27)      SUBJECTIVE / OVERNIGHT EVENTS:  ADDITIONAL REVIEW OF SYSTEMS:    MEDICATIONS  (STANDING):  aMIOdarone    Tablet 200 milliGRAM(s) Oral daily  amLODIPine   Tablet 2.5 milliGRAM(s) Oral daily  artificial tears (preservative free) Ophthalmic Solution 1 Drop(s) Both EYES three times a day  atorvastatin 20 milliGRAM(s) Oral at bedtime  diVALproex Sprinkle 125 milliGRAM(s) Oral <User Schedule>  enoxaparin Injectable 30 milliGRAM(s) SubCutaneous every 24 hours  levothyroxine 125 MICROGram(s) Oral daily  lisinopril 40 milliGRAM(s) Oral daily  multivitamin 1 Tablet(s) Oral daily  polyethylene glycol 3350 17 Gram(s) Oral two times a day  propranolol 40 milliGRAM(s) Oral two times a day  psyllium Powder 1 Packet(s) Oral two times a day  senna 2 Tablet(s) Oral at bedtime    MEDICATIONS  (PRN):  OLANZapine Injectable 1.25 milliGRAM(s) IntraMuscular every 8 hours PRN Agitation      CAPILLARY BLOOD GLUCOSE        I&O's Summary      PHYSICAL EXAM:  Vital Signs Last 24 Hrs  T(C): 36.1 (17 Oct 2022 12:07), Max: 36.9 (16 Oct 2022 14:05)  T(F): 97 (17 Oct 2022 12:07), Max: 98.4 (16 Oct 2022 14:05)  HR: 64 (17 Oct 2022 12:07) (59 - 65)  BP: 152/93 (17 Oct 2022 12:07) (149/62 - 155/81)  BP(mean): 95 (17 Oct 2022 05:39) (91 - 95)  RR: 16 (17 Oct 2022 12:07) (16 - 18)  SpO2: 96% (17 Oct 2022 12:07) (95% - 96%)    Parameters below as of 17 Oct 2022 12:07  Patient On (Oxygen Delivery Method): room air    CONSTITUTIONAL: thin elderly woman NAD  RESPIRATORY: Normal respiratory effort; lungs are clear to auscultation bilaterally  CARDIOVASCULAR: Regular rate and rhythm, normal S1 and S2, no murmur/rub/gallop; No lower extremity edema; Peripheral pulses are 2+ bilaterally  ABDOMEN: Nontender to palpation, normoactive bowel sounds, no rebound/guarding  MUSCULOSKELETAL: no clubbing or cyanosis of digits; no joint swelling or tenderness to palpation  PSYCH: calm, oriented self  NEUROLOGY: nonfocal  SKIN: No rashes; no palpable lesions    LABS:    RADIOLOGY & ADDITIONAL TESTS:  Results Reviewed:   Imaging Personally Reviewed:  Electrocardiogram Personally Reviewed:    COORDINATION OF CARE:  Care Discussed with Consultants/Other Providers [Y/N]: RN, SW, CM, ACP re overall care   Prior or Outpatient Records Reviewed [Y/N]:   [facility] Division of Hospital Medicine  Zohra Gale MD  Pager (M-F, 8A-5P):  In-house pager [phone]; Long-range pager [phone]  Other Times:  Please page Hospitalist in Charge -  In-house pager [phone]    Patient is a 89y old  Female who presents with a chief complaint of agitation (16 Oct 2022 17:27)    SUBJECTIVE / OVERNIGHT EVENTS:  No problems reported over night.   ADDITIONAL REVIEW OF SYSTEMS:    MEDICATIONS  (STANDING):  aMIOdarone    Tablet 200 milliGRAM(s) Oral daily  amLODIPine   Tablet 2.5 milliGRAM(s) Oral daily  artificial tears (preservative free) Ophthalmic Solution 1 Drop(s) Both EYES three times a day  atorvastatin 20 milliGRAM(s) Oral at bedtime  diVALproex Sprinkle 125 milliGRAM(s) Oral <User Schedule>  enoxaparin Injectable 30 milliGRAM(s) SubCutaneous every 24 hours  levothyroxine 125 MICROGram(s) Oral daily  lisinopril 40 milliGRAM(s) Oral daily  multivitamin 1 Tablet(s) Oral daily  polyethylene glycol 3350 17 Gram(s) Oral two times a day  propranolol 40 milliGRAM(s) Oral two times a day  psyllium Powder 1 Packet(s) Oral two times a day  senna 2 Tablet(s) Oral at bedtime    MEDICATIONS  (PRN):  OLANZapine Injectable 1.25 milliGRAM(s) IntraMuscular every 8 hours PRN Agitation    PHYSICAL EXAM:  Vital Signs Last 24 Hrs  T(C): 36.1 (17 Oct 2022 12:07), Max: 36.9 (16 Oct 2022 14:05)  T(F): 97 (17 Oct 2022 12:07), Max: 98.4 (16 Oct 2022 14:05)  HR: 64 (17 Oct 2022 12:07) (59 - 65)  BP: 152/93 (17 Oct 2022 12:07) (149/62 - 155/81)  BP(mean): 95 (17 Oct 2022 05:39) (91 - 95)  RR: 16 (17 Oct 2022 12:07) (16 - 18)  SpO2: 96% (17 Oct 2022 12:07) (95% - 96%)    Parameters below as of 17 Oct 2022 12:07  Patient On (Oxygen Delivery Method): room air    CONSTITUTIONAL: thin elderly woman NAD  RESPIRATORY: Normal respiratory effort; lungs are clear to auscultation bilaterally  CARDIOVASCULAR: Regular rate and rhythm, normal S1 and S2, no murmur/rub/gallop; No lower extremity edema; Peripheral pulses are 2+ bilaterally  ABDOMEN: Nontender to palpation, normoactive bowel sounds, no rebound/guarding  MUSCULOSKELETAL: no clubbing or cyanosis of digits; no joint swelling or tenderness to palpation  PSYCH: calm, oriented self  NEUROLOGY: nonfocal  SKIN: No rashes; no palpable lesions    LABS:    RADIOLOGY & ADDITIONAL TESTS:  Results Reviewed:   Imaging Personally Reviewed:  Electrocardiogram Personally Reviewed:    COORDINATION OF CARE:  Care Discussed with Consultants/Other Providers [Y/N]: RN, SW, CM, ACP re overall care   Prior or Outpatient Records Reviewed [Y/N]:

## 2022-10-18 NOTE — PROGRESS NOTE ADULT - NSPROGADDITIONALINFOA_GEN_ALL_CORE
d/w daughter Vani re overall care d/w daughter Vani re overall care  await [facility]...

## 2022-10-18 NOTE — PROGRESS NOTE ADULT - PROBLEM SELECTOR PLAN 2
10/9 COVID exposure, +on 10/14 remains asymptomatic  monitoring for symptoms, consider remdesivir if develops sx

## 2022-10-18 NOTE — PROGRESS NOTE ADULT - SUBJECTIVE AND OBJECTIVE BOX
[facility] Division of Hospital Medicine  Zohra Gale MD  Pager (M-F, 8A-5P):  In-house pager [phone]; Long-range pager [phone]  Other Times:  Please page Hospitalist in Charge -  In-house pager [phone]    Patient is a 89y old  Female who presents with a chief complaint of agitation (17 Oct 2022 14:02)    SUBJECTIVE / OVERNIGHT EVENTS:  No problems reported over night.   ADDITIONAL REVIEW OF SYSTEMS:    MEDICATIONS  (STANDING):  aMIOdarone    Tablet 200 milliGRAM(s) Oral daily  amLODIPine   Tablet 2.5 milliGRAM(s) Oral daily  artificial tears (preservative free) Ophthalmic Solution 1 Drop(s) Both EYES three times a day  atorvastatin 20 milliGRAM(s) Oral at bedtime  diVALproex Sprinkle 125 milliGRAM(s) Oral <User Schedule>  enoxaparin Injectable 30 milliGRAM(s) SubCutaneous every 24 hours  levothyroxine 125 MICROGram(s) Oral daily  lisinopril 40 milliGRAM(s) Oral daily  multivitamin 1 Tablet(s) Oral daily  polyethylene glycol 3350 17 Gram(s) Oral two times a day  propranolol 40 milliGRAM(s) Oral two times a day  psyllium Powder 1 Packet(s) Oral two times a day  senna 2 Tablet(s) Oral at bedtime    MEDICATIONS  (PRN):  OLANZapine Injectable 1.25 milliGRAM(s) IntraMuscular every 8 hours PRN Agitation    PHYSICAL EXAM:  Vital Signs Last 24 Hrs  T(C): 36.3 (18 Oct 2022 05:40), Max: 36.3 (18 Oct 2022 05:40)  T(F): 97.4 (18 Oct 2022 05:40), Max: 97.4 (18 Oct 2022 05:40)  HR: 66 (18 Oct 2022 05:40) (59 - 66)  BP: 158/82 (18 Oct 2022 05:40) (148/79 - 158/82)  BP(mean): 107 (18 Oct 2022 05:40) (107 - 107)  RR: 18 (18 Oct 2022 05:40) (16 - 18)  SpO2: 97% (18 Oct 2022 05:40) (96% - 97%)    Parameters below as of 18 Oct 2022 05:40  Patient On (Oxygen Delivery Method): room air    CONSTITUTIONAL: thin elderly woman NAD  RESPIRATORY: Normal respiratory effort; lungs are clear to auscultation bilaterally  CARDIOVASCULAR: Regular rate and rhythm, normal S1 and S2, no murmur/rub/gallop; No lower extremity edema; Peripheral pulses are 2+ bilaterally  ABDOMEN: Nontender to palpation, normoactive bowel sounds, no rebound/guarding  MUSCULOSKELETAL: no clubbing or cyanosis of digits; no joint swelling or tenderness to palpation  PSYCH: calm, oriented self  NEUROLOGY: nonfocal  SKIN: No rashes; no palpable lesions    LABS:                        12.7   6.51  )-----------( 228      ( 17 Oct 2022 18:43 )             38.0     10-17    141  |  103  |  29<H>  ----------------------------<  143<H>  3.8   |  24  |  0.77    Ca    8.9      17 Oct 2022 18:43  Phos  3.3     10-17  Mg     2.10     10-17    RADIOLOGY & ADDITIONAL TESTS:  Results Reviewed:   Imaging Personally Reviewed:  Electrocardiogram Personally Reviewed:    COORDINATION OF CARE:  Care Discussed with Consultants/Other Providers [Y/N]: RN, SW, CM, ACP re overall care   Prior or Outpatient Records Reviewed [Y/N]:   [facility] Division of Hospital Medicine  Zohra Gale MD  Pager (M-F, 8A-5P):  In-house pager [phone]; Long-range pager [phone]  Other Times:  Please page Hospitalist in Charge -  In-house pager [phone]    Patient is a 89y old  Female who presents with a chief complaint of agitation (17 Oct 2022 14:02)    SUBJECTIVE / OVERNIGHT EVENTS:  No problems reported over night. Pt awake and alert, helped reposition her, more comfortable. Some pain when extending knees. Pleasantly confused.  ADDITIONAL REVIEW OF SYSTEMS:    MEDICATIONS  (STANDING):  aMIOdarone    Tablet 200 milliGRAM(s) Oral daily  amLODIPine   Tablet 2.5 milliGRAM(s) Oral daily  artificial tears (preservative free) Ophthalmic Solution 1 Drop(s) Both EYES three times a day  atorvastatin 20 milliGRAM(s) Oral at bedtime  diVALproex Sprinkle 125 milliGRAM(s) Oral <User Schedule>  enoxaparin Injectable 30 milliGRAM(s) SubCutaneous every 24 hours  levothyroxine 125 MICROGram(s) Oral daily  lisinopril 40 milliGRAM(s) Oral daily  multivitamin 1 Tablet(s) Oral daily  polyethylene glycol 3350 17 Gram(s) Oral two times a day  propranolol 40 milliGRAM(s) Oral two times a day  psyllium Powder 1 Packet(s) Oral two times a day  senna 2 Tablet(s) Oral at bedtime    MEDICATIONS  (PRN):  OLANZapine Injectable 1.25 milliGRAM(s) IntraMuscular every 8 hours PRN Agitation    PHYSICAL EXAM:  Vital Signs Last 24 Hrs  T(C): 36.3 (18 Oct 2022 05:40), Max: 36.3 (18 Oct 2022 05:40)  T(F): 97.4 (18 Oct 2022 05:40), Max: 97.4 (18 Oct 2022 05:40)  HR: 66 (18 Oct 2022 05:40) (59 - 66)  BP: 158/82 (18 Oct 2022 05:40) (148/79 - 158/82)  BP(mean): 107 (18 Oct 2022 05:40) (107 - 107)  RR: 18 (18 Oct 2022 05:40) (16 - 18)  SpO2: 97% (18 Oct 2022 05:40) (96% - 97%)    Parameters below as of 18 Oct 2022 05:40  Patient On (Oxygen Delivery Method): room air    CONSTITUTIONAL: thin elderly woman NAD  RESPIRATORY: Normal respiratory effort; lungs are clear to auscultation bilaterally  CARDIOVASCULAR: Regular rate and rhythm, normal S1 and S2, no murmur/rub/gallop; No lower extremity edema; Peripheral pulses are 2+ bilaterally  ABDOMEN: Nontender to palpation, normoactive bowel sounds, no rebound/guarding  MUSCULOSKELETAL: no clubbing or cyanosis of digits; no joint swelling or tenderness to palpation  PSYCH: calm, oriented self  NEUROLOGY: nonfocal  SKIN: No rashes; no palpable lesions    LABS:                        12.7   6.51  )-----------( 228      ( 17 Oct 2022 18:43 )             38.0     10-17    141  |  103  |  29<H>  ----------------------------<  143<H>  3.8   |  24  |  0.77    Ca    8.9      17 Oct 2022 18:43  Phos  3.3     10-17  Mg     2.10     10-17    RADIOLOGY & ADDITIONAL TESTS:  Results Reviewed:   Imaging Personally Reviewed:  Electrocardiogram Personally Reviewed:    COORDINATION OF CARE:  Care Discussed with Consultants/Other Providers [Y/N]: RN, SW, CM, ACP re overall care   Prior or Outpatient Records Reviewed [Y/N]:

## 2022-10-19 NOTE — PROGRESS NOTE ADULT - REASON FOR ADMISSION
agitation

## 2022-10-19 NOTE — PROGRESS NOTE ADULT - PROBLEM SELECTOR PROBLEM 6
Need for prophylactic measure
Need for prophylactic measure
HTN (hypertension)
Need for prophylactic measure
HTN (hypertension)
HTN (hypertension)
Need for prophylactic measure
Need for prophylactic measure
HTN (hypertension)
Need for prophylactic measure
HTN (hypertension)
Need for prophylactic measure
HTN (hypertension)
HTN (hypertension)
Need for prophylactic measure
Need for prophylactic measure
HTN (hypertension)
HTN (hypertension)
Need for prophylactic measure
Encounter for palliative care
Need for prophylactic measure
HTN (hypertension)

## 2022-10-19 NOTE — PROGRESS NOTE ADULT - SUBJECTIVE AND OBJECTIVE BOX
[facility] Division of Hospital Medicine  Zohra Gale MD  Pager (M-F, 8A-5P):  In-house pager [phone]; Long-range pager [phone]  Other Times:  Please page Hospitalist in Charge -  In-house pager [phone]    Patient is a 89y old  Female who presents with a chief complaint of agitation (18 Oct 2022 08:45)    SUBJECTIVE / OVERNIGHT EVENTS:  No problems reported over night.   ADDITIONAL REVIEW OF SYSTEMS:    MEDICATIONS  (STANDING):  aMIOdarone    Tablet 200 milliGRAM(s) Oral daily  amLODIPine   Tablet 2.5 milliGRAM(s) Oral daily  artificial tears (preservative free) Ophthalmic Solution 1 Drop(s) Both EYES three times a day  atorvastatin 20 milliGRAM(s) Oral at bedtime  diVALproex Sprinkle 125 milliGRAM(s) Oral <User Schedule>  enoxaparin Injectable 30 milliGRAM(s) SubCutaneous every 24 hours  levothyroxine 125 MICROGram(s) Oral daily  lisinopril 40 milliGRAM(s) Oral daily  multivitamin 1 Tablet(s) Oral daily  polyethylene glycol 3350 17 Gram(s) Oral two times a day  propranolol 40 milliGRAM(s) Oral two times a day  psyllium Powder 1 Packet(s) Oral two times a day  senna 2 Tablet(s) Oral at bedtime    MEDICATIONS  (PRN):  OLANZapine Injectable 1.25 milliGRAM(s) IntraMuscular every 8 hours PRN Agitation    PHYSICAL EXAM:  Vital Signs Last 24 Hrs  T(C): 36.9 (19 Oct 2022 05:35), Max: 36.9 (19 Oct 2022 05:35)  T(F): 98.4 (19 Oct 2022 05:35), Max: 98.4 (19 Oct 2022 05:35)  HR: 57 (19 Oct 2022 05:35) (57 - 70)  BP: 151/74 (19 Oct 2022 05:35) (120/70 - 158/80)  BP(mean): 114 (18 Oct 2022 20:36) (114 - 114)  RR: 17 (19 Oct 2022 05:35) (17 - 18)  SpO2: 95% (19 Oct 2022 05:35) (95% - 99%)    Parameters below as of 18 Oct 2022 20:36  Patient On (Oxygen Delivery Method): room air    CONSTITUTIONAL: thin elderly woman NAD  RESPIRATORY: Normal respiratory effort; lungs are clear to auscultation bilaterally  CARDIOVASCULAR: Regular rate and rhythm, normal S1 and S2, no murmur/rub/gallop; No lower extremity edema; Peripheral pulses are 2+ bilaterally  ABDOMEN: Nontender to palpation, normoactive bowel sounds, no rebound/guarding  MUSCULOSKELETAL: no clubbing or cyanosis of digits; no joint swelling or tenderness to palpation  PSYCH: calm, oriented self  NEUROLOGY: nonfocal  SKIN: No rashes; no palpable lesions    LABS:                        11.8   5.74  )-----------( 230      ( 18 Oct 2022 07:15 )             35.7     10-18    141  |  104  |  25<H>  ----------------------------<  99  3.7   |  26  |  0.83    Ca    8.8      18 Oct 2022 07:15  Phos  3.3     10-18  Mg     2.30     10-18    RADIOLOGY & ADDITIONAL TESTS:  Results Reviewed:   Imaging Personally Reviewed:  Electrocardiogram Personally Reviewed:    COORDINATION OF CARE:  Care Discussed with Consultants/Other Providers [Y/N]: RN, SW, CM, ACP re overall care   Prior or Outpatient Records Reviewed [Y/N]:   [facility] Division of Hospital Medicine  Zohra Gale MD  Pager (M-F, 8A-5P):  In-house pager [phone]; Long-range pager [phone]  Other Times:  Please page Hospitalist in Charge -  In-house pager [phone]    Patient is a 89y old  Female who presents with a chief complaint of agitation (18 Oct 2022 08:45)    SUBJECTIVE / OVERNIGHT EVENTS:  No problems reported over night. Pt seen during am care. She is calling for her aide "Jayce" who is visiting her.  ADDITIONAL REVIEW OF SYSTEMS:    MEDICATIONS  (STANDING):  aMIOdarone    Tablet 200 milliGRAM(s) Oral daily  amLODIPine   Tablet 2.5 milliGRAM(s) Oral daily  artificial tears (preservative free) Ophthalmic Solution 1 Drop(s) Both EYES three times a day  atorvastatin 20 milliGRAM(s) Oral at bedtime  diVALproex Sprinkle 125 milliGRAM(s) Oral <User Schedule>  enoxaparin Injectable 30 milliGRAM(s) SubCutaneous every 24 hours  levothyroxine 125 MICROGram(s) Oral daily  lisinopril 40 milliGRAM(s) Oral daily  multivitamin 1 Tablet(s) Oral daily  polyethylene glycol 3350 17 Gram(s) Oral two times a day  propranolol 40 milliGRAM(s) Oral two times a day  psyllium Powder 1 Packet(s) Oral two times a day  senna 2 Tablet(s) Oral at bedtime    MEDICATIONS  (PRN):  OLANZapine Injectable 1.25 milliGRAM(s) IntraMuscular every 8 hours PRN Agitation    PHYSICAL EXAM:  Vital Signs Last 24 Hrs  T(C): 36.9 (19 Oct 2022 05:35), Max: 36.9 (19 Oct 2022 05:35)  T(F): 98.4 (19 Oct 2022 05:35), Max: 98.4 (19 Oct 2022 05:35)  HR: 57 (19 Oct 2022 05:35) (57 - 70)  BP: 151/74 (19 Oct 2022 05:35) (120/70 - 158/80)  BP(mean): 114 (18 Oct 2022 20:36) (114 - 114)  RR: 17 (19 Oct 2022 05:35) (17 - 18)  SpO2: 95% (19 Oct 2022 05:35) (95% - 99%)    Parameters below as of 18 Oct 2022 20:36  Patient On (Oxygen Delivery Method): room air    CONSTITUTIONAL: thin elderly woman NAD  RESPIRATORY: Normal respiratory effort; lungs are clear to auscultation bilaterally  CARDIOVASCULAR: Regular rate and rhythm, normal S1 and S2, no murmur/rub/gallop; No lower extremity edema; Peripheral pulses are 2+ bilaterally  ABDOMEN: Nontender to palpation, normoactive bowel sounds, no rebound/guarding  MUSCULOSKELETAL: no clubbing or cyanosis of digits; no joint swelling or tenderness to palpation  PSYCH: calm, oriented self  NEUROLOGY: nonfocal  SKIN: No rashes; no palpable lesions    LABS:                        11.8   5.74  )-----------( 230      ( 18 Oct 2022 07:15 )             35.7     10-18    141  |  104  |  25<H>  ----------------------------<  99  3.7   |  26  |  0.83    Ca    8.8      18 Oct 2022 07:15  Phos  3.3     10-18  Mg     2.30     10-18    RADIOLOGY & ADDITIONAL TESTS:  Results Reviewed:   Imaging Personally Reviewed:  Electrocardiogram Personally Reviewed:    COORDINATION OF CARE:  Care Discussed with Consultants/Other Providers [Y/N]: RN, SW, CM, ACP re overall care   Prior or Outpatient Records Reviewed [Y/N]:

## 2022-10-19 NOTE — PROGRESS NOTE ADULT - NSPROGADDITIONALINFOA_GEN_ALL_CORE
d/w daughter Vani re overall care  await [facility]... Spent 35 minutes counseling and coordinating discharge care.

## 2022-10-19 NOTE — DISCHARGE NOTE NURSING/CASE MANAGEMENT/SOCIAL WORK - PATIENT PORTAL LINK FT
You can access the FollowMyHealth Patient Portal offered by [facility] by registering at the following website: [email or web address]. By joining [employer]’s FollowMyHealth portal, you will also be able to view your health information using other applications (apps) compatible with our system.

## 2022-10-19 NOTE — PROGRESS NOTE ADULT - PROBLEM SELECTOR PROBLEM 1
Agitation due to dementia
Debility

## 2022-10-19 NOTE — PROGRESS NOTE ADULT - ASSESSMENT
89 year old female with PMH of TIA, afib not on a/c, HTN, hypothyroidism, GERD, multiple falls with vertebral fractures, moderate to severe dementia presents with worsening agitation likely 2/2 progressive dementia, infectious workup negative. Likely dementia related agitation. 
89F TIA, afib not on a/c, HTN, hypothyroidism, GERD, multiple falls with vertebral fractures, moderate to severe dementia presents with worsening agitation likely 2/2 progressive dementia, infectious workup negative. Likely dementia related agitation. 
89F TIA, afib not on a/c, HTN, hypothyroidism, GERD, multiple falls with vertebral fractures, moderate to severe dementia presents with worsening agitation likely 2/2 progressive dementia, infectious workup negative. Likely dementia related agitation. 
89 year old female with PMH of TIA, afib not on a/c, HTN, hypothyroidism, GERD, multiple falls with vertebral fractures, moderate to severe dementia presents with worsening agitation likely 2/2 progressive dementia, infectious workup negative. Likely dementia related agitation. 
89 year old female with PMH of TIA, afib not on a/c, HTN, hypothyroidism, GERD, multiple falls with vertebral fractures, moderate to severe dementia presents with worsening agitation likely 2/2 progressive dementia, infectious workup negative. Likely dementia related agitation. 
89F TIA, afib not on a/c, HTN, hypothyroidism, GERD, multiple falls with vertebral fractures, moderate to severe dementia presents with worsening agitation likely 2/2 progressive dementia, infectious workup negative. Likely dementia related agitation. 
89 year old female with PMH of TIA, HTN, hypothyroidism, GERD, multiple falls with vertebral fractures, moderate to severe dementia presents to the ED accompanied by daughter who states pt had a virtual visit with her PMD today for worsening dementia and increased agitation at home.   Palliative Care consulted for complex decision making  related to goals of care discussions 
89 year old female with PMH of TIA, afib not on a/c, HTN, hypothyroidism, GERD, multiple falls with vertebral fractures, moderate to severe dementia presents with worsening agitation likely 2/2 progressive dementia, infectious workup negative. Likely dementia related agitation. 
89 year old female with PMH of TIA, afib not on a/c, HTN, hypothyroidism, GERD, multiple falls with vertebral fractures, moderate to severe dementia presents with worsening agitation likely 2/2 progressive dementia, infectious workup negative. Likely dementia related agitation. 
89F TIA, afib not on a/c, HTN, hypothyroidism, GERD, multiple falls with vertebral fractures, moderate to severe dementia presents with worsening agitation likely 2/2 progressive dementia, infectious workup negative. Likely dementia related agitation. 
89 year old female with PMH of TIA, afib not on a/c, HTN, hypothyroidism, GERD, multiple falls with vertebral fractures, moderate to severe dementia presents with worsening agitation likely 2/2 progressive dementia, infectious workup negative. Likely dementia related agitation. 
89F TIA, afib not on a/c, HTN, hypothyroidism, GERD, multiple falls with vertebral fractures, moderate to severe dementia presents with worsening agitation likely 2/2 progressive dementia, infectious workup negative. Likely dementia related agitation. 
89F TIA, afib not on a/c, HTN, hypothyroidism, GERD, multiple falls with vertebral fractures, moderate to severe dementia presents with worsening agitation likely 2/2 progressive dementia, infectious workup negative. Likely dementia related agitation. 
89 year old female with PMH of TIA, afib not on a/c, HTN, hypothyroidism, GERD, multiple falls with vertebral fractures, moderate to severe dementia presents with worsening agitation likely 2/2 progressive dementia, infectious workup negative. Likely dementia related agitation. 
89F TIA, afib not on a/c, HTN, hypothyroidism, GERD, multiple falls with vertebral fractures, moderate to severe dementia presents with worsening agitation likely 2/2 progressive dementia, infectious workup negative. Likely dementia related agitation. 
89F TIA, afib not on a/c, HTN, hypothyroidism, GERD, multiple falls with vertebral fractures, moderate to severe dementia presents with worsening agitation likely 2/2 progressive dementia, infectious workup negative. Likely dementia related agitation. 
89 year old female with PMH of TIA, afib not on a/c, HTN, hypothyroidism, GERD, multiple falls with vertebral fractures, moderate to severe dementia presents with worsening agitation likely 2/2 progressive dementia, infectious workup negative. Likely dementia related agitation. 
89 year old female with PMH of TIA, afib not on a/c, HTN, hypothyroidism, GERD, multiple falls with vertebral fractures, moderate to severe dementia presents with worsening agitation likely 2/2 progressive dementia, infectious workup negative. Likely dementia related agitation. 
89F TIA, afib not on a/c, HTN, hypothyroidism, GERD, multiple falls with vertebral fractures, moderate to severe dementia presents with worsening agitation likely 2/2 progressive dementia, infectious workup negative. Likely dementia related agitation.

## 2022-10-19 NOTE — PROGRESS NOTE ADULT - PROVIDER SPECIALTY LIST ADULT
Hey, I couldn't find the tigan suppositories on epic. So I sent in the 300mg capsules tid.    Please call her pharamcy and see if they have the supp. I would rather use those.  If so, just switch the dose with verbal .  thanks  
Hospitalist
Internal Medicine
Hospitalist
Hospitalist
Internal Medicine
Hospitalist
Internal Medicine
Hospitalist
Internal Medicine
Hospitalist
Hospitalist
Internal Medicine
Internal Medicine
Hospitalist
Internal Medicine
Hospitalist
Palliative Care

## 2022-10-19 NOTE — DISCHARGE NOTE NURSING/CASE MANAGEMENT/SOCIAL WORK - NSDCPEFALRISK_GEN_ALL_CORE
For information on Fall & Injury Prevention, visit: [email or web address] OR  [email or web address] OR  https://www.cdc.gov/steadi/patient.html

## 2022-10-19 NOTE — PROGRESS NOTE ADULT - PROBLEM SELECTOR PLAN 7
DVT ppx: Lovenox  Diet: regular diet  Dispo: family hoping for [facility], coordinating with SW

## 2022-11-04 ENCOUNTER — APPOINTMENT (OUTPATIENT)
Dept: GERIATRICS | Facility: CLINIC | Age: 87
End: 2022-11-04

## 2022-11-09 ENCOUNTER — APPOINTMENT (OUTPATIENT)
Dept: GERIATRICS | Facility: CLINIC | Age: 87
End: 2022-11-09

## 2023-02-23 NOTE — DIETITIAN INITIAL EVALUATION ADULT. - PROBLEM SELECTOR PROBLEM 6
Anesthesia Pre Eval Note    Anesthesia ROS/Med Hx    Overall Review:  EKG was reviewed       Pulmonary Review:    Positive for COPD     Neuro/Psych Review:    Positive for headaches  Positive for psychiatric history - Depression    Cardiovascular Review:    Positive for hypertension    GI/HEPATIC/RENAL Review:    Positive for GERD  Positive for hepatitis- type C        Relevant Problems   No relevant active problems       Physical Exam     Airway   Mallampati: II  TM Distance: >3 FB    Cardiovascular  Cardiovascular exam normal    Head Assessment  Head assessment: Normocephalic and Atraumatic    General Assessment  General Assessment: Alert and oriented and No acute distress    Dental Exam  Dental exam normal    Pulmonary Exam  Pulmonary exam normal    Abdominal Exam  Abdominal exam normal      Anesthesia Plan:    ASA Status: 3  Anesthesia Type: MAC    Induction: Intravenous  Preferred Airway Type: Mask  Maintenance: TIVA  Checklist  Reviewed: Lab Results, Allergies, Medications, Consultations, EKG, Patient Summary, DNR Status, Problem list, Past Med History, NPO Status and Beta Blocker Status  Consent/Risks Discussed Statement:  The proposed anesthetic plan, including its risks and benefits, have been discussed with the Patient along with the risks and benefits of alternatives. Questions were encouraged and answered and the patient and/or representative understands and agrees to proceed.        I discussed with the patient (and/or patient's legal representative) the risks and benefits of the proposed anesthesia plan, MAC, which may include services performed by other anesthesia providers.    Alternative anesthesia plans, if available, were reviewed with the patient (and/or patient's legal representative). Discussion has been held with the patient (and/or patient's legal representative) regarding risks of anesthesia, which include allergic reaction, aspiration, conversion to general anesthesia, dental injury,  intra-operative awareness, hypotension, emergence delirium, nausea, need for blood transfusion, nerve injury, oral injury, vomiting, eye injury and organ damage and emergent situations that may require change in anesthesia plan.    The patient (and/or patient's legal representative) has indicated understanding, his/her questions have been answered, and he/she wishes to proceed with the planned anesthetic.    Blood Products: Not Anticipated     Need for prophylactic measure

## 2023-04-13 NOTE — PROGRESS NOTE ADULT - PROBLEM SELECTOR PLAN 6
Patient is in the supine position.   The body was positioned using the following devices: gel pad mattress.  The patient was positioned by Radha Kennedy RN, Vito Gonzales RN
- DNR/DNI , copy of MOLST in chart   - patient and family working on arranging hospice

## 2023-11-06 NOTE — BH CONSULTATION LIAISON ASSESSMENT NOTE - DIFFERENTIAL
dementia  delirium 
Veronica: I performed a face to face bedside interview with patient regarding history of present illness, review of symptoms and past medical history. I completed an independent physical exam and ordered tests/medications as needed.  I have discussed patient's plan of care with advanced care provider. The advanced care provider assisted in  executing the discussed plan. I was available for any questions or issues that may have arose during the execution of the plan of care.

## 2023-12-16 NOTE — HISTORY OF PRESENT ILLNESS
[FreeTextEntry1] : 89 years old female with medical history of basal cell carcinoma  of the  right  nasal ala,  atrial fibrillation,  anxiety,  GERD, cataract, hyperlipidemia, insomnia,  IBS,  TIA, hypothyroidism,  osteoporosis who came to clinic for pain  management s/p  mechanical  fall.  Patient  had  a  mechanical  fall  last Thursday  on the upper  back. \par \par Patient complains of dull right  sided pain, 6-8/10 intensity, non radiating, worsened by movement ,  constipation and  touch . Responds partially  to topical  treatment  with  Aspergel, heat patch and  Tylenol. No  hematuria  or  urinary  changes. Denied any  chest  pain, shortness  of  breath,  palpitations  lightheadeness. \par Patient   has  been  having  prior  episodes  of  constipation . As per  caregivers patient was having  regular  bowel  movements  until   last  week. Patient is currently taking  Colace and senna without  response. Patient also has a Gastroenterologist.  She was  evaluated  a  few  weeks ago and  was told  her  bowel movement  pattern  is  suggestive  of  overflow  incontinence. Patient  was advised to start Miralax and  increase fiber  in her  diet. Patient  states  she  is  having  mild  abdominal pain, loss of appetite, nausea  and  increase gas (burping).  Denied  any emesis. \par Regarding  prior  pain  management  treatments  patient  was  on  opioid treatment around  4  years ago. She  reported  severe  constipation  and  increase  episodes of  falls. Tylenol helps very  little  and  topical  treatment  shows  modest improvement. \par \par Walks  with  a  walker\par \par Patient  consented  to   rectal  examination.  pt sleeping during episode. asymptomatic VS stable /87  pt on 12.5 toprol QD No

## 2024-04-10 NOTE — ED PROVIDER NOTE - NSICDXFAMILYHX_GEN_ALL_CORE_FT
04/10/24 0901   Prechemo Checklist   Has the patient been in the hospital, ED, or urgent care since last date of service No   Chemo/Immuno Consent Signed Yes   Protocol/Indications Verified Yes   Confirmed to previous date/time of medication Yes   Compared to previous dose Yes   All medications are dated accurately Yes   Pregnancy Test Negative Not applicable   Parameters Met Yes   BSA/Weight-Height Verified Yes   Dose Calculations Verified (current, total, cumulative) Yes       
FAMILY HISTORY:  No pertinent family history in first degree relatives

## 2024-11-20 NOTE — ASSESSMENT
----- Message from Vero sent at 11/20/2024  2:01 PM CST -----  Bp reading 87/54 hr 68. [phone]   [FreeTextEntry1] : Daughter Vani had to leave appointment early but states that she would like to discuss a few things r/t patient's dementia. Advised to schedule f/u telephonic visit with me in 1 week.\par \par Shanae Johnston, FNP-BC

## 2025-04-28 NOTE — PHYSICAL THERAPY INITIAL EVALUATION ADULT - LEVEL OF INDEPENDENCE: SCOOT/BRIDGE, REHAB EVAL
FAMILY HISTORY:  FH: hypertension    Mother  Still living? Unknown  Family history of cardiac disorder, Age at diagnosis: Age Unknown    
moderate assist (50% patients effort)

## 2025-05-19 NOTE — PROGRESS NOTE ADULT - PROBLEM SELECTOR PROBLEM 6
Malnutrition Findings:      BMI Findings:     Body mass index is 13.16 kg/m².     Will consult nutrition during admission.  Continue encourage adequate oral hydration and nutrition.  
Prophylactic measure
Cystitis

## 2025-05-23 NOTE — CONSULT NOTE ADULT - ASSESSMENT
Patient attended Phase 2 Pulmonary Rehab Exercise Session. Documentation can be found in the Media Tab.   89 year old female with PMH of TIA, HTN, hypothyroidism, GERD, multiple falls with vertebral fractures, moderate to severe dementia presents to the ED accompanied by daughter who states pt had a virtual visit with her PMD today for worsening dementia and increased agitation at home.   Palliative Care consulted for complex decision making  related to goals of care discussions